# Patient Record
Sex: FEMALE | Race: WHITE | NOT HISPANIC OR LATINO | Employment: UNEMPLOYED | URBAN - METROPOLITAN AREA
[De-identification: names, ages, dates, MRNs, and addresses within clinical notes are randomized per-mention and may not be internally consistent; named-entity substitution may affect disease eponyms.]

---

## 2017-01-05 ENCOUNTER — OFFICE VISIT (OUTPATIENT)
Dept: FAMILY MEDICINE | Facility: CLINIC | Age: 52
End: 2017-01-05
Payer: COMMERCIAL

## 2017-01-05 VITALS
HEART RATE: 88 BPM | DIASTOLIC BLOOD PRESSURE: 72 MMHG | SYSTOLIC BLOOD PRESSURE: 118 MMHG | HEIGHT: 65 IN | BODY MASS INDEX: 36.33 KG/M2 | WEIGHT: 218.06 LBS

## 2017-01-05 DIAGNOSIS — D64.9 ANEMIA, UNSPECIFIED TYPE: ICD-10-CM

## 2017-01-05 DIAGNOSIS — I10 ESSENTIAL HYPERTENSION: ICD-10-CM

## 2017-01-05 DIAGNOSIS — E11.9 TYPE 2 DIABETES MELLITUS WITHOUT COMPLICATION, WITHOUT LONG-TERM CURRENT USE OF INSULIN: ICD-10-CM

## 2017-01-05 DIAGNOSIS — E03.9 HYPOTHYROIDISM, UNSPECIFIED TYPE: ICD-10-CM

## 2017-01-05 DIAGNOSIS — N39.0 URINARY TRACT INFECTION WITHOUT HEMATURIA, SITE UNSPECIFIED: ICD-10-CM

## 2017-01-05 DIAGNOSIS — R30.0 DYSURIA: Primary | ICD-10-CM

## 2017-01-05 PROCEDURE — 4010F ACE/ARB THERAPY RXD/TAKEN: CPT | Mod: S$GLB,,, | Performed by: NURSE PRACTITIONER

## 2017-01-05 PROCEDURE — 2022F DILAT RTA XM EVC RTNOPTHY: CPT | Mod: S$GLB,,, | Performed by: NURSE PRACTITIONER

## 2017-01-05 PROCEDURE — 99999 PR PBB SHADOW E&M-EST. PATIENT-LVL III: CPT | Mod: PBBFAC,,, | Performed by: NURSE PRACTITIONER

## 2017-01-05 PROCEDURE — 1159F MED LIST DOCD IN RCRD: CPT | Mod: S$GLB,,, | Performed by: NURSE PRACTITIONER

## 2017-01-05 PROCEDURE — 87147 CULTURE TYPE IMMUNOLOGIC: CPT

## 2017-01-05 PROCEDURE — 3078F DIAST BP <80 MM HG: CPT | Mod: S$GLB,,, | Performed by: NURSE PRACTITIONER

## 2017-01-05 PROCEDURE — 81001 URINALYSIS AUTO W/SCOPE: CPT | Mod: S$GLB,,, | Performed by: NURSE PRACTITIONER

## 2017-01-05 PROCEDURE — 3046F HEMOGLOBIN A1C LEVEL >9.0%: CPT | Mod: S$GLB,,, | Performed by: NURSE PRACTITIONER

## 2017-01-05 PROCEDURE — 87086 URINE CULTURE/COLONY COUNT: CPT

## 2017-01-05 PROCEDURE — 87088 URINE BACTERIA CULTURE: CPT

## 2017-01-05 PROCEDURE — 3074F SYST BP LT 130 MM HG: CPT | Mod: S$GLB,,, | Performed by: NURSE PRACTITIONER

## 2017-01-05 PROCEDURE — 99214 OFFICE O/P EST MOD 30 MIN: CPT | Mod: 25,S$GLB,, | Performed by: NURSE PRACTITIONER

## 2017-01-05 RX ORDER — PHENAZOPYRIDINE HYDROCHLORIDE 200 MG/1
200 TABLET, FILM COATED ORAL 3 TIMES DAILY PRN
Qty: 10 TABLET | Refills: 0 | Status: SHIPPED | OUTPATIENT
Start: 2017-01-05 | End: 2017-12-27

## 2017-01-05 RX ORDER — LEVOTHYROXINE SODIUM 75 UG/1
75 TABLET ORAL DAILY
Qty: 30 TABLET | Refills: 5 | Status: SHIPPED | OUTPATIENT
Start: 2017-01-05 | End: 2017-12-27 | Stop reason: SDUPTHER

## 2017-01-05 RX ORDER — TELMISARTAN 20 MG/1
20 TABLET ORAL DAILY
Qty: 30 TABLET | Refills: 5 | Status: SHIPPED | OUTPATIENT
Start: 2017-01-05 | End: 2017-12-27 | Stop reason: SDUPTHER

## 2017-01-05 RX ORDER — SULFAMETHOXAZOLE AND TRIMETHOPRIM 800; 160 MG/1; MG/1
1 TABLET ORAL 2 TIMES DAILY
Qty: 20 TABLET | Refills: 0 | Status: SHIPPED | OUTPATIENT
Start: 2017-01-05 | End: 2017-01-15

## 2017-01-05 RX ORDER — GLIMEPIRIDE 4 MG/1
4 TABLET ORAL
Qty: 30 TABLET | Refills: 5 | Status: SHIPPED | OUTPATIENT
Start: 2017-01-05 | End: 2017-12-27 | Stop reason: SDUPTHER

## 2017-01-05 RX ORDER — FERROUS SULFATE 325(65) MG
325 TABLET ORAL 2 TIMES DAILY
Qty: 60 TABLET | Refills: 5 | Status: SHIPPED | OUTPATIENT
Start: 2017-01-05 | End: 2017-12-27 | Stop reason: SDUPTHER

## 2017-01-05 NOTE — PROGRESS NOTES
Subjective:       Patient ID: Ladonna Hunter is a 51 y.o. female.    Chief Complaint: Follow-up    HPI Comments: Here for 6 month follow up. Doing well but burning on urination for 2 days.    Review of Systems   Constitutional: Negative.  Negative for appetite change, fatigue and fever.   HENT: Negative.  Negative for congestion, ear pain and sore throat.    Eyes: Negative.  Negative for visual disturbance.   Respiratory: Negative.  Negative for cough, shortness of breath and wheezing.    Cardiovascular: Negative.    Gastrointestinal: Negative.  Negative for abdominal pain, diarrhea, nausea and vomiting.   Endocrine: Negative.    Genitourinary: Positive for dysuria (for 2 days). Negative for difficulty urinating and urgency.   Musculoskeletal: Negative.  Negative for arthralgias and myalgias.   Skin: Negative.  Negative for color change.   Allergic/Immunologic: Negative.    Neurological: Negative.  Negative for dizziness and headaches.   Hematological: Negative.    Psychiatric/Behavioral: Negative.  Negative for sleep disturbance.   All other systems reviewed and are negative.      Objective:      Physical Exam   Constitutional: She is oriented to person, place, and time. She appears well-developed and well-nourished. No distress.   HENT:   Head: Normocephalic and atraumatic.   Eyes: Pupils are equal, round, and reactive to light.   Neck: Normal range of motion. Neck supple.   Cardiovascular: Normal rate and regular rhythm.    No murmur heard.  Pulmonary/Chest: Effort normal and breath sounds normal. No respiratory distress.   Musculoskeletal: Normal range of motion.   Neurological: She is alert and oriented to person, place, and time.   Skin: Skin is warm and dry.   Psychiatric: She has a normal mood and affect.   Nursing note and vitals reviewed.      Assessment:       1. Dysuria    2. Type 2 diabetes mellitus without complication, without long-term current use of insulin    3. Essential hypertension    4.  Hypothyroidism, unspecified type    5. Anemia, unspecified type    6. Urinary tract infection without hematuria, site unspecified        Plan:   Ladonna was seen today for follow-up.    Diagnoses and all orders for this visit:    Dysuria  -     POCT urinalysis, dipstick or tablet reag  -     POCT URINE SEDIMENT EXAM    Type 2 diabetes mellitus without complication, without long-term current use of insulin  -     telmisartan (MICARDIS) 20 MG Tab; Take 1 tablet (20 mg total) by mouth once daily.  -     glimepiride (AMARYL) 4 MG tablet; Take 1 tablet (4 mg total) by mouth before breakfast.    Essential hypertension  -     telmisartan (MICARDIS) 20 MG Tab; Take 1 tablet (20 mg total) by mouth once daily.    Hypothyroidism, unspecified type  -     levothyroxine (SYNTHROID) 75 MCG tablet; Take 1 tablet (75 mcg total) by mouth once daily.    Anemia, unspecified type  -     ferrous sulfate 325 mg (65 mg iron) Tab tablet; Take 1 tablet (325 mg total) by mouth 2 (two) times daily.    Urinary tract infection without hematuria, site unspecified  -     sulfamethoxazole-trimethoprim 800-160mg (BACTRIM DS) 800-160 mg Tab; Take 1 tablet by mouth 2 (two) times daily.  -     phenazopyridine (PYRIDIUM) 200 MG tablet; Take 1 tablet (200 mg total) by mouth 3 (three) times daily as needed for Pain (dysuria).  -     Urine culture    Blood work fasting Monday    RTC 6 months for recheck

## 2017-01-05 NOTE — MR AVS SNAPSHOT
[place] [address] [address] [facility] [address]  Phone: [phone]  Fax: [phone]                  Ladonna Hunter   2017 4:15 PM   Office Visit    Description:  Female : 1965   Provider:  Mayra Islas NP   Department:  [facility]           Reason for Visit     Follow-up           Diagnoses this Visit        Comments    Dysuria    -  Primary     Type 2 diabetes mellitus without complication, without long-term current use of insulin         Essential hypertension         Hypothyroidism, unspecified type         Anemia, unspecified type         Urinary tract infection without hematuria, site unspecified                To Do List           Future Appointments        Provider Department Dept Phone    2017 10:25 AM LAB, [facility] [phone]    2017 1:15 PM Mayra Islas NP [facility] [phone]      Goals (5 Years of Data)     None      Follow-Up and Disposition     Return in about 6 months (around 2017).       These Medications        Disp Refills Start End    telmisartan (MICARDIS) 20 MG Tab 30 tablet 5 2017    Take 1 tablet (20 mg total) by mouth once daily. - Oral    Pharmacy: [place] [id number] Ph #: [phone]       ferrous sulfate 325 mg (65 mg iron) Tab tablet 60 tablet 5 2017     Take 1 tablet (325 mg total) by mouth 2 (two) times daily. - Oral    Pharmacy: [place] [address] Ph #: [phone]       levothyroxine (SYNTHROID) 75 MCG tablet 30 tablet 5 2017    Take 1 tablet (75 mcg total) by mouth once daily. - Oral    Pharmacy: [place] [address] Ph #: [phone]       glimepiride (AMARYL) 4 MG tablet 30 tablet 5 2017    Take 1 tablet (4 mg total) by mouth before breakfast. - Oral    Pharmacy: [address]  [facility] [place] [address] Ph #: [phone]       sulfamethoxazole-trimethoprim 800-160mg (BACTRIM DS) 800-160 mg Tab 20 tablet 0 1/5/2017 1/15/2017    Take 1 tablet by mouth 2 (two) times daily. - Oral    Pharmacy: [facility] [address] [place] [address] Ph #: [phone]       phenazopyridine (PYRIDIUM) 200 MG tablet 10 tablet 0 1/5/2017     Take 1 tablet (200 mg total) by mouth 3 (three) times daily as needed for Pain (dysuria). - Oral    Pharmacy: [place] [address] Ph #: [phone]         [facility]     [facility] On Call Nurse [facility] Line - 24/7 Assistance  Registered nurses in the [facility] On Call Center provide clinical advisement, health education, appointment booking, and other advisory services.  Call for this free service at [phone].             Medications           Message regarding Medications     Verify the changes and/or additions to your medication regime listed below are the same as discussed with your clinician today.  If any of these changes or additions are incorrect, please notify your healthcare provider.        START taking these NEW medications        Refills    sulfamethoxazole-trimethoprim 800-160mg (BACTRIM DS) 800-160 mg Tab 0    Sig: Take 1 tablet by mouth 2 (two) times daily.    Class: Normal    Route: Oral    phenazopyridine (PYRIDIUM) 200 MG tablet 0    Sig: Take 1 tablet (200 mg total) by mouth 3 (three) times daily as needed for Pain (dysuria).    Class: Normal    Route: Oral      STOP taking these medications     benzphetamine 50 mg Tab Take 1 tablet by mouth 3 (three) times daily.           Verify that the below list of medications is an accurate representation of the medications you are currently taking.  If none reported, the list may be blank. If incorrect, please contact your healthcare provider. Carry this list with you in case of emergency.           Current Medications     blood sugar diagnostic Strp 1 strip by  "Misc.(Non-Drug; Combo Route) route 3 (three) times daily. Use with True Result Glucometer    ferrous sulfate 325 mg (65 mg iron) Tab tablet Take 1 tablet (325 mg total) by mouth 2 (two) times daily.    glimepiride (AMARYL) 4 MG tablet Take 1 tablet (4 mg total) by mouth before breakfast.    lancets Misc 1 Units by Misc.(Non-Drug; Combo Route) route 4 (four) times daily as needed.    levothyroxine (SYNTHROID) 75 MCG tablet Take 1 tablet (75 mcg total) by mouth once daily.    metformin (GLUCOPHAGE) 500 MG tablet Take 2 tablets (1,000 mg total) by mouth 2 (two) times daily with meals.    telmisartan (MICARDIS) 20 MG Tab Take 1 tablet (20 mg total) by mouth once daily.    blood-glucose meter kit Use as instructed    fluconazole (DIFLUCAN) 150 MG Tab 1 today and repeat in 1 week if necessary    phenazopyridine (PYRIDIUM) 200 MG tablet Take 1 tablet (200 mg total) by mouth 3 (three) times daily as needed for Pain (dysuria).    sulfamethoxazole-trimethoprim 800-160mg (BACTRIM DS) 800-160 mg Tab Take 1 tablet by mouth 2 (two) times daily.           Clinical Reference Information           Vital Signs - Last Recorded  Most recent update: 1/5/2017  4:27 PM by Tejas Davila MA    BP Pulse Ht Wt BMI    118/72 (BP Location: Left arm, Patient Position: Sitting, BP Method: Manual) 88 5' 5" (1.651 m) 98.9 kg (218 lb 0.6 oz) 36.28 kg/m2      Blood Pressure          Most Recent Value    BP  118/72      Allergies as of 1/5/2017     No Known Allergies      Immunizations Administered on Date of Encounter - 1/5/2017     None      Orders Placed During Today's Visit      Normal Orders This Visit    POCT urinalysis, dipstick or tablet reag     POCT URINE SEDIMENT EXAM     Urine culture       "

## 2017-01-06 LAB
BACTERIA SPEC CULT: NORMAL
BILIRUB SERPL-MCNC: NORMAL MG/DL
BLOOD URINE, POC: NORMAL
CASTS: NORMAL
COLOR, POC UA: NORMAL
CRYSTALS: NORMAL
GLUCOSE UR QL STRIP: 1000
KETONES UR QL STRIP: NORMAL
LEUKOCYTE ESTERASE URINE, POC: NORMAL
NITRITE, POC UA: NORMAL
PH, POC UA: 5
PROTEIN, POC: 30
RBC CELLS COUNTED: NORMAL
SPECIFIC GRAVITY, POC UA: 1.02
UROBILINOGEN, POC UA: NORMAL
WHITE BLOOD CELLS: NORMAL

## 2017-01-07 LAB — BACTERIA UR CULT: NORMAL

## 2017-01-09 ENCOUNTER — CLINICAL SUPPORT (OUTPATIENT)
Dept: FAMILY MEDICINE | Facility: CLINIC | Age: 52
End: 2017-01-09
Payer: COMMERCIAL

## 2017-01-09 DIAGNOSIS — D64.9 ANEMIA: ICD-10-CM

## 2017-01-09 DIAGNOSIS — E03.9 HYPOTHYROIDISM: ICD-10-CM

## 2017-01-09 LAB
ANISOCYTOSIS BLD QL SMEAR: SLIGHT
BASOPHILS # BLD AUTO: 0.03 K/UL
BASOPHILS NFR BLD: 0.4 %
DIFFERENTIAL METHOD: ABNORMAL
EOSINOPHIL # BLD AUTO: 0.2 K/UL
EOSINOPHIL NFR BLD: 2 %
ERYTHROCYTE [DISTWIDTH] IN BLOOD BY AUTOMATED COUNT: 19 %
GIANT PLATELETS BLD QL SMEAR: PRESENT
HCT VFR BLD AUTO: 34.7 %
HGB BLD-MCNC: 10.2 G/DL
LYMPHOCYTES # BLD AUTO: 2.4 K/UL
LYMPHOCYTES NFR BLD: 32 %
MCH RBC QN AUTO: 17.9 PG
MCHC RBC AUTO-ENTMCNC: 29.4 %
MCV RBC AUTO: 61 FL
MONOCYTES # BLD AUTO: 0.5 K/UL
MONOCYTES NFR BLD: 7.3 %
NEUTROPHILS # BLD AUTO: 4.3 K/UL
NEUTROPHILS NFR BLD: 58.4 %
PLATELET # BLD AUTO: 301 K/UL
PMV BLD AUTO: ABNORMAL FL
POIKILOCYTOSIS BLD QL SMEAR: SLIGHT
POLYCHROMASIA BLD QL SMEAR: ABNORMAL
RBC # BLD AUTO: 5.7 M/UL
SCHISTOCYTES BLD QL SMEAR: PRESENT
T4 FREE SERPL-MCNC: 0.94 NG/DL
TSH SERPL DL<=0.005 MIU/L-ACNC: 4.96 UIU/ML
WBC # BLD AUTO: 7.43 K/UL

## 2017-01-09 PROCEDURE — 36415 COLL VENOUS BLD VENIPUNCTURE: CPT | Mod: S$GLB,,, | Performed by: NURSE PRACTITIONER

## 2017-01-09 PROCEDURE — 84439 ASSAY OF FREE THYROXINE: CPT

## 2017-01-09 PROCEDURE — 84443 ASSAY THYROID STIM HORMONE: CPT

## 2017-01-09 PROCEDURE — 85025 COMPLETE CBC W/AUTO DIFF WBC: CPT

## 2017-01-09 PROCEDURE — 83036 HEMOGLOBIN GLYCOSYLATED A1C: CPT

## 2017-01-11 LAB
ESTIMATED AVG GLUCOSE: 326 MG/DL
HBA1C MFR BLD HPLC: 13 %

## 2017-01-12 ENCOUNTER — TELEPHONE (OUTPATIENT)
Dept: FAMILY MEDICINE | Facility: CLINIC | Age: 52
End: 2017-01-12

## 2017-01-12 NOTE — PROGRESS NOTES
Abnormal lab - A1c still too high. i would like to send her to endocrine. Does she want to see Dr. Lloyd, [facility] or [facility]?

## 2017-01-12 NOTE — TELEPHONE ENCOUNTER
----- Message from Mayra Islas NP sent at 1/12/2017  8:21 AM CST -----  Abnormal lab - A1c still too high. i would like to send her to endocrine. Does she want to see Dr. Lloyd, [facility] or [facility] [place]?

## 2017-01-13 NOTE — TELEPHONE ENCOUNTER
----- Message from Doreen Petersen sent at 2017 10:40 AM CST -----  Contact: SELF  Ladonna Hunter  MRN: [id number]  : 1965  PCP: Mayra Islas  Home Phone      [phone]  Work Phone      [phone]  Mobile          [phone]      MESSAGE:    Returning call to clinic.    Phone:  [phone]

## 2017-05-29 DIAGNOSIS — E11.9 TYPE 2 DIABETES MELLITUS WITHOUT COMPLICATION: ICD-10-CM

## 2017-06-05 LAB — HUMAN PAPILLOMAVIRUS (HPV): NORMAL

## 2017-06-20 ENCOUNTER — PATIENT OUTREACH (OUTPATIENT)
Dept: ADMINISTRATIVE | Facility: HOSPITAL | Age: 52
End: 2017-06-20

## 2017-06-20 NOTE — LETTER
June 30, 2017    Ladonna Hunter  [address] [place] [address]             [facility]  [address]  [place] [address]  Phone: [phone] Dear Mrs. Hunter:    We have tried to reach you by mychart unsuccessfully.    [facility] is committed to your overall health.  To help you get the most out of each of your visits, we will review your information to make sure you are up to date on all of your recommended tests and/or procedures.       Mayra Islas NP has found that you may be due for a tetanus vaccine, a pneumonia vaccine, an annual diabetic foot exan, an annual diabetic eye exam, a Pap smear with HPV Cotest, a mammogram, a colonoscopy, a fasting cholesterol check and a Hemoglobin A1C diabetic blood test.     If you have had any of the above done at another facility, please bring the records or information with you so that your record at [facility] will be complete.  If you would like to schedule any of these, please contact me.     If you are currently taking medication, please bring it with you to your appointment for review.       Sincerely,       Carissa Jeong LPN Clinical Care Coordinator   [facility]   [phone]

## 2017-07-07 DIAGNOSIS — E11.9 TYPE 2 DIABETES MELLITUS WITHOUT COMPLICATION: ICD-10-CM

## 2017-07-28 DIAGNOSIS — Z12.31 OTHER SCREENING MAMMOGRAM: ICD-10-CM

## 2017-09-18 DIAGNOSIS — E11.9 DM (DIABETES MELLITUS): ICD-10-CM

## 2017-09-18 DIAGNOSIS — E11.9 TYPE 2 DIABETES MELLITUS WITHOUT COMPLICATION: ICD-10-CM

## 2017-09-18 RX ORDER — LANCETS
1 EACH MISCELLANEOUS 4 TIMES DAILY PRN
Qty: 1 EACH | Refills: 5 | Status: SHIPPED | OUTPATIENT
Start: 2017-09-18 | End: 2017-09-22 | Stop reason: SDUPTHER

## 2017-09-21 NOTE — TELEPHONE ENCOUNTER
Called pt to let her know that Rx was sent ,However pt states that she needs a new machine she has none now. Please call pt.

## 2017-09-22 RX ORDER — LANCETS
1 EACH MISCELLANEOUS 4 TIMES DAILY PRN
Qty: 1 EACH | Refills: 5 | Status: SHIPPED | OUTPATIENT
Start: 2017-09-22 | End: 2017-09-26 | Stop reason: SDUPTHER

## 2017-09-22 NOTE — TELEPHONE ENCOUNTER
----- Message from Doreen Petersen MA sent at 2017  2:01 PM CDT -----  Contact: guerita Hunter  MRN: [id number]  : 1965  PCP: Mayra Denny Foret  Home Phone      [phone]  Work Phone      [phone]  Mobile          [phone]      MESSAGE:   Needs refill on lancets,test strips and machine.  Stated Mrs Denny was looking into getting her a new machine, due to the one she has the no longer make.  Phone:  [phone]  Pharmacy:  [facility]

## 2017-09-26 ENCOUNTER — TELEPHONE (OUTPATIENT)
Dept: FAMILY MEDICINE | Facility: CLINIC | Age: 52
End: 2017-09-26

## 2017-09-26 NOTE — TELEPHONE ENCOUNTER
Notified by pts insurance that she has to go through their diabetic program and use [facility] in order for them to pay for the diabetic supply

## 2017-09-26 NOTE — TELEPHONE ENCOUNTER
Attempted to initiate PA on pts test strips, and was notified by pts insurance that she has to register for the Diabetic Program offered by her insurance. The number to register is [phone]. Once registered all rxs for diabetic supply must go to [facility] in [facility] ph: [phone]; fax [phone]      She must register for them to pay.      No answer. LM

## 2017-09-27 RX ORDER — LANCETS
1 EACH MISCELLANEOUS 4 TIMES DAILY PRN
Qty: 1 EACH | Refills: 5 | Status: SHIPPED | OUTPATIENT
Start: 2017-09-27 | End: 2019-06-12

## 2017-12-26 ENCOUNTER — TELEPHONE (OUTPATIENT)
Dept: FAMILY MEDICINE | Facility: CLINIC | Age: 52
End: 2017-12-26

## 2017-12-26 NOTE — TELEPHONE ENCOUNTER
----- Message from Mario Valadez sent at 2017  9:11 AM CST -----  Contact: SELF   Ladonna Hunter  MRN: [id number]  : 1965  PCP: Mayra Islas  Home Phone      [phone]  Work Phone      [phone]  Mobile          [phone]      MESSAGE: STATED SHE IS HAVING SYMPTOMS OF BELLS PALSY AND WANTS TO BE SEEN BY ANYONE TODAY. SHE IS SCHEDULED TO SEE MS. BRIAN TOMORROW BUT IS VERY CONCERNED. PLEASE CALL     PHONE: [phone]

## 2017-12-27 ENCOUNTER — OFFICE VISIT (OUTPATIENT)
Dept: FAMILY MEDICINE | Facility: CLINIC | Age: 52
End: 2017-12-27
Payer: COMMERCIAL

## 2017-12-27 VITALS
RESPIRATION RATE: 18 BRPM | WEIGHT: 221.56 LBS | HEIGHT: 65 IN | OXYGEN SATURATION: 96 % | TEMPERATURE: 99 F | DIASTOLIC BLOOD PRESSURE: 72 MMHG | BODY MASS INDEX: 36.91 KG/M2 | SYSTOLIC BLOOD PRESSURE: 142 MMHG | HEART RATE: 71 BPM

## 2017-12-27 DIAGNOSIS — G51.0 BELL'S PALSY: ICD-10-CM

## 2017-12-27 DIAGNOSIS — E11.9 TYPE 2 DIABETES MELLITUS WITHOUT COMPLICATION, WITHOUT LONG-TERM CURRENT USE OF INSULIN: ICD-10-CM

## 2017-12-27 DIAGNOSIS — E03.9 HYPOTHYROIDISM, UNSPECIFIED TYPE: ICD-10-CM

## 2017-12-27 DIAGNOSIS — D64.9 ANEMIA, UNSPECIFIED TYPE: ICD-10-CM

## 2017-12-27 DIAGNOSIS — I10 ESSENTIAL HYPERTENSION: ICD-10-CM

## 2017-12-27 DIAGNOSIS — T25.232A PARTIAL THICKNESS BURN OF TOE OF LEFT FOOT, INITIAL ENCOUNTER: Primary | ICD-10-CM

## 2017-12-27 PROCEDURE — 99214 OFFICE O/P EST MOD 30 MIN: CPT | Mod: 25,S$GLB,, | Performed by: NURSE PRACTITIONER

## 2017-12-27 PROCEDURE — 99999 PR PBB SHADOW E&M-EST. PATIENT-LVL III: CPT | Mod: PBBFAC,,, | Performed by: NURSE PRACTITIONER

## 2017-12-27 RX ORDER — GLIMEPIRIDE 4 MG/1
4 TABLET ORAL
Qty: 30 TABLET | Refills: 5 | Status: SHIPPED | OUTPATIENT
Start: 2017-12-27 | End: 2018-01-03 | Stop reason: SDUPTHER

## 2017-12-27 RX ORDER — VALACYCLOVIR HYDROCHLORIDE 1 G/1
1000 TABLET, FILM COATED ORAL 2 TIMES DAILY
Qty: 14 TABLET | Refills: 0 | Status: SHIPPED | OUTPATIENT
Start: 2017-12-27 | End: 2019-06-12

## 2017-12-27 RX ORDER — METFORMIN HYDROCHLORIDE 500 MG/1
1000 TABLET ORAL 2 TIMES DAILY WITH MEALS
Qty: 120 TABLET | Refills: 5 | Status: SHIPPED | OUTPATIENT
Start: 2017-12-27 | End: 2018-09-05 | Stop reason: SDUPTHER

## 2017-12-27 RX ORDER — SILVER SULFADIAZINE 10 G/1000G
CREAM TOPICAL 2 TIMES DAILY
Qty: 20 G | Refills: 0 | Status: SHIPPED | OUTPATIENT
Start: 2017-12-27 | End: 2018-01-18 | Stop reason: SDUPTHER

## 2017-12-27 RX ORDER — TELMISARTAN 20 MG/1
20 TABLET ORAL DAILY
Qty: 30 TABLET | Refills: 5 | Status: SHIPPED | OUTPATIENT
Start: 2017-12-27 | End: 2018-01-03 | Stop reason: SDUPTHER

## 2017-12-27 RX ORDER — FERROUS SULFATE 325(65) MG
325 TABLET ORAL 2 TIMES DAILY
Qty: 60 TABLET | Refills: 5 | Status: SHIPPED | OUTPATIENT
Start: 2017-12-27 | End: 2019-06-12

## 2017-12-27 RX ORDER — LEVOTHYROXINE SODIUM 75 UG/1
75 TABLET ORAL DAILY
Qty: 30 TABLET | Refills: 5 | Status: SHIPPED | OUTPATIENT
Start: 2017-12-27 | End: 2018-01-03 | Stop reason: SDUPTHER

## 2017-12-27 RX ORDER — GLUCOSAM/CHON-MSM1/C/MANG/BOSW 500-416.6
TABLET ORAL
Refills: 1 | COMMUNITY
Start: 2017-09-28 | End: 2019-06-12

## 2017-12-27 NOTE — PROGRESS NOTES
Subjective:       Patient ID: Ladonna Hunter is a 52 y.o. female.    Chief Complaint: Follow-up (check up) and Toe Injury (blisters on right foot)    Here for check up of DM and HTN. Started with Bell's palsy 3 days ago. Has had it before.      Toe Injury   This is a new problem. Episode onset: 2 weeks ago. Pertinent negatives include no abdominal pain, arthralgias, congestion, coughing, fatigue, fever, headaches, myalgias, nausea, sore throat or vomiting. Associated symptoms comments: Burn to the left 4-5 toes. Exacerbated by: DM.     Review of Systems   Constitutional: Negative.  Negative for appetite change, fatigue and fever.   HENT: Negative.  Negative for congestion, ear pain and sore throat.    Eyes: Negative.  Negative for visual disturbance.   Respiratory: Negative.  Negative for cough, shortness of breath and wheezing.    Cardiovascular: Negative.    Gastrointestinal: Negative.  Negative for abdominal pain, diarrhea, nausea and vomiting.   Endocrine: Negative.    Genitourinary: Negative.  Negative for difficulty urinating and urgency.   Musculoskeletal: Negative.  Negative for arthralgias and myalgias.   Skin: Positive for wound (burn to the 4-5th toes of the left foot - both at the pads). Negative for color change.   Allergic/Immunologic: Negative.    Neurological: Positive for facial asymmetry (paralysis on the right side). Negative for dizziness and headaches.   Hematological: Negative.    Psychiatric/Behavioral: Negative.  Negative for sleep disturbance.   All other systems reviewed and are negative.      Objective:      Physical Exam   Constitutional: She is oriented to person, place, and time. She appears well-developed and well-nourished.   HENT:   Head: Normocephalic and atraumatic.   Eyes: Conjunctivae and EOM are normal. Pupils are equal, round, and reactive to light.   Neck: Normal range of motion. Neck supple. No thyromegaly present.   Cardiovascular: Normal rate, regular rhythm and normal  heart sounds.    No murmur heard.  Pulmonary/Chest: Effort normal and breath sounds normal. No respiratory distress.   Abdominal: Soft.   Musculoskeletal: Normal range of motion.   Lymphadenopathy:     She has no cervical adenopathy.   Neurological: She is alert and oriented to person, place, and time. She has normal reflexes.   Facial droop on the right   Skin: Skin is warm and dry. No rash noted.   Burns at the 4-5th toes at the pad of each. Debridement done. There is some thick skin at the fifth toe.   Psychiatric: She has a normal mood and affect.   Nursing note and vitals reviewed.      Assessment:       1. Partial thickness burn of toe of left foot, initial encounter    2. Bell's palsy    3. Anemia, unspecified type    4. Type 2 diabetes mellitus without complication, without long-term current use of insulin    5. Essential hypertension    6. Hypothyroidism, unspecified type        Plan:   Ladonna was seen today for follow-up and toe injury.    Diagnoses and all orders for this visit:    Partial thickness burn of toe of left foot, initial encounter  -     silver sulfADIAZINE 1% (SILVADENE) 1 % cream; Apply topically 2 (two) times daily.    Bell's palsy  -     valACYclovir (VALTREX) 1000 MG tablet; Take 1 tablet (1,000 mg total) by mouth 2 (two) times daily.    Anemia, unspecified type  -     ferrous sulfate 325 mg (65 mg iron) Tab tablet; Take 1 tablet (325 mg total) by mouth 2 (two) times daily.  -     CBC auto differential; Future    Type 2 diabetes mellitus without complication, without long-term current use of insulin  -     telmisartan (MICARDIS) 20 MG Tab; Take 1 tablet (20 mg total) by mouth once daily.  -     metFORMIN (GLUCOPHAGE) 500 MG tablet; Take 2 tablets (1,000 mg total) by mouth 2 (two) times daily with meals.  -     glimepiride (AMARYL) 4 MG tablet; Take 1 tablet (4 mg total) by mouth before breakfast.  -     CBC auto differential; Future  -     Comprehensive metabolic panel; Future  -      Lipid panel; Future  -     POCT urinalysis, dipstick or tablet reag; Future  -     Hemoglobin A1c; Future    Essential hypertension  -     telmisartan (MICARDIS) 20 MG Tab; Take 1 tablet (20 mg total) by mouth once daily.  -     Comprehensive metabolic panel; Future  -     Lipid panel; Future  -     Microalbumin/creatinine urine ratio; Future  -     POCT urinalysis, dipstick or tablet reag; Future    Hypothyroidism, unspecified type  -     levothyroxine (SYNTHROID) 75 MCG tablet; Take 1 tablet (75 mcg total) by mouth once daily.  -     TSH; Future  -     T4, free; Future  -     T3, free; Future    RTC 1 week for recheck of toe and Bell Palsy

## 2017-12-29 ENCOUNTER — LAB VISIT (OUTPATIENT)
Dept: LAB | Facility: HOSPITAL | Age: 52
End: 2017-12-29
Attending: NURSE PRACTITIONER
Payer: COMMERCIAL

## 2017-12-29 DIAGNOSIS — E03.9 HYPOTHYROIDISM, UNSPECIFIED TYPE: ICD-10-CM

## 2017-12-29 DIAGNOSIS — I10 ESSENTIAL HYPERTENSION: ICD-10-CM

## 2017-12-29 DIAGNOSIS — E11.9 TYPE 2 DIABETES MELLITUS WITHOUT COMPLICATION, WITHOUT LONG-TERM CURRENT USE OF INSULIN: ICD-10-CM

## 2017-12-29 DIAGNOSIS — D64.9 ANEMIA, UNSPECIFIED TYPE: ICD-10-CM

## 2017-12-29 LAB
ALBUMIN SERPL BCP-MCNC: 3 G/DL
ALP SERPL-CCNC: 85 U/L
ALT SERPL W/O P-5'-P-CCNC: 9 U/L
ANION GAP SERPL CALC-SCNC: 9 MMOL/L
ANISOCYTOSIS BLD QL SMEAR: SLIGHT
AST SERPL-CCNC: 10 U/L
BASOPHILS # BLD AUTO: 0.02 K/UL
BASOPHILS NFR BLD: 0.3 %
BILIRUB SERPL-MCNC: 0.3 MG/DL
BUN SERPL-MCNC: 14 MG/DL
CALCIUM SERPL-MCNC: 9.4 MG/DL
CHLORIDE SERPL-SCNC: 103 MMOL/L
CHOLEST SERPL-MCNC: 210 MG/DL
CHOLEST/HDLC SERPL: 4 {RATIO}
CO2 SERPL-SCNC: 25 MMOL/L
CREAT SERPL-MCNC: 0.9 MG/DL
CREAT UR-MCNC: 44 MG/DL
DIFFERENTIAL METHOD: ABNORMAL
EOSINOPHIL # BLD AUTO: 0.1 K/UL
EOSINOPHIL NFR BLD: 2 %
ERYTHROCYTE [DISTWIDTH] IN BLOOD BY AUTOMATED COUNT: 18.7 %
EST. GFR  (AFRICAN AMERICAN): >60 ML/MIN/1.73 M^2
EST. GFR  (NON AFRICAN AMERICAN): >60 ML/MIN/1.73 M^2
ESTIMATED AVG GLUCOSE: 292 MG/DL
GLUCOSE SERPL-MCNC: 424 MG/DL
HBA1C MFR BLD HPLC: 11.8 %
HCT VFR BLD AUTO: 33.8 %
HDLC SERPL-MCNC: 53 MG/DL
HDLC SERPL: 25.2 %
HGB BLD-MCNC: 10.1 G/DL
HYPOCHROMIA BLD QL SMEAR: ABNORMAL
LDLC SERPL CALC-MCNC: 121 MG/DL
LYMPHOCYTES # BLD AUTO: 2.1 K/UL
LYMPHOCYTES NFR BLD: 28.7 %
MCH RBC QN AUTO: 19.5 PG
MCHC RBC AUTO-ENTMCNC: 29.9 G/DL
MCV RBC AUTO: 65 FL
MICROALBUMIN UR DL<=1MG/L-MCNC: 458 UG/ML
MICROALBUMIN/CREATININE RATIO: 1040.9 UG/MG
MONOCYTES # BLD AUTO: 0.5 K/UL
MONOCYTES NFR BLD: 7.3 %
NEUTROPHILS # BLD AUTO: 4.4 K/UL
NEUTROPHILS NFR BLD: 61.8 %
NONHDLC SERPL-MCNC: 157 MG/DL
OVALOCYTES BLD QL SMEAR: ABNORMAL
PLATELET # BLD AUTO: 266 K/UL
PLATELET BLD QL SMEAR: ABNORMAL
PMV BLD AUTO: 9.6 FL
POIKILOCYTOSIS BLD QL SMEAR: SLIGHT
POLYCHROMASIA BLD QL SMEAR: ABNORMAL
POTASSIUM SERPL-SCNC: 4.1 MMOL/L
PROT SERPL-MCNC: 7.7 G/DL
RBC # BLD AUTO: 5.19 M/UL
SODIUM SERPL-SCNC: 137 MMOL/L
T3FREE SERPL-MCNC: 2.4 PG/ML
T4 FREE SERPL-MCNC: 0.98 NG/DL
TRIGL SERPL-MCNC: 180 MG/DL
TSH SERPL DL<=0.005 MIU/L-ACNC: 6.45 UIU/ML
WBC # BLD AUTO: 7.17 K/UL

## 2017-12-29 PROCEDURE — 83036 HEMOGLOBIN GLYCOSYLATED A1C: CPT

## 2017-12-29 PROCEDURE — 80061 LIPID PANEL: CPT

## 2017-12-29 PROCEDURE — 80053 COMPREHEN METABOLIC PANEL: CPT

## 2017-12-29 PROCEDURE — 85025 COMPLETE CBC W/AUTO DIFF WBC: CPT

## 2017-12-29 PROCEDURE — 84481 FREE ASSAY (FT-3): CPT

## 2017-12-29 PROCEDURE — 84439 ASSAY OF FREE THYROXINE: CPT

## 2017-12-29 PROCEDURE — 36415 COLL VENOUS BLD VENIPUNCTURE: CPT

## 2017-12-29 PROCEDURE — 84443 ASSAY THYROID STIM HORMONE: CPT

## 2017-12-29 PROCEDURE — 82043 UR ALBUMIN QUANTITATIVE: CPT

## 2018-01-03 ENCOUNTER — OFFICE VISIT (OUTPATIENT)
Dept: FAMILY MEDICINE | Facility: CLINIC | Age: 53
End: 2018-01-03
Payer: COMMERCIAL

## 2018-01-03 VITALS
DIASTOLIC BLOOD PRESSURE: 82 MMHG | HEART RATE: 84 BPM | SYSTOLIC BLOOD PRESSURE: 142 MMHG | BODY MASS INDEX: 36.73 KG/M2 | WEIGHT: 220.44 LBS | HEIGHT: 65 IN

## 2018-01-03 DIAGNOSIS — I10 ESSENTIAL HYPERTENSION: ICD-10-CM

## 2018-01-03 DIAGNOSIS — G51.0 BELL'S PALSY: Primary | ICD-10-CM

## 2018-01-03 DIAGNOSIS — R80.9 PROTEINURIA, UNSPECIFIED TYPE: ICD-10-CM

## 2018-01-03 DIAGNOSIS — E03.9 HYPOTHYROIDISM, UNSPECIFIED TYPE: ICD-10-CM

## 2018-01-03 DIAGNOSIS — E11.9 TYPE 2 DIABETES MELLITUS WITHOUT COMPLICATION, WITHOUT LONG-TERM CURRENT USE OF INSULIN: ICD-10-CM

## 2018-01-03 DIAGNOSIS — T25.232D PARTIAL THICKNESS BURN OF TOE OF LEFT FOOT, SUBSEQUENT ENCOUNTER: ICD-10-CM

## 2018-01-03 DIAGNOSIS — E78.1 HYPERTRIGLYCERIDEMIA: ICD-10-CM

## 2018-01-03 PROCEDURE — 99999 PR PBB SHADOW E&M-EST. PATIENT-LVL III: CPT | Mod: PBBFAC,,, | Performed by: NURSE PRACTITIONER

## 2018-01-03 PROCEDURE — 99214 OFFICE O/P EST MOD 30 MIN: CPT | Mod: 25,S$GLB,, | Performed by: NURSE PRACTITIONER

## 2018-01-03 RX ORDER — TELMISARTAN 40 MG/1
40 TABLET ORAL DAILY
Qty: 30 TABLET | Refills: 5 | Status: SHIPPED | OUTPATIENT
Start: 2018-01-03 | End: 2018-09-05 | Stop reason: SDUPTHER

## 2018-01-03 RX ORDER — GLIMEPIRIDE 4 MG/1
4 TABLET ORAL 2 TIMES DAILY
Qty: 60 TABLET | Refills: 5 | Status: SHIPPED | OUTPATIENT
Start: 2018-01-03 | End: 2018-09-05 | Stop reason: SDUPTHER

## 2018-01-03 RX ORDER — FENOFIBRATE 134 MG/1
134 CAPSULE ORAL
Qty: 30 CAPSULE | Refills: 5 | Status: SHIPPED | OUTPATIENT
Start: 2018-01-03 | End: 2018-09-05 | Stop reason: SDUPTHER

## 2018-01-03 RX ORDER — LEVOTHYROXINE SODIUM 100 UG/1
100 TABLET ORAL DAILY
Qty: 30 TABLET | Refills: 5 | Status: SHIPPED | OUTPATIENT
Start: 2018-01-03 | End: 2018-09-05 | Stop reason: SDUPTHER

## 2018-01-03 NOTE — PROGRESS NOTES
Subjective:       Patient ID: Ladonna Hunter is a 52 y.o. female.    Chief Complaint: [place] Palsy    Here for recheck of Bell's Palsy and diabetes. Increased protein in the urine and A1C 11.8. Bell's Palsy not much better.      Review of Systems   Constitutional: Negative.  Negative for appetite change, fatigue and fever.   HENT: Negative.  Negative for congestion, ear pain and sore throat.    Eyes: Negative.  Negative for visual disturbance.   Respiratory: Negative.  Negative for cough, shortness of breath and wheezing.    Cardiovascular: Negative.    Gastrointestinal: Negative.  Negative for abdominal pain, diarrhea, nausea and vomiting.   Endocrine: Negative.    Genitourinary: Negative.  Negative for difficulty urinating and urgency.   Musculoskeletal: Negative.  Negative for arthralgias and myalgias.   Skin: Negative.  Negative for color change.   Allergic/Immunologic: Negative.    Neurological: Positive for numbness (at feet and right side of face). Negative for dizziness and headaches.        Still with facial droop on the right   Hematological: Negative.    Psychiatric/Behavioral: Negative.  Negative for sleep disturbance.   All other systems reviewed and are negative.      Objective:      Physical Exam   Constitutional: She is oriented to person, place, and time. She appears well-developed and well-nourished. No distress.   HENT:   Head: Normocephalic and atraumatic.   Eyes: Pupils are equal, round, and reactive to light.   Neck: Normal range of motion. Neck supple.   Cardiovascular: Normal rate and regular rhythm.    No murmur heard.  Pulmonary/Chest: Effort normal and breath sounds normal. No respiratory distress.   Musculoskeletal: Normal range of motion.   Neurological: She is alert and oriented to person, place, and time.   Facial droop on the right. Some better   Skin: Skin is warm and dry.   Left toes with ulcers looking better   Psychiatric: She has a normal mood and affect.   Nursing note and  vitals reviewed.      Assessment:       1. Bell's palsy    2. Partial thickness burn of toe of left foot, subsequent encounter    3. Type 2 diabetes mellitus without complication, without long-term current use of insulin    4. Essential hypertension    5. Hypothyroidism, unspecified type    6. Hypertriglyceridemia    7. Proteinuria, unspecified type        Plan:   Ladonna was seen today for bells palsy.    Diagnoses and all orders for this visit:    Bell's palsy    Partial thickness burn of toe of left foot, subsequent encounter    Type 2 diabetes mellitus without complication, without long-term current use of insulin  -     Increase telmisartan (MICARDIS) 40 MG Tab; Take 1 tablet (40 mg total) by mouth once daily.  -     Increase glimepiride (AMARYL) 4 MG tablet; Take 1 tablet (4 mg total) by mouth 2 (two) times daily.  -     fenofibrate micronized (LOFIBRA) 134 MG Cap; Take 1 capsule (134 mg total) by mouth daily with breakfast.    Essential hypertension  -     Increase telmisartan (MICARDIS) 40 MG Tab; Take 1 tablet (40 mg total) by mouth once daily.    Hypothyroidism, unspecified type  -     Increase levothyroxine (SYNTHROID) 100 MCG tablet; Take 1 tablet (100 mcg total) by mouth once daily.    Hypertriglyceridemia  -     fenofibrate micronized (LOFIBRA) 134 MG Cap; Take 1 capsule (134 mg total) by mouth daily with breakfast.    Proteinuria, unspecified type  -     Increase telmisartan (MICARDIS) 40 MG Tab; Take 1 tablet (40 mg total) by mouth once daily.    RTC 2 weeks for recheck

## 2018-01-18 ENCOUNTER — OFFICE VISIT (OUTPATIENT)
Dept: FAMILY MEDICINE | Facility: CLINIC | Age: 53
End: 2018-01-18
Payer: COMMERCIAL

## 2018-01-18 VITALS
WEIGHT: 222.38 LBS | HEIGHT: 65 IN | DIASTOLIC BLOOD PRESSURE: 62 MMHG | SYSTOLIC BLOOD PRESSURE: 118 MMHG | BODY MASS INDEX: 37.05 KG/M2 | HEART RATE: 84 BPM

## 2018-01-18 DIAGNOSIS — E11.9 TYPE 2 DIABETES MELLITUS WITHOUT COMPLICATION, WITHOUT LONG-TERM CURRENT USE OF INSULIN: ICD-10-CM

## 2018-01-18 DIAGNOSIS — G51.0 BELL'S PALSY: Primary | ICD-10-CM

## 2018-01-18 DIAGNOSIS — T25.232D PARTIAL THICKNESS BURN OF TOE OF LEFT FOOT, SUBSEQUENT ENCOUNTER: ICD-10-CM

## 2018-01-18 PROCEDURE — 99999 PR PBB SHADOW E&M-EST. PATIENT-LVL III: CPT | Mod: PBBFAC,,, | Performed by: NURSE PRACTITIONER

## 2018-01-18 PROCEDURE — 99213 OFFICE O/P EST LOW 20 MIN: CPT | Mod: S$GLB,,, | Performed by: NURSE PRACTITIONER

## 2018-01-18 RX ORDER — SILVER SULFADIAZINE 10 G/1000G
CREAM TOPICAL 2 TIMES DAILY
Qty: 20 G | Refills: 0 | Status: SHIPPED | OUTPATIENT
Start: 2018-01-18 | End: 2019-06-12

## 2018-01-18 NOTE — PROGRESS NOTES
Subjective:       Patient ID: Ladonna Hunter is a 52 y.o. female.    Chief Complaint: No chief complaint on file.    Here for recheck of Bell's Palsy. Notes facial drooping is better. Notes crease now in right face so feels its improving.   REcent ^ A1C>11, not well controlled. Notes BS now  running low 200s- In am it seems to always be higher. After lunch runs 180s. No low blood sugars < 100.       Review of Systems   Constitutional: Negative.  Negative for appetite change, fatigue and fever.   HENT: Negative.  Negative for congestion, ear pain and sore throat.    Eyes: Negative.  Negative for visual disturbance.   Respiratory: Negative.  Negative for cough, shortness of breath and wheezing.    Cardiovascular: Negative.    Gastrointestinal: Negative.  Negative for abdominal pain, diarrhea, nausea and vomiting.   Endocrine: Negative.    Genitourinary: Negative.  Negative for difficulty urinating and urgency.   Musculoskeletal: Negative.  Negative for arthralgias and myalgias.   Skin: Negative.  Negative for color change.   Allergic/Immunologic: Negative.    Neurological: Positive for numbness (numbness in feet better  and right facial numbness also improving ). Negative for dizziness and headaches.        Still with facial droop on the right   Hematological: Negative.    Psychiatric/Behavioral: Negative.  Negative for sleep disturbance.   All other systems reviewed and are negative.      Objective:      Physical Exam   Constitutional: She is oriented to person, place, and time. She appears well-developed and well-nourished. No distress.   HENT:   Head: Normocephalic and atraumatic.   Eyes: Pupils are equal, round, and reactive to light.   Neck: Normal range of motion. Neck supple.   Cardiovascular: Normal rate and regular rhythm.    No murmur heard.  Pulmonary/Chest: Effort normal and breath sounds normal. No respiratory distress.   Musculoskeletal: Normal range of motion.   Neurological: She is alert and oriented  to person, place, and time.   Facial droop on the right; much better. Continues to improve.   Numbness in feet r/t ^ BS also better.    Skin: Skin is warm and dry.        Left toes with ulcers looking better   Psychiatric: She has a normal mood and affect.   Nursing note and vitals reviewed.      Assessment:       1. Bell's palsy    2. Type 2 diabetes mellitus without complication, without long-term current use of insulin    3. Partial thickness burn of toe of left foot, subsequent encounter        Plan:     Diagnoses and all orders for this visit:    Bell's palsy - improvmg    Type 2 diabetes mellitus without complication, without long-term current use of insulin - improving    Partial thickness burn of toe of left foot, subsequent encounter  -     silver sulfADIAZINE 1% (SILVADENE) 1 % cream; Apply topically 2 (two) times daily.    RTC 4 weeks for recheck of DM

## 2018-01-19 DIAGNOSIS — Z12.11 COLON CANCER SCREENING: ICD-10-CM

## 2018-02-09 DIAGNOSIS — Z13.5 DIABETIC RETINOPATHY SCREENING: ICD-10-CM

## 2018-04-20 DIAGNOSIS — E11.9 TYPE 2 DIABETES MELLITUS WITHOUT COMPLICATION: ICD-10-CM

## 2018-08-27 ENCOUNTER — APPOINTMENT (OUTPATIENT)
Dept: RADIOLOGY | Facility: CLINIC | Age: 53
End: 2018-08-27
Attending: NURSE PRACTITIONER
Payer: COMMERCIAL

## 2018-08-27 ENCOUNTER — HOSPITAL ENCOUNTER (EMERGENCY)
Facility: HOSPITAL | Age: 53
Discharge: HOME | End: 2018-08-28
Attending: EMERGENCY MEDICINE
Payer: COMMERCIAL

## 2018-08-27 ENCOUNTER — OFFICE VISIT (OUTPATIENT)
Dept: FAMILY MEDICINE | Facility: CLINIC | Age: 53
End: 2018-08-27
Payer: COMMERCIAL

## 2018-08-27 ENCOUNTER — TELEPHONE (OUTPATIENT)
Dept: HEPATOLOGY | Facility: HOSPITAL | Age: 53
End: 2018-08-27

## 2018-08-27 VITALS
HEIGHT: 65 IN | WEIGHT: 215.81 LBS | BODY MASS INDEX: 35.96 KG/M2 | SYSTOLIC BLOOD PRESSURE: 142 MMHG | DIASTOLIC BLOOD PRESSURE: 82 MMHG | HEART RATE: 68 BPM | OXYGEN SATURATION: 98 %

## 2018-08-27 VITALS
RESPIRATION RATE: 16 BRPM | HEART RATE: 77 BPM | BODY MASS INDEX: 35.78 KG/M2 | TEMPERATURE: 98 F | WEIGHT: 215 LBS | SYSTOLIC BLOOD PRESSURE: 151 MMHG | DIASTOLIC BLOOD PRESSURE: 78 MMHG

## 2018-08-27 DIAGNOSIS — R60.0 PEDAL EDEMA: ICD-10-CM

## 2018-08-27 DIAGNOSIS — R73.9 HYPERGLYCEMIA: ICD-10-CM

## 2018-08-27 DIAGNOSIS — R06.02 SOB (SHORTNESS OF BREATH) ON EXERTION: ICD-10-CM

## 2018-08-27 DIAGNOSIS — R80.9 PROTEINURIA, UNSPECIFIED TYPE: ICD-10-CM

## 2018-08-27 DIAGNOSIS — E11.9 TYPE 2 DIABETES MELLITUS WITHOUT COMPLICATION, WITHOUT LONG-TERM CURRENT USE OF INSULIN: Primary | ICD-10-CM

## 2018-08-27 DIAGNOSIS — E03.9 HYPOTHYROIDISM, UNSPECIFIED TYPE: ICD-10-CM

## 2018-08-27 DIAGNOSIS — I10 ESSENTIAL HYPERTENSION: ICD-10-CM

## 2018-08-27 DIAGNOSIS — E11.9 TYPE 2 DIABETES MELLITUS WITHOUT COMPLICATION: ICD-10-CM

## 2018-08-27 DIAGNOSIS — E11.9 TYPE 2 DIABETES MELLITUS WITHOUT COMPLICATION, WITHOUT LONG-TERM CURRENT USE OF INSULIN: ICD-10-CM

## 2018-08-27 DIAGNOSIS — E78.1 HYPERTRIGLYCERIDEMIA: ICD-10-CM

## 2018-08-27 DIAGNOSIS — R25.2 LEG CRAMPS: ICD-10-CM

## 2018-08-27 DIAGNOSIS — N30.00 ACUTE CYSTITIS WITHOUT HEMATURIA: Primary | ICD-10-CM

## 2018-08-27 LAB
ALBUMIN SERPL BCP-MCNC: 2.7 G/DL
ALBUMIN/CREAT UR: 1044.3 UG/MG
ALP SERPL-CCNC: 86 U/L
ALT SERPL W/O P-5'-P-CCNC: 10 U/L
ANION GAP SERPL CALC-SCNC: 13 MMOL/L
AST SERPL-CCNC: 12 U/L
B-HCG UR QL: NEGATIVE
B-OH-BUTYR BLD STRIP-SCNC: 0.3 MMOL/L
BACTERIA #/AREA URNS HPF: ABNORMAL /HPF
BASOPHILS # BLD AUTO: 0.02 K/UL
BASOPHILS NFR BLD: 0.2 %
BILIRUB SERPL-MCNC: 0.2 MG/DL
BILIRUB UR QL STRIP: NEGATIVE
BNP SERPL-MCNC: 45 PG/ML
BUN SERPL-MCNC: 19 MG/DL
CALCIUM SERPL-MCNC: 9.4 MG/DL
CHLORIDE SERPL-SCNC: 96 MMOL/L
CHOLEST SERPL-MCNC: 263 MG/DL
CHOLEST/HDLC SERPL: 5.6 {RATIO}
CLARITY UR: CLEAR
CO2 SERPL-SCNC: 23 MMOL/L
COLOR UR: YELLOW
CREAT SERPL-MCNC: 1.2 MG/DL
CREAT UR-MCNC: 27.1 MG/DL
DIFFERENTIAL METHOD: ABNORMAL
EOSINOPHIL # BLD AUTO: 0.2 K/UL
EOSINOPHIL NFR BLD: 2 %
ERYTHROCYTE [DISTWIDTH] IN BLOOD BY AUTOMATED COUNT: 17.7 %
EST. GFR  (AFRICAN AMERICAN): >60 ML/MIN/1.73 M^2
EST. GFR  (NON AFRICAN AMERICAN): 52 ML/MIN/1.73 M^2
GLUCOSE SERPL-MCNC: 647 MG/DL
GLUCOSE UR QL STRIP: ABNORMAL
HCT VFR BLD AUTO: 35.1 %
HDLC SERPL-MCNC: 47 MG/DL
HDLC SERPL: 17.9 %
HGB BLD-MCNC: 11.3 G/DL
HGB UR QL STRIP: ABNORMAL
HYALINE CASTS #/AREA URNS LPF: 0 /LPF
KETONES UR QL STRIP: NEGATIVE
LDLC SERPL CALC-MCNC: ABNORMAL MG/DL
LEUKOCYTE ESTERASE UR QL STRIP: ABNORMAL
LYMPHOCYTES # BLD AUTO: 3.1 K/UL
LYMPHOCYTES NFR BLD: 36.1 %
MAGNESIUM SERPL-MCNC: 2.1 MG/DL
MCH RBC QN AUTO: 22.3 PG
MCHC RBC AUTO-ENTMCNC: 32.2 G/DL
MCV RBC AUTO: 69 FL
MICROALBUMIN UR DL<=1MG/L-MCNC: 283 UG/ML
MICROSCOPIC COMMENT: ABNORMAL
MONOCYTES # BLD AUTO: 0.8 K/UL
MONOCYTES NFR BLD: 9.4 %
NEUTROPHILS # BLD AUTO: 4.5 K/UL
NEUTROPHILS NFR BLD: 52.5 %
NITRITE UR QL STRIP: NEGATIVE
NONHDLC SERPL-MCNC: 216 MG/DL
PH UR STRIP: 6 [PH] (ref 5–8)
PLATELET # BLD AUTO: 279 K/UL
PMV BLD AUTO: 9.8 FL
POCT GLUCOSE: 432 MG/DL (ref 70–110)
POCT GLUCOSE: 465 MG/DL (ref 70–110)
POTASSIUM SERPL-SCNC: 4.3 MMOL/L
PROT SERPL-MCNC: 7.6 G/DL
PROT UR QL STRIP: ABNORMAL
RBC # BLD AUTO: 5.06 M/UL
RBC #/AREA URNS HPF: 5 /HPF (ref 0–4)
SODIUM SERPL-SCNC: 132 MMOL/L
SP GR UR STRIP: 1.01 (ref 1–1.03)
T3FREE SERPL-MCNC: 1.4 PG/ML
T4 FREE SERPL-MCNC: 0.91 NG/DL
TRIGL SERPL-MCNC: 548 MG/DL
TSH SERPL DL<=0.005 MIU/L-ACNC: 0.91 UIU/ML
URN SPEC COLLECT METH UR: ABNORMAL
UROBILINOGEN UR STRIP-ACNC: NEGATIVE EU/DL
WBC # BLD AUTO: 8.68 K/UL
WBC #/AREA URNS HPF: >100 /HPF (ref 0–5)
YEAST URNS QL MICRO: ABNORMAL

## 2018-08-27 PROCEDURE — 3079F DIAST BP 80-89 MM HG: CPT | Mod: CPTII,S$GLB,, | Performed by: NURSE PRACTITIONER

## 2018-08-27 PROCEDURE — 3008F BODY MASS INDEX DOCD: CPT | Mod: CPTII,S$GLB,, | Performed by: NURSE PRACTITIONER

## 2018-08-27 PROCEDURE — 82010 KETONE BODYS QUAN: CPT

## 2018-08-27 PROCEDURE — 80053 COMPREHEN METABOLIC PANEL: CPT

## 2018-08-27 PROCEDURE — 71045 X-RAY EXAM CHEST 1 VIEW: CPT | Mod: TC,PO

## 2018-08-27 PROCEDURE — 82043 UR ALBUMIN QUANTITATIVE: CPT

## 2018-08-27 PROCEDURE — 99999 PR PBB SHADOW E&M-EST. PATIENT-LVL V: CPT | Mod: PBBFAC,,, | Performed by: NURSE PRACTITIONER

## 2018-08-27 PROCEDURE — 82962 GLUCOSE BLOOD TEST: CPT

## 2018-08-27 PROCEDURE — 36415 COLL VENOUS BLD VENIPUNCTURE: CPT | Mod: S$GLB,,, | Performed by: FAMILY MEDICINE

## 2018-08-27 PROCEDURE — 99283 EMERGENCY DEPT VISIT LOW MDM: CPT | Mod: 25

## 2018-08-27 PROCEDURE — 36415 COLL VENOUS BLD VENIPUNCTURE: CPT

## 2018-08-27 PROCEDURE — 93005 ELECTROCARDIOGRAM TRACING: CPT | Mod: S$GLB,,, | Performed by: NURSE PRACTITIONER

## 2018-08-27 PROCEDURE — 80053 COMPREHEN METABOLIC PANEL: CPT | Mod: 91

## 2018-08-27 PROCEDURE — 85025 COMPLETE CBC W/AUTO DIFF WBC: CPT

## 2018-08-27 PROCEDURE — 63600175 PHARM REV CODE 636 W HCPCS: Performed by: NURSE PRACTITIONER

## 2018-08-27 PROCEDURE — 83880 ASSAY OF NATRIURETIC PEPTIDE: CPT

## 2018-08-27 PROCEDURE — 96374 THER/PROPH/DIAG INJ IV PUSH: CPT

## 2018-08-27 PROCEDURE — 83036 HEMOGLOBIN GLYCOSYLATED A1C: CPT

## 2018-08-27 PROCEDURE — 25000003 PHARM REV CODE 250: Performed by: NURSE PRACTITIONER

## 2018-08-27 PROCEDURE — 71045 X-RAY EXAM CHEST 1 VIEW: CPT | Mod: 26,,, | Performed by: RADIOLOGY

## 2018-08-27 PROCEDURE — 99214 OFFICE O/P EST MOD 30 MIN: CPT | Mod: 25,S$GLB,, | Performed by: NURSE PRACTITIONER

## 2018-08-27 PROCEDURE — 80061 LIPID PANEL: CPT

## 2018-08-27 PROCEDURE — 87147 CULTURE TYPE IMMUNOLOGIC: CPT

## 2018-08-27 PROCEDURE — 3077F SYST BP >= 140 MM HG: CPT | Mod: CPTII,S$GLB,, | Performed by: NURSE PRACTITIONER

## 2018-08-27 PROCEDURE — 3046F HEMOGLOBIN A1C LEVEL >9.0%: CPT | Mod: CPTII,S$GLB,, | Performed by: NURSE PRACTITIONER

## 2018-08-27 PROCEDURE — 84481 FREE ASSAY (FT-3): CPT

## 2018-08-27 PROCEDURE — 84439 ASSAY OF FREE THYROXINE: CPT

## 2018-08-27 PROCEDURE — 81025 URINE PREGNANCY TEST: CPT

## 2018-08-27 PROCEDURE — 96361 HYDRATE IV INFUSION ADD-ON: CPT

## 2018-08-27 PROCEDURE — 93010 ELECTROCARDIOGRAM REPORT: CPT | Mod: S$GLB,,, | Performed by: INTERNAL MEDICINE

## 2018-08-27 PROCEDURE — 83735 ASSAY OF MAGNESIUM: CPT

## 2018-08-27 PROCEDURE — 87088 URINE BACTERIA CULTURE: CPT

## 2018-08-27 PROCEDURE — 84443 ASSAY THYROID STIM HORMONE: CPT

## 2018-08-27 PROCEDURE — 81000 URINALYSIS NONAUTO W/SCOPE: CPT

## 2018-08-27 PROCEDURE — 87086 URINE CULTURE/COLONY COUNT: CPT

## 2018-08-27 RX ORDER — TORSEMIDE 20 MG/1
20 TABLET ORAL DAILY
Qty: 30 TABLET | Refills: 2 | Status: SHIPPED | OUTPATIENT
Start: 2018-08-27 | End: 2018-09-05 | Stop reason: SDUPTHER

## 2018-08-27 RX ADMIN — SODIUM CHLORIDE 1000 ML: 0.9 INJECTION, SOLUTION INTRAVENOUS at 11:08

## 2018-08-27 RX ADMIN — INSULIN HUMAN 8 UNITS: 100 INJECTION, SOLUTION PARENTERAL at 11:08

## 2018-08-27 NOTE — PROGRESS NOTES
Abnormal lab - spoke to patient as did Dr. Fong - told to go to hospital for management of blood sugar - states understanding

## 2018-08-27 NOTE — PROGRESS NOTES
Subjective:       Patient ID: Ladonna Hunter is a 52 y.o. female.    Chief Complaint: Shortness of Breath and Edema (ankle right)    Shortness of Breath   Episode onset: 4 months ago. The problem has been waxing and waning. Associated symptoms include leg pain (at night - charley horses) and leg swelling (right leg only). Pertinent negatives include no abdominal pain, ear pain, fever, headaches, neck pain, sore throat, syncope, vomiting or wheezing. Associated symptoms comments: Leg pain at night - stopped metformin and pain went away.     Review of Systems   Constitutional: Positive for fatigue. Negative for appetite change and fever.   HENT: Negative.  Negative for congestion, ear pain and sore throat.    Eyes: Negative.  Negative for visual disturbance.   Respiratory: Positive for shortness of breath. Negative for cough and wheezing.    Cardiovascular: Positive for palpitations (occasionally) and leg swelling (right leg only). Negative for syncope.   Gastrointestinal: Negative.  Negative for abdominal pain, diarrhea, nausea and vomiting.        BM's daily   Endocrine: Negative.    Genitourinary: Negative.  Negative for difficulty urinating and urgency.   Musculoskeletal: Negative.  Negative for arthralgias, myalgias and neck pain.   Skin: Negative.  Negative for color change.   Allergic/Immunologic: Negative.    Neurological: Positive for numbness (and tingling in the feet). Negative for dizziness and headaches.   Hematological: Negative.    Psychiatric/Behavioral: Negative.  Negative for sleep disturbance.   All other systems reviewed and are negative.      Objective:      Physical Exam   Constitutional: She is oriented to person, place, and time. She appears well-developed and well-nourished. No distress.   HENT:   Head: Normocephalic and atraumatic.   Eyes: Pupils are equal, round, and reactive to light.   Neck: Normal range of motion. Neck supple.   Cardiovascular: Normal rate, regular rhythm and intact  distal pulses.   Murmur heard.  Pulmonary/Chest: Effort normal and breath sounds normal. No respiratory distress.   Musculoskeletal: Normal range of motion.   Neurological: She is alert and oriented to person, place, and time.   Skin: Skin is warm and dry.   Psychiatric: She has a normal mood and affect.   Nursing note and vitals reviewed.      Assessment:       1. Type 2 diabetes mellitus without complication, without long-term current use of insulin    2. Essential hypertension    3. Hypothyroidism, unspecified type    4. Hypertriglyceridemia    5. Proteinuria, unspecified type    6. Leg cramps    7. SOB (shortness of breath) on exertion    8. Pedal edema        Plan:   Ladonna was seen today for shortness of breath and edema.    Diagnoses and all orders for this visit:    Type 2 diabetes mellitus without complication, without long-term current use of insulin  -     Comprehensive metabolic panel  -     X-Ray Chest 1 View; Future  -     IN OFFICE EKG 12-LEAD (to [place])  -     Ambulatory referral to Cardiology    Essential hypertension  -     Comprehensive metabolic panel  -     Microalbumin/creatinine urine ratio  -     X-Ray Chest 1 View; Future  -     IN OFFICE EKG 12-LEAD (to [place])  -     Ambulatory referral to Cardiology    Hypothyroidism, unspecified type  -     T4, free  -     T3, free  -     TSH    Hypertriglyceridemia  -     Lipid panel    Proteinuria, unspecified type    Leg cramps  -     Cancel: CBC auto differential  -     Comprehensive metabolic panel  -     Magnesium    SOB (shortness of breath) on exertion  -     X-Ray Chest 1 View; Future  -     IN OFFICE EKG 12-LEAD (to [place])  -     Ambulatory referral to Cardiology    Pedal edema  -     torsemide (DEMADEX) 20 MG Tab; Take 1 tablet (20 mg total) by mouth once daily.  -     Ambulatory referral to Cardiology    RTC for rest of blood work ASAP

## 2018-08-27 NOTE — TELEPHONE ENCOUNTER
Called and discussed elevated blood sugar with patient. She has no insulin at home, only glimepiride and metformin.    Advised to go to ER for IVF and insulin tonight. I doubt this is DKA or HHS as anion gap and bicarb are both normal but would add beta hydroxybutyrate to labs. She voiced understanding and will return.     If we can get it down to a reasonable level this evening can be discharged home from ER with close PCP follow (next 24-48 hours). I maddie recommend starting Lantus or levemir 20 units QHS as well.

## 2018-08-28 LAB
ALBUMIN SERPL BCP-MCNC: 2.7 G/DL
ALP SERPL-CCNC: 82 U/L
ALT SERPL W/O P-5'-P-CCNC: 10 U/L
ANION GAP SERPL CALC-SCNC: 12 MMOL/L
AST SERPL-CCNC: 12 U/L
BILIRUB SERPL-MCNC: 0.3 MG/DL
BUN SERPL-MCNC: 18 MG/DL
CALCIUM SERPL-MCNC: 9.5 MG/DL
CHLORIDE SERPL-SCNC: 98 MMOL/L
CO2 SERPL-SCNC: 23 MMOL/L
CREAT SERPL-MCNC: 1 MG/DL
EST. GFR  (AFRICAN AMERICAN): >60 ML/MIN/1.73 M^2
EST. GFR  (NON AFRICAN AMERICAN): >60 ML/MIN/1.73 M^2
ESTIMATED AVG GLUCOSE: ABNORMAL MG/DL
GLUCOSE SERPL-MCNC: 496 MG/DL
HBA1C MFR BLD HPLC: >14 %
POCT GLUCOSE: 309 MG/DL (ref 70–110)
POTASSIUM SERPL-SCNC: 4.2 MMOL/L
PROT SERPL-MCNC: 7.3 G/DL
SODIUM SERPL-SCNC: 133 MMOL/L

## 2018-08-28 PROCEDURE — 96372 THER/PROPH/DIAG INJ SC/IM: CPT | Mod: 59

## 2018-08-28 PROCEDURE — 82962 GLUCOSE BLOOD TEST: CPT

## 2018-08-28 PROCEDURE — 63600175 PHARM REV CODE 636 W HCPCS: Performed by: SURGERY

## 2018-08-28 RX ORDER — NITROFURANTOIN 25; 75 MG/1; MG/1
100 CAPSULE ORAL 2 TIMES DAILY
Qty: 14 CAPSULE | Refills: 0 | Status: SHIPPED | OUTPATIENT
Start: 2018-08-28 | End: 2018-09-04

## 2018-08-28 RX ORDER — CEFTRIAXONE 1 G/1
1 INJECTION, POWDER, FOR SOLUTION INTRAMUSCULAR; INTRAVENOUS
Status: COMPLETED | OUTPATIENT
Start: 2018-08-28 | End: 2018-08-28

## 2018-08-28 RX ADMIN — CEFTRIAXONE SODIUM 1 G: 1 INJECTION, POWDER, FOR SOLUTION INTRAMUSCULAR; INTRAVENOUS at 01:08

## 2018-08-28 NOTE — ED TRIAGE NOTES
52 y.o. female presents to ER   Chief Complaint   Patient presents with    Hyperglycemia   pt states Dr. Fong called her about her lab work & said her blood sugar was high & she needed to go to the ER for medication. No acute distress noted.

## 2018-08-28 NOTE — ED PROVIDER NOTES
Encounter Date: 2018       History     Chief Complaint   Patient presents with    Hyperglycemia     Patient presents with hyperglycemia.  Reports that her primary care provider called to inform her that her blood sugar was 600.  Patient reports that she stopped taking her metformin approximately 3 weeks ago due to bilateral lower extremity cramping.  Patient reports that she continues to take her glimepiride as previously prescribed.  She reports polyuria and polydipsia.  Denies chest pain, shortness of breath, abdominal pain, or dysuria.  Denies nausea, vomiting, or diarrhea.      The history is provided by the patient.     Review of patient's allergies indicates:  No Known Allergies  Past Medical History:   Diagnosis Date    Anemia     Diabetes mellitus, type 2     Hypertension      Past Surgical History:   Procedure Laterality Date     SECTION      CHOLECYSTECTOMY      TONSILLECTOMY       Family History   Problem Relation Age of Onset    Diverticulitis Mother      Social History     Tobacco Use    Smoking status: Never Smoker    Smokeless tobacco: Never Used   Substance Use Topics    Alcohol use: Yes    Drug use: No     Review of Systems   Constitutional: Negative for chills, fatigue and fever.   HENT: Negative for congestion, dental problem, ear pain, rhinorrhea, sore throat and trouble swallowing.    Eyes: Negative for pain, discharge, redness and visual disturbance.   Respiratory: Negative for cough, shortness of breath and wheezing.    Cardiovascular: Negative for chest pain, palpitations and leg swelling.   Gastrointestinal: Negative for abdominal pain, constipation, diarrhea, nausea and vomiting.   Endocrine: Positive for polydipsia and polyuria. Negative for polyphagia.   Genitourinary: Negative for difficulty urinating, dysuria, flank pain, frequency, hematuria and urgency.   Musculoskeletal: Negative for arthralgias, back pain, myalgias and neck pain.   Skin: Negative for pallor,  rash and wound.   Neurological: Negative for seizures, weakness and headaches.   Psychiatric/Behavioral: Negative.        Physical Exam     Initial Vitals [08/27/18 2154]   BP Pulse Resp Temp SpO2   (!) 151/78 77 16 97.9 °F (36.6 °C) --      MAP       --         Physical Exam    Nursing note and vitals reviewed.  Constitutional: No distress.   HENT:   Head: Normocephalic and atraumatic.   Right Ear: External ear normal.   Left Ear: External ear normal.   Nose: Nose normal.   Mouth/Throat: Oropharynx is clear and moist.   Eyes: Conjunctivae, EOM and lids are normal. Pupils are equal, round, and reactive to light.   Neck: Neck supple.   Cardiovascular: Normal rate, regular rhythm, S1 normal, S2 normal, normal heart sounds and intact distal pulses.   Pulmonary/Chest: Effort normal and breath sounds normal. No respiratory distress.   Abdominal: Soft. Bowel sounds are normal. There is no tenderness.   Musculoskeletal: Normal range of motion.   Neurological: She is alert and oriented to person, place, and time. She has normal strength. GCS eye subscore is 4. GCS verbal subscore is 5. GCS motor subscore is 6.   Skin: Skin is warm and dry. Capillary refill takes less than 2 seconds. No rash noted.   Psychiatric: She has a normal mood and affect. Her speech is normal and behavior is normal.         ED Course   Procedures  Labs Reviewed   URINALYSIS, REFLEX TO URINE CULTURE - Abnormal; Notable for the following components:       Result Value    Protein, UA 2+ (*)     Glucose, UA 2+ (*)     Occult Blood UA 2+ (*)     Leukocytes, UA 1+ (*)     All other components within normal limits    Narrative:     Preferred Collection Type->Urine, Clean Catch   CBC W/ AUTO DIFFERENTIAL - Abnormal; Notable for the following components:    Hemoglobin 11.3 (*)     Hematocrit 35.1 (*)     MCV 69 (*)     MCH 22.3 (*)     RDW 17.7 (*)     All other components within normal limits   COMPREHENSIVE METABOLIC PANEL - Abnormal; Notable for the  following components:    Sodium 133 (*)     Glucose 496 (*)     Albumin 2.7 (*)     All other components within normal limits    Narrative:     Glucose critical result(s) called and verbal readback obtained from   Marc Lejeune, RN, 08/28/2018 00:07   URINALYSIS MICROSCOPIC - Abnormal; Notable for the following components:    RBC, UA 5 (*)     WBC, UA >100 (*)     Bacteria, UA Few (*)     Yeast, UA Moderate (*)     All other components within normal limits    Narrative:     Preferred Collection Type->Urine, Clean Catch   POCT GLUCOSE - Abnormal; Notable for the following components:    POCT Glucose 465 (*)     All other components within normal limits   POCT GLUCOSE - Abnormal; Notable for the following components:    POCT Glucose 432 (*)     All other components within normal limits   POCT GLUCOSE - Abnormal; Notable for the following components:    POCT Glucose 309 (*)     All other components within normal limits   CULTURE, URINE   PREGNANCY TEST, URINE RAPID   B-TYPE NATRIURETIC PEPTIDE   BETA - HYDROXYBUTYRATE, SERUM   POCT GLUCOSE MONITORING CONTINUOUS          Imaging Results    None              Medications   cefTRIAXone injection 1 g (not administered)   sodium chloride 0.9% bolus 1,000 mL (0 mLs Intravenous Stopped 8/28/18 0008)   insulin regular injection 8 Units (8 Units Intravenous Given 8/27/18 5803)            Care assumed per collaborating physician at 2315.              Clinical Impression:   The primary encounter diagnosis was Acute cystitis without hematuria. A diagnosis of Hyperglycemia was also pertinent to this visit.            I took over this patient at the midnight shift change  This patient had elevated blood sugar at the office, noncompliant with metformin  Patient had not been taking metformin several weeks due to cramping side effect  Patient was seen in the ER, she is not DKA, treated with insulin this evening  Blood sugar coming down, patient also has urinary tract infection  PCP will  prescribe the patient a new oral diabetic medicine this week  I prescribe the patient antibiotics for her urinary tract infection                 Claus Ernandez MD  08/28/18 0059

## 2018-08-29 LAB — BACTERIA UR CULT: NORMAL

## 2018-09-05 ENCOUNTER — OFFICE VISIT (OUTPATIENT)
Dept: FAMILY MEDICINE | Facility: CLINIC | Age: 53
End: 2018-09-05
Payer: COMMERCIAL

## 2018-09-05 VITALS
HEIGHT: 65 IN | HEART RATE: 80 BPM | SYSTOLIC BLOOD PRESSURE: 128 MMHG | DIASTOLIC BLOOD PRESSURE: 88 MMHG | BODY MASS INDEX: 35.76 KG/M2 | WEIGHT: 214.63 LBS

## 2018-09-05 DIAGNOSIS — E11.9 TYPE 2 DIABETES MELLITUS WITHOUT COMPLICATION, WITHOUT LONG-TERM CURRENT USE OF INSULIN: Primary | ICD-10-CM

## 2018-09-05 DIAGNOSIS — E03.9 HYPOTHYROIDISM, UNSPECIFIED TYPE: ICD-10-CM

## 2018-09-05 DIAGNOSIS — E78.1 HYPERTRIGLYCERIDEMIA: ICD-10-CM

## 2018-09-05 DIAGNOSIS — I10 ESSENTIAL HYPERTENSION: ICD-10-CM

## 2018-09-05 DIAGNOSIS — R60.0 PEDAL EDEMA: ICD-10-CM

## 2018-09-05 DIAGNOSIS — R80.9 PROTEINURIA, UNSPECIFIED TYPE: ICD-10-CM

## 2018-09-05 DIAGNOSIS — I73.9 CLAUDICATION OF BOTH LOWER EXTREMITIES: ICD-10-CM

## 2018-09-05 PROCEDURE — 99214 OFFICE O/P EST MOD 30 MIN: CPT | Mod: S$GLB,,, | Performed by: NURSE PRACTITIONER

## 2018-09-05 PROCEDURE — 3046F HEMOGLOBIN A1C LEVEL >9.0%: CPT | Mod: CPTII,S$GLB,, | Performed by: NURSE PRACTITIONER

## 2018-09-05 PROCEDURE — 99999 PR PBB SHADOW E&M-EST. PATIENT-LVL III: CPT | Mod: PBBFAC,,, | Performed by: NURSE PRACTITIONER

## 2018-09-05 PROCEDURE — 3079F DIAST BP 80-89 MM HG: CPT | Mod: CPTII,S$GLB,, | Performed by: NURSE PRACTITIONER

## 2018-09-05 PROCEDURE — 3008F BODY MASS INDEX DOCD: CPT | Mod: CPTII,S$GLB,, | Performed by: NURSE PRACTITIONER

## 2018-09-05 PROCEDURE — 3074F SYST BP LT 130 MM HG: CPT | Mod: CPTII,S$GLB,, | Performed by: NURSE PRACTITIONER

## 2018-09-05 RX ORDER — METFORMIN HYDROCHLORIDE 500 MG/1
1000 TABLET ORAL 2 TIMES DAILY WITH MEALS
Qty: 120 TABLET | Refills: 5 | Status: SHIPPED | OUTPATIENT
Start: 2018-09-05 | End: 2019-06-12 | Stop reason: SDUPTHER

## 2018-09-05 RX ORDER — TELMISARTAN 40 MG/1
40 TABLET ORAL DAILY
Qty: 30 TABLET | Refills: 5 | Status: SHIPPED | OUTPATIENT
Start: 2018-09-05 | End: 2019-01-31

## 2018-09-05 RX ORDER — GLIMEPIRIDE 4 MG/1
4 TABLET ORAL 2 TIMES DAILY
Qty: 60 TABLET | Refills: 5 | Status: SHIPPED | OUTPATIENT
Start: 2018-09-05 | End: 2019-01-31

## 2018-09-05 RX ORDER — FENOFIBRATE 134 MG/1
134 CAPSULE ORAL
Qty: 30 CAPSULE | Refills: 5 | Status: SHIPPED | OUTPATIENT
Start: 2018-09-05 | End: 2019-06-12

## 2018-09-05 RX ORDER — LEVOTHYROXINE SODIUM 100 UG/1
100 TABLET ORAL DAILY
Qty: 30 TABLET | Refills: 5 | Status: SHIPPED | OUTPATIENT
Start: 2018-09-05 | End: 2019-01-31

## 2018-09-05 RX ORDER — TORSEMIDE 20 MG/1
20 TABLET ORAL DAILY
Qty: 30 TABLET | Refills: 2 | Status: SHIPPED | OUTPATIENT
Start: 2018-09-05 | End: 2019-01-31 | Stop reason: SDUPTHER

## 2018-09-05 NOTE — PROGRESS NOTES
Subjective:       Patient ID: Ladonna Hunter is a 52 y.o. female.    Chief Complaint: No chief complaint on file.     Patient presents for a follow-up visit.  She was seen last week and had a blood sugar over 400.  She went to the emergency room and they were able to get it down and discharge her home. No c/o today. Will need refill on medications.  Reports increased energy as blood sugar is improving. States no longer having SOB. Stopped drinking regular sodas and following diet. BG higher in the  mg/dl and decreasing to 200 during the day.  Taking Amaryl both in the AM. Will change to one tablet in the am and one in the pm.        Review of Systems   Constitutional: Negative.  Negative for appetite change, fatigue and fever.   HENT: Negative.  Negative for congestion, ear pain and sore throat.    Eyes: Negative.  Negative for visual disturbance.   Respiratory: Negative.  Negative for cough, shortness of breath and wheezing.    Cardiovascular: Negative.    Gastrointestinal: Negative.  Negative for abdominal pain, diarrhea, nausea and vomiting.   Endocrine: Negative.    Genitourinary: Negative.  Negative for difficulty urinating and urgency.   Musculoskeletal: Positive for arthralgias. Negative for myalgias.        Bilateral leg pain with walking.  Relived with rest. She is concerned about peripheral artery disease.   Skin: Negative.  Negative for color change.   Allergic/Immunologic: Negative.    Neurological: Negative.  Negative for dizziness and headaches.   Hematological: Negative.    Psychiatric/Behavioral: Negative.  Negative for sleep disturbance.   All other systems reviewed and are negative.      Objective:      Physical Exam   Constitutional: She is oriented to person, place, and time. She appears well-developed and well-nourished. No distress.   HENT:   Head: Normocephalic and atraumatic.   Eyes: EOM are normal. Pupils are equal, round, and reactive to light.   Neck: Normal range of motion. Neck  supple.   Cardiovascular: Normal rate, regular rhythm, normal heart sounds and intact distal pulses.   No murmur heard.  Pulmonary/Chest: Effort normal and breath sounds normal. No respiratory distress.   Abdominal: Soft.   Musculoskeletal: Normal range of motion. She exhibits edema (  She has edema of the right leg.  It is very mild.).   Edema to right ankle since May.  No pain or decreased sensation.     Neurological: She is alert and oriented to person, place, and time.   Protective Sensation (w/ 10 gram monofilament):  Right: Intact  Left: Intact    Visual Inspection:  Normal -  Bilateral    Pedal Pulses:   Right: Present  Left: Present    Posterior tibialis:   Right:Present  Left: Present   Skin: Skin is warm and dry.   Psychiatric: She has a normal mood and affect.   Nursing note and vitals reviewed.      Assessment:       1. Type 2 diabetes mellitus without complication, without long-term current use of insulin    2. Essential hypertension    3. Hypothyroidism, unspecified type    4. Pedal edema    5. Claudication of both lower extremities    6. Proteinuria, unspecified type    7. Hypertriglyceridemia        Plan:   Diagnoses and all orders for this visit:    Type 2 diabetes mellitus without complication, without long-term current use of insulin  -     telmisartan (MICARDIS) 40 MG Tab; Take 1 tablet (40 mg total) by mouth once daily.  -     fenofibrate micronized (LOFIBRA) 134 MG Cap; Take 1 capsule (134 mg total) by mouth daily with breakfast.  -     metFORMIN (GLUCOPHAGE) 500 MG tablet; Take 2 tablets (1,000 mg total) by mouth 2 (two) times daily with meals.  -     glimepiride (AMARYL) 4 MG tablet; Take 1 tablet (4 mg total) by mouth 2 (two) times daily.    Essential hypertension  -     telmisartan (MICARDIS) 40 MG Tab; Take 1 tablet (40 mg total) by mouth once daily.    Hypothyroidism, unspecified type  -     levothyroxine (SYNTHROID) 100 MCG tablet; Take 1 tablet (100 mcg total) by mouth once  daily.    Pedal edema  -     torsemide (DEMADEX) 20 MG Tab; Take 1 tablet (20 mg total) by mouth once daily.    Claudication of both lower extremities  -     US Lower Extremity Arteries Bilateral; Future    Proteinuria, unspecified type  -     telmisartan (MICARDIS) 40 MG Tab; Take 1 tablet (40 mg total) by mouth once daily.    Hypertriglyceridemia  -     fenofibrate micronized (LOFIBRA) 134 MG Cap; Take 1 capsule (134 mg total) by mouth daily with breakfast.      Return to clinic in 3 months for recheck of A1c.  I did discuss with her the importance of getting her A1c down and blood sugar control.  We talked about the effect that may have on her circulation.

## 2018-09-11 ENCOUNTER — HOSPITAL ENCOUNTER (OUTPATIENT)
Dept: RADIOLOGY | Facility: HOSPITAL | Age: 53
Discharge: HOME | End: 2018-09-11
Attending: NURSE PRACTITIONER
Payer: COMMERCIAL

## 2018-09-11 DIAGNOSIS — I73.9 CLAUDICATION OF BOTH LOWER EXTREMITIES: ICD-10-CM

## 2018-09-11 PROCEDURE — 93925 LOWER EXTREMITY STUDY: CPT | Mod: TC

## 2018-12-07 DIAGNOSIS — E11.9 TYPE 2 DIABETES MELLITUS WITHOUT COMPLICATION: ICD-10-CM

## 2019-01-25 DIAGNOSIS — Z12.11 COLON CANCER SCREENING: ICD-10-CM

## 2019-01-31 ENCOUNTER — HOSPITAL ENCOUNTER (OUTPATIENT)
Dept: RADIOLOGY | Facility: HOSPITAL | Age: 54
Discharge: HOME | End: 2019-01-31
Attending: FAMILY MEDICINE
Payer: COMMERCIAL

## 2019-01-31 ENCOUNTER — OFFICE VISIT (OUTPATIENT)
Dept: FAMILY MEDICINE | Facility: CLINIC | Age: 54
End: 2019-01-31
Payer: COMMERCIAL

## 2019-01-31 VITALS
RESPIRATION RATE: 18 BRPM | HEIGHT: 65 IN | SYSTOLIC BLOOD PRESSURE: 164 MMHG | DIASTOLIC BLOOD PRESSURE: 92 MMHG | WEIGHT: 232.81 LBS | BODY MASS INDEX: 38.79 KG/M2 | HEART RATE: 74 BPM

## 2019-01-31 DIAGNOSIS — E11.65 UNCONTROLLED TYPE 2 DIABETES MELLITUS WITH HYPERGLYCEMIA: ICD-10-CM

## 2019-01-31 DIAGNOSIS — R60.0 PEDAL EDEMA: ICD-10-CM

## 2019-01-31 DIAGNOSIS — R60.0 BILATERAL LEG EDEMA: ICD-10-CM

## 2019-01-31 DIAGNOSIS — E03.9 HYPOTHYROIDISM, UNSPECIFIED TYPE: ICD-10-CM

## 2019-01-31 DIAGNOSIS — R79.89 ELEVATED BRAIN NATRIURETIC PEPTIDE (BNP) LEVEL: ICD-10-CM

## 2019-01-31 DIAGNOSIS — R60.0 BILATERAL LEG EDEMA: Primary | ICD-10-CM

## 2019-01-31 DIAGNOSIS — I10 HYPERTENSION, UNSPECIFIED TYPE: ICD-10-CM

## 2019-01-31 PROCEDURE — 99999 PR PBB SHADOW E&M-EST. PATIENT-LVL III: CPT | Mod: PBBFAC,,, | Performed by: FAMILY MEDICINE

## 2019-01-31 PROCEDURE — 99214 OFFICE O/P EST MOD 30 MIN: CPT | Mod: S$GLB,,, | Performed by: FAMILY MEDICINE

## 2019-01-31 PROCEDURE — 93970 EXTREMITY STUDY: CPT | Mod: 26,,, | Performed by: RADIOLOGY

## 2019-01-31 PROCEDURE — 3077F SYST BP >= 140 MM HG: CPT | Mod: CPTII,S$GLB,, | Performed by: FAMILY MEDICINE

## 2019-01-31 PROCEDURE — 3008F PR BODY MASS INDEX (BMI) DOCUMENTED: ICD-10-PCS | Mod: CPTII,S$GLB,, | Performed by: FAMILY MEDICINE

## 2019-01-31 PROCEDURE — 93970 US LOWER EXTREMITY VEINS BILATERAL: ICD-10-PCS | Mod: 26,,, | Performed by: RADIOLOGY

## 2019-01-31 PROCEDURE — 93970 EXTREMITY STUDY: CPT | Mod: TC

## 2019-01-31 PROCEDURE — 3080F PR MOST RECENT DIASTOLIC BLOOD PRESSURE >= 90 MM HG: ICD-10-PCS | Mod: CPTII,S$GLB,, | Performed by: FAMILY MEDICINE

## 2019-01-31 PROCEDURE — 3080F DIAST BP >= 90 MM HG: CPT | Mod: CPTII,S$GLB,, | Performed by: FAMILY MEDICINE

## 2019-01-31 PROCEDURE — 99214 PR OFFICE/OUTPT VISIT, EST, LEVL IV, 30-39 MIN: ICD-10-PCS | Mod: S$GLB,,, | Performed by: FAMILY MEDICINE

## 2019-01-31 PROCEDURE — 99999 PR PBB SHADOW E&M-EST. PATIENT-LVL III: ICD-10-PCS | Mod: PBBFAC,,, | Performed by: FAMILY MEDICINE

## 2019-01-31 PROCEDURE — 3008F BODY MASS INDEX DOCD: CPT | Mod: CPTII,S$GLB,, | Performed by: FAMILY MEDICINE

## 2019-01-31 PROCEDURE — 3077F PR MOST RECENT SYSTOLIC BLOOD PRESSURE >= 140 MM HG: ICD-10-PCS | Mod: CPTII,S$GLB,, | Performed by: FAMILY MEDICINE

## 2019-01-31 RX ORDER — LEVOTHYROXINE SODIUM 112 UG/1
112 TABLET ORAL
Qty: 30 TABLET | Refills: 2 | Status: SHIPPED | OUTPATIENT
Start: 2019-01-31 | End: 2019-06-12 | Stop reason: SDUPTHER

## 2019-01-31 RX ORDER — VALSARTAN AND HYDROCHLOROTHIAZIDE 80; 12.5 MG/1; MG/1
1 TABLET, FILM COATED ORAL DAILY
Qty: 30 TABLET | Refills: 0 | Status: SHIPPED | OUTPATIENT
Start: 2019-01-31 | End: 2019-02-03

## 2019-01-31 RX ORDER — INSULIN GLARGINE 100 [IU]/ML
20 INJECTION, SOLUTION SUBCUTANEOUS NIGHTLY
Qty: 10 SYRINGE | Refills: 11 | Status: SHIPPED | OUTPATIENT
Start: 2019-01-31 | End: 2019-06-12

## 2019-01-31 RX ORDER — TORSEMIDE 20 MG/1
20 TABLET ORAL DAILY
Qty: 30 TABLET | Refills: 0 | Status: SHIPPED | OUTPATIENT
Start: 2019-01-31 | End: 2019-06-12

## 2019-01-31 NOTE — PROGRESS NOTES
Dictation #1  MRN:[id number]  [facility]:[id number]  Subjective:       Patient ID: Ladonna Hunter is a 53 y.o. female.    Chief Complaint: Edema (Marked swelling in both feet and legs.)    Pt is a 53 y.o. female who presents for evaluation and management of   Encounter Diagnoses   Name Primary?    Bilateral leg edema Yes    Uncontrolled type 2 diabetes mellitus with hyperglycemia     Elevated brain natriuretic peptide (BNP) level     Pedal edema     Hypertension, unspecified type      2 weeks   No recent travel. Sits at a desk up to 15 hours a day     Doing well on current meds. Denies any side effects. Prevention is up to date.    Review of Systems   Constitutional: Negative for fever.   Respiratory: Negative for shortness of breath.    Cardiovascular: Positive for leg swelling. Negative for chest pain.        Leg swelling for couple of months but acutely more swelling and pain noted last 2 weeks   No CHF sx's          Objective:      Physical Exam   Constitutional: She is oriented to person, place, and time. She appears well-developed and well-nourished.   HENT:   Head: Normocephalic and atraumatic.   Right Ear: External ear normal.   Left Ear: External ear normal.   Nose: Nose normal.   Mouth/Throat: Oropharynx is clear and moist.   Eyes: EOM are normal. Pupils are equal, round, and reactive to light.   Neck: Normal range of motion. Neck supple. No JVD present. No tracheal deviation present. No thyromegaly present.   Cardiovascular: Normal rate, normal heart sounds and intact distal pulses.   No murmur heard.  Pulmonary/Chest: Effort normal and breath sounds normal. No respiratory distress. She has no wheezes. She has no rales. She exhibits no tenderness.   Abdominal: Soft. Bowel sounds are normal. She exhibits no distension and no mass. There is no tenderness. There is no rebound and no guarding.   Musculoskeletal: Normal range of motion. She exhibits edema. She exhibits no tenderness.   Right greater than left   She  does have some calf pain and popliteal pain    Lymphadenopathy:     She has no cervical adenopathy.   Neurological: She is alert and oriented to person, place, and time. She has normal reflexes. She displays normal reflexes. No cranial nerve deficit. She exhibits normal muscle tone. Coordination normal.   Skin: Skin is warm and dry. No rash noted. No erythema. No pallor.   Psychiatric: She has a normal mood and affect. Her behavior is normal. Judgment and thought content normal.       Assessment:       1. Bilateral leg edema    2. Uncontrolled type 2 diabetes mellitus with hyperglycemia    3. Elevated brain natriuretic peptide (BNP) level    4. Pedal edema    5. Hypertension, unspecified type        Plan:   Ladonna was seen today for edema.    Diagnoses and all orders for this visit:    Bilateral leg edema  -     Cancel: Ultrasound doppler venous legs bilat ([facility] Only); Future  -     Cancel: Comprehensive metabolic panel; Future  -     Cancel: Brain natriuretic peptide; Future  -     Cancel: Ultrasound doppler venous arms bilateral ([facility] Only); Future  -     TSH; Future  -     Cancel: Ultrasound doppler venous arms bilateral ([facility] Only); Future  -     Cancel: Ultrasound doppler venous legs bilat ([facility] Only); Future  -     Comprehensive metabolic panel; Future  -     Brain natriuretic peptide; Future  -     US Lower Extremity Veins Bilateral; Future    Uncontrolled type 2 diabetes mellitus with hyperglycemia  -     insulin (LANTUS SOLOSTAR U-100 INSULIN) glargine 100 units/mL (3mL) SubQ pen; Inject 20 Units into the skin every evening. Increase lantus insulin by 1-2 units daily until fasting blood sugar is < 120 then stay at that dose. May titrate up to 100 units if needed.    Elevated brain natriuretic peptide (BNP) level  -     Transthoracic echo (TTE) complete ([facility] Only); Future  -     torsemide (DEMADEX) 20 MG Tab; Take 1 tablet (20 mg total) by mouth once daily.  -     valsartan-hydrochlorothiazide  (DIOVAN-HCT) 80-12.5 mg per tablet; Take 1 tablet by mouth once daily.    Pedal edema  -     torsemide (DEMADEX) 20 MG Tab; Take 1 tablet (20 mg total) by mouth once daily.    Hypertension, unspecified type  -     valsartan-hydrochlorothiazide (DIOVAN-HCT) 80-12.5 mg per tablet; Take 1 tablet by mouth once daily.      Problem List Items Addressed This Visit     HTN (hypertension)    Relevant Medications    valsartan-hydrochlorothiazide (DIOVAN-HCT) 80-12.5 mg per tablet      Other Visit Diagnoses     Bilateral leg edema    -  Primary    Relevant Orders    TSH (Completed)    Comprehensive metabolic panel (Completed)    Brain natriuretic peptide (Completed)    US Lower Extremity Veins Bilateral (Completed)    Uncontrolled type 2 diabetes mellitus with hyperglycemia        Relevant Medications    insulin (LANTUS SOLOSTAR U-100 INSULIN) glargine 100 units/mL (3mL) SubQ pen    Elevated brain natriuretic peptide (BNP) level        Relevant Medications    torsemide (DEMADEX) 20 MG Tab    valsartan-hydrochlorothiazide (DIOVAN-HCT) 80-12.5 mg per tablet    Other Relevant Orders    Transthoracic echo (TTE) complete ([facility] Only)    Pedal edema        Relevant Medications    torsemide (DEMADEX) 20 MG Tab      starting insulin   Stop Amaryl   Getting ECHO  Adding ARB, HCT and demedex  RTC 2 weeks     No Follow-up on file.

## 2019-02-01 ENCOUNTER — HOSPITAL ENCOUNTER (OUTPATIENT)
Dept: PULMONOLOGY | Facility: HOSPITAL | Age: 54
Discharge: HOME | End: 2019-02-01
Attending: FAMILY MEDICINE
Payer: COMMERCIAL

## 2019-02-01 ENCOUNTER — TELEPHONE (OUTPATIENT)
Dept: FAMILY MEDICINE | Facility: CLINIC | Age: 54
End: 2019-02-01

## 2019-02-01 VITALS
HEIGHT: 65 IN | SYSTOLIC BLOOD PRESSURE: 164 MMHG | WEIGHT: 215 LBS | DIASTOLIC BLOOD PRESSURE: 92 MMHG | BODY MASS INDEX: 35.82 KG/M2

## 2019-02-01 DIAGNOSIS — R79.89 ELEVATED BRAIN NATRIURETIC PEPTIDE (BNP) LEVEL: ICD-10-CM

## 2019-02-01 DIAGNOSIS — I10 HYPERTENSION, UNSPECIFIED TYPE: ICD-10-CM

## 2019-02-01 LAB
AORTIC ROOT ANNULUS: 2.62 CM
AV INDEX (PROSTH): 0.73
AV MEAN GRADIENT: 5.18 MMHG
AV PEAK GRADIENT: 7.18 MMHG
AV VALVE AREA: 2.48 CM2
AV VELOCITY RATIO: 0.78
BSA FOR ECHO PROCEDURE: 2.11 M2
CV ECHO LV RWT: 0.47 CM
DOP CALC AO PEAK VEL: 1.34 M/S
DOP CALC AO VTI: 34.99 CM
DOP CALC LVOT AREA: 3.4 CM2
DOP CALC LVOT DIAMETER: 2.08 CM
DOP CALC LVOT PEAK VEL: 1.04 M/S
DOP CALC LVOT STROKE VOLUME: 86.71 CM3
DOP CALCLVOT PEAK VEL VTI: 25.53 CM
E WAVE DECELERATION TIME: 143.24 MSEC
E/A RATIO: 0.69
ECHO LV POSTERIOR WALL: 1.12 CM (ref 0.6–1.1)
FRACTIONAL SHORTENING: 29 % (ref 28–44)
INTERVENTRICULAR SEPTUM: 1.07 CM (ref 0.6–1.1)
LA MAJOR: 4.89 CM
LA MINOR: 4.9 CM
LA WIDTH: 4.18 CM
LEFT ATRIUM SIZE: 4.54 CM
LEFT ATRIUM VOLUME INDEX: 38.7 ML/M2
LEFT ATRIUM VOLUME: 78.96 CM3
LEFT INTERNAL DIMENSION IN SYSTOLE: 3.41 CM (ref 2.1–4)
LEFT VENTRICLE DIASTOLIC VOLUME INDEX: 52.82 ML/M2
LEFT VENTRICLE DIASTOLIC VOLUME: 107.74 ML
LEFT VENTRICLE MASS INDEX: 94.5 G/M2
LEFT VENTRICLE SYSTOLIC VOLUME INDEX: 23.4 ML/M2
LEFT VENTRICLE SYSTOLIC VOLUME: 47.71 ML
LEFT VENTRICULAR INTERNAL DIMENSION IN DIASTOLE: 4.8 CM (ref 3.5–6)
LEFT VENTRICULAR MASS: 192.74 G
MV PEAK A VEL: 0.89 M/S
MV PEAK E VEL: 0.61 M/S
PULM VEIN S/D RATIO: 1.24
PV PEAK D VEL: 0.51 M/S
PV PEAK S VEL: 0.63 M/S
PV PEAK VELOCITY: 1.01 CM/S
RA MAJOR: 4.55 CM
RA PRESSURE: 3 MMHG
RA WIDTH: 3 CM
RIGHT VENTRICULAR END-DIASTOLIC DIMENSION: 1.75 CM
TRICUSPID ANNULAR PLANE SYSTOLIC EXCURSION: 2.6 CM

## 2019-02-01 PROCEDURE — 93306 TTE W/DOPPLER COMPLETE: CPT | Mod: 26,,, | Performed by: INTERNAL MEDICINE

## 2019-02-01 PROCEDURE — 93306 TRANSTHORACIC ECHO (TTE) COMPLETE (CUPID ONLY): ICD-10-PCS | Mod: 26,,, | Performed by: INTERNAL MEDICINE

## 2019-02-01 PROCEDURE — 93306 TTE W/DOPPLER COMPLETE: CPT

## 2019-02-01 RX ORDER — VALSARTAN AND HYDROCHLOROTHIAZIDE 80; 12.5 MG/1; MG/1
1 TABLET, FILM COATED ORAL DAILY
Qty: 30 TABLET | Refills: 0 | Status: CANCELLED | OUTPATIENT
Start: 2019-02-01 | End: 2020-02-01

## 2019-02-01 NOTE — TELEPHONE ENCOUNTER
----- Message from Hansel Daugherty MD sent at 1/31/2019  5:34 PM CST -----  TSH abnormal, adjusting thyroid medication, increasing it to 112mcg daily. repeat labs in 6 weeks

## 2019-02-01 NOTE — TELEPHONE ENCOUNTER
Received need for clarification for Valsartan /HCTZ 80-12.5 mg medication on backorder please review thank you.

## 2019-02-03 RX ORDER — IRBESARTAN AND HYDROCHLOROTHIAZIDE 150; 12.5 MG/1; MG/1
1 TABLET, FILM COATED ORAL DAILY
Qty: 30 TABLET | Refills: 3 | Status: SHIPPED | OUTPATIENT
Start: 2019-02-03 | End: 2019-06-12

## 2019-03-25 DIAGNOSIS — B37.9 CANDIDA INFECTION: ICD-10-CM

## 2019-03-25 RX ORDER — FLUCONAZOLE 150 MG/1
TABLET ORAL
Qty: 2 TABLET | Refills: 0 | Status: SHIPPED | OUTPATIENT
Start: 2019-03-25 | End: 2019-05-24 | Stop reason: SDUPTHER

## 2019-05-24 DIAGNOSIS — B37.9 CANDIDA INFECTION: ICD-10-CM

## 2019-05-24 RX ORDER — FLUCONAZOLE 150 MG/1
TABLET ORAL
Qty: 2 TABLET | Refills: 0 | Status: SHIPPED | OUTPATIENT
Start: 2019-05-24 | End: 2019-09-10 | Stop reason: ALTCHOICE

## 2019-05-24 NOTE — TELEPHONE ENCOUNTER
----- Message from Monika Elliott sent at 2019  1:17 PM CDT -----  Contact: self  Ladonna Hunter  MRN: [id number]  : 1965  PCP: Mayra Islas  Home Phone      [phone]  Work Phone      Not on file.  Mobile          [phone]      MESSAGE:   Pt requesting refill or new Rx.   Is this a refill or new RX:  fluconazole (DIFLUCAN) 150 MG Tab  RX name and strength: fluconazole (DIFLUCAN) 150 MG Tab  Last office visit:   Is this a 30-day or 90-day RX:  30  Pharmacy name and location:  [facility]  Comments:  Patient would like a refill before her appointment on .     Phone:  [phone]  [place]isette

## 2019-05-30 DIAGNOSIS — E11.9 TYPE 2 DIABETES MELLITUS WITHOUT COMPLICATION, UNSPECIFIED WHETHER LONG TERM INSULIN USE: ICD-10-CM

## 2019-06-12 ENCOUNTER — OFFICE VISIT (OUTPATIENT)
Dept: FAMILY MEDICINE | Facility: CLINIC | Age: 54
End: 2019-06-12
Payer: COMMERCIAL

## 2019-06-12 ENCOUNTER — TELEPHONE (OUTPATIENT)
Dept: FAMILY MEDICINE | Facility: CLINIC | Age: 54
End: 2019-06-12

## 2019-06-12 VITALS
RESPIRATION RATE: 18 BRPM | HEART RATE: 76 BPM | HEIGHT: 65 IN | WEIGHT: 196.38 LBS | BODY MASS INDEX: 32.72 KG/M2 | DIASTOLIC BLOOD PRESSURE: 80 MMHG | SYSTOLIC BLOOD PRESSURE: 156 MMHG

## 2019-06-12 DIAGNOSIS — R79.89 ELEVATED BRAIN NATRIURETIC PEPTIDE (BNP) LEVEL: ICD-10-CM

## 2019-06-12 DIAGNOSIS — E11.65 UNCONTROLLED TYPE 2 DIABETES MELLITUS WITH HYPERGLYCEMIA: ICD-10-CM

## 2019-06-12 DIAGNOSIS — R82.90 ABNORMAL URINALYSIS: ICD-10-CM

## 2019-06-12 DIAGNOSIS — R53.83 FATIGUE, UNSPECIFIED TYPE: ICD-10-CM

## 2019-06-12 DIAGNOSIS — N32.81 OAB (OVERACTIVE BLADDER): ICD-10-CM

## 2019-06-12 DIAGNOSIS — E78.1 HYPERTRIGLYCERIDEMIA: ICD-10-CM

## 2019-06-12 DIAGNOSIS — R60.0 PEDAL EDEMA: ICD-10-CM

## 2019-06-12 DIAGNOSIS — N30.00 ACUTE CYSTITIS WITHOUT HEMATURIA: ICD-10-CM

## 2019-06-12 DIAGNOSIS — R60.0 BILATERAL LEG EDEMA: ICD-10-CM

## 2019-06-12 DIAGNOSIS — E11.9 TYPE 2 DIABETES MELLITUS WITHOUT COMPLICATION, WITHOUT LONG-TERM CURRENT USE OF INSULIN: ICD-10-CM

## 2019-06-12 DIAGNOSIS — E03.9 HYPOTHYROIDISM, UNSPECIFIED TYPE: ICD-10-CM

## 2019-06-12 DIAGNOSIS — I10 HYPERTENSION, UNSPECIFIED TYPE: Primary | ICD-10-CM

## 2019-06-12 DIAGNOSIS — I73.9 CLAUDICATION OF BOTH LOWER EXTREMITIES: ICD-10-CM

## 2019-06-12 LAB
25(OH)D3+25(OH)D2 SERPL-MCNC: 12 NG/ML (ref 30–96)
ALBUMIN SERPL BCP-MCNC: 2.5 G/DL (ref 3.5–5.2)
ALBUMIN/CREAT UR: 6431.8 UG/MG (ref 0–30)
ALP SERPL-CCNC: 94 U/L (ref 55–135)
ALT SERPL W/O P-5'-P-CCNC: 8 U/L (ref 10–44)
ANION GAP SERPL CALC-SCNC: 12 MMOL/L (ref 8–16)
AST SERPL-CCNC: 9 U/L (ref 10–40)
BACTERIA SPEC CULT: NORMAL
BASOPHILS # BLD AUTO: 0.02 K/UL (ref 0–0.2)
BASOPHILS NFR BLD: 0.2 % (ref 0–1.9)
BILIRUB SERPL-MCNC: 0.3 MG/DL (ref 0.1–1)
BILIRUB SERPL-MCNC: NORMAL MG/DL
BLOOD URINE, POC: NORMAL
BUN SERPL-MCNC: 20 MG/DL (ref 6–20)
CALCIUM SERPL-MCNC: 9.5 MG/DL (ref 8.7–10.5)
CASTS: NORMAL
CHLORIDE SERPL-SCNC: 94 MMOL/L (ref 95–110)
CO2 SERPL-SCNC: 25 MMOL/L (ref 23–29)
COLOR, POC UA: NORMAL
CREAT SERPL-MCNC: 1.4 MG/DL (ref 0.5–1.4)
CREAT UR-MCNC: 17.6 MG/DL (ref 15–325)
CRYSTALS: NORMAL
DIFFERENTIAL METHOD: ABNORMAL
EOSINOPHIL # BLD AUTO: 0.1 K/UL (ref 0–0.5)
EOSINOPHIL NFR BLD: 1.6 % (ref 0–8)
ERYTHROCYTE [DISTWIDTH] IN BLOOD BY AUTOMATED COUNT: 16.3 % (ref 11.5–14.5)
EST. GFR  (AFRICAN AMERICAN): 50 ML/MIN/1.73 M^2
EST. GFR  (NON AFRICAN AMERICAN): 43 ML/MIN/1.73 M^2
GLUCOSE SERPL-MCNC: 688 MG/DL (ref 70–110)
GLUCOSE SERPL-MCNC: >500 MG/DL (ref 70–110)
GLUCOSE UR QL STRIP: 1000
HCT VFR BLD AUTO: 34.2 % (ref 37–48.5)
HGB BLD-MCNC: 10.8 G/DL (ref 12–16)
KETONES UR QL STRIP: NORMAL
LEUKOCYTE ESTERASE URINE, POC: NORMAL
LYMPHOCYTES # BLD AUTO: 2.3 K/UL (ref 1–4.8)
LYMPHOCYTES NFR BLD: 26.5 % (ref 18–48)
MAGNESIUM SERPL-MCNC: 1.8 MG/DL (ref 1.6–2.6)
MCH RBC QN AUTO: 24.5 PG (ref 27–31)
MCHC RBC AUTO-ENTMCNC: 31.6 G/DL (ref 32–36)
MCV RBC AUTO: 78 FL (ref 82–98)
MICROALBUMIN UR DL<=1MG/L-MCNC: 1132 UG/ML
MONOCYTES # BLD AUTO: 0.5 K/UL (ref 0.3–1)
MONOCYTES NFR BLD: 5.7 % (ref 4–15)
NEUTROPHILS # BLD AUTO: 5.7 K/UL (ref 1.8–7.7)
NEUTROPHILS NFR BLD: 66 % (ref 38–73)
NITRITE, POC UA: NORMAL
PH, POC UA: 5
PLATELET # BLD AUTO: 253 K/UL (ref 150–350)
PMV BLD AUTO: 9.7 FL (ref 9.2–12.9)
POTASSIUM SERPL-SCNC: 4.3 MMOL/L (ref 3.5–5.1)
PROT SERPL-MCNC: 7.3 G/DL (ref 6–8.4)
PROTEIN, POC: NORMAL
RBC # BLD AUTO: 4.41 M/UL (ref 4–5.4)
RBC CELLS COUNTED: NORMAL
SODIUM SERPL-SCNC: 131 MMOL/L (ref 136–145)
SPECIFIC GRAVITY, POC UA: 1.01
T4 FREE SERPL-MCNC: 1.08 NG/DL (ref 0.71–1.51)
TSH SERPL DL<=0.005 MIU/L-ACNC: 4.09 UIU/ML (ref 0.4–4)
UROBILINOGEN, POC UA: NORMAL
WBC # BLD AUTO: 8.58 K/UL (ref 3.9–12.7)
WHITE BLOOD CELLS: NORMAL

## 2019-06-12 PROCEDURE — 83735 ASSAY OF MAGNESIUM: CPT

## 2019-06-12 PROCEDURE — 3079F DIAST BP 80-89 MM HG: CPT | Mod: CPTII,S$GLB,, | Performed by: NURSE PRACTITIONER

## 2019-06-12 PROCEDURE — 87186 SC STD MICRODIL/AGAR DIL: CPT

## 2019-06-12 PROCEDURE — 81000 POCT URINE SEDIMENT EXAM: ICD-10-PCS | Mod: S$GLB,,, | Performed by: NURSE PRACTITIONER

## 2019-06-12 PROCEDURE — 82043 UR ALBUMIN QUANTITATIVE: CPT

## 2019-06-12 PROCEDURE — 80053 COMPREHEN METABOLIC PANEL: CPT

## 2019-06-12 PROCEDURE — 99999 PR PBB SHADOW E&M-EST. PATIENT-LVL III: CPT | Mod: PBBFAC,,, | Performed by: NURSE PRACTITIONER

## 2019-06-12 PROCEDURE — 99999 PR PBB SHADOW E&M-EST. PATIENT-LVL III: ICD-10-PCS | Mod: PBBFAC,,, | Performed by: NURSE PRACTITIONER

## 2019-06-12 PROCEDURE — 85025 COMPLETE CBC W/AUTO DIFF WBC: CPT

## 2019-06-12 PROCEDURE — 82306 VITAMIN D 25 HYDROXY: CPT

## 2019-06-12 PROCEDURE — 3008F BODY MASS INDEX DOCD: CPT | Mod: CPTII,S$GLB,, | Performed by: NURSE PRACTITIONER

## 2019-06-12 PROCEDURE — 82607 VITAMIN B-12: CPT

## 2019-06-12 PROCEDURE — 3008F PR BODY MASS INDEX (BMI) DOCUMENTED: ICD-10-PCS | Mod: CPTII,S$GLB,, | Performed by: NURSE PRACTITIONER

## 2019-06-12 PROCEDURE — 84439 ASSAY OF FREE THYROXINE: CPT

## 2019-06-12 PROCEDURE — 87086 URINE CULTURE/COLONY COUNT: CPT

## 2019-06-12 PROCEDURE — 99215 PR OFFICE/OUTPT VISIT, EST, LEVL V, 40-54 MIN: ICD-10-PCS | Mod: 25,S$GLB,, | Performed by: NURSE PRACTITIONER

## 2019-06-12 PROCEDURE — 84207 ASSAY OF VITAMIN B-6: CPT

## 2019-06-12 PROCEDURE — 81000 URINALYSIS NONAUTO W/SCOPE: CPT | Mod: S$GLB,,, | Performed by: NURSE PRACTITIONER

## 2019-06-12 PROCEDURE — 82962 GLUCOSE BLOOD TEST: CPT | Mod: S$GLB,,, | Performed by: NURSE PRACTITIONER

## 2019-06-12 PROCEDURE — 3077F SYST BP >= 140 MM HG: CPT | Mod: CPTII,S$GLB,, | Performed by: NURSE PRACTITIONER

## 2019-06-12 PROCEDURE — 87088 URINE BACTERIA CULTURE: CPT

## 2019-06-12 PROCEDURE — 84481 FREE ASSAY (FT-3): CPT

## 2019-06-12 PROCEDURE — 3077F PR MOST RECENT SYSTOLIC BLOOD PRESSURE >= 140 MM HG: ICD-10-PCS | Mod: CPTII,S$GLB,, | Performed by: NURSE PRACTITIONER

## 2019-06-12 PROCEDURE — 87077 CULTURE AEROBIC IDENTIFY: CPT

## 2019-06-12 PROCEDURE — 99215 OFFICE O/P EST HI 40 MIN: CPT | Mod: 25,S$GLB,, | Performed by: NURSE PRACTITIONER

## 2019-06-12 PROCEDURE — 82962 POCT GLUCOSE, HAND-HELD DEVICE: ICD-10-PCS | Mod: S$GLB,,, | Performed by: NURSE PRACTITIONER

## 2019-06-12 PROCEDURE — 83036 HEMOGLOBIN GLYCOSYLATED A1C: CPT

## 2019-06-12 PROCEDURE — 3079F PR MOST RECENT DIASTOLIC BLOOD PRESSURE 80-89 MM HG: ICD-10-PCS | Mod: CPTII,S$GLB,, | Performed by: NURSE PRACTITIONER

## 2019-06-12 PROCEDURE — 84443 ASSAY THYROID STIM HORMONE: CPT

## 2019-06-12 RX ORDER — METFORMIN HYDROCHLORIDE 500 MG/1
1000 TABLET ORAL 2 TIMES DAILY WITH MEALS
Qty: 120 TABLET | Refills: 5 | Status: SHIPPED | OUTPATIENT
Start: 2019-06-12 | End: 2019-10-14

## 2019-06-12 RX ORDER — OXYBUTYNIN CHLORIDE 10 MG/1
10 TABLET, EXTENDED RELEASE ORAL DAILY
Qty: 30 TABLET | Refills: 11 | Status: SHIPPED | OUTPATIENT
Start: 2019-06-12 | End: 2020-08-31

## 2019-06-12 RX ORDER — TORSEMIDE 20 MG/1
20 TABLET ORAL DAILY
Qty: 30 TABLET | Refills: 5 | Status: ON HOLD | OUTPATIENT
Start: 2019-06-12 | End: 2020-09-24

## 2019-06-12 RX ORDER — FENOFIBRATE 134 MG/1
134 CAPSULE ORAL
Qty: 30 CAPSULE | Refills: 5 | Status: SHIPPED | OUTPATIENT
Start: 2019-06-12 | End: 2019-10-14 | Stop reason: SDUPTHER

## 2019-06-12 RX ORDER — LEVOTHYROXINE SODIUM 112 UG/1
112 TABLET ORAL
Qty: 30 TABLET | Refills: 5 | Status: SHIPPED | OUTPATIENT
Start: 2019-06-12 | End: 2019-10-18 | Stop reason: SDUPTHER

## 2019-06-12 RX ORDER — GLIMEPIRIDE 4 MG/1
4 TABLET ORAL
Qty: 30 TABLET | Refills: 5 | Status: SHIPPED | OUTPATIENT
Start: 2019-06-12 | End: 2019-12-20 | Stop reason: DRUGHIGH

## 2019-06-12 RX ORDER — CEPHALEXIN 500 MG/1
500 CAPSULE ORAL EVERY 12 HOURS
Qty: 20 CAPSULE | Refills: 0 | Status: SHIPPED | OUTPATIENT
Start: 2019-06-12 | End: 2019-06-22

## 2019-06-12 RX ORDER — IRBESARTAN AND HYDROCHLOROTHIAZIDE 150; 12.5 MG/1; MG/1
1 TABLET, FILM COATED ORAL DAILY
Qty: 30 TABLET | Refills: 5 | Status: SHIPPED | OUTPATIENT
Start: 2019-06-12 | End: 2019-09-16 | Stop reason: RX

## 2019-06-12 NOTE — TELEPHONE ENCOUNTER
Called for pt LM to return call. If she is not feeling well needs to get someone to take her to the ER.

## 2019-06-12 NOTE — TELEPHONE ENCOUNTER
[phone]  Notified Dr. Padilla.  Instructed to have patient drink plenty of fluids and take her meds, if she starts vomiting, go to ER.  Attempted to call patientANTOINE.

## 2019-06-12 NOTE — PROGRESS NOTES
Subjective:       Patient ID: Ladonna Hunter is a 53 y.o. female.    Chief Complaint: decreased energy and Foot Swelling    Here for check up and feels tired, low energy, foot swelling.    Fatigue   This is a new problem. The current episode started more than 1 month ago. The problem has been gradually worsening. Associated symptoms include fatigue, myalgias (legs hurt when walking especially going up stairs) and numbness (feet). Pertinent negatives include no abdominal pain, arthralgias, congestion, coughing, fever, headaches, nausea, sore throat or vomiting.     Review of Systems   Constitutional: Positive for fatigue. Negative for appetite change and fever.   HENT: Negative.  Negative for congestion, ear pain and sore throat.    Eyes: Negative.  Negative for visual disturbance.   Respiratory: Negative.  Negative for cough, shortness of breath and wheezing.    Cardiovascular: Negative.    Gastrointestinal: Negative.  Negative for abdominal pain, diarrhea, nausea and vomiting.   Endocrine: Negative.    Genitourinary: Negative.  Negative for difficulty urinating and urgency.   Musculoskeletal: Positive for myalgias (legs hurt when walking especially going up stairs). Negative for arthralgias.   Skin: Negative.  Negative for color change.   Allergic/Immunologic: Negative.    Neurological: Positive for numbness (feet). Negative for dizziness and headaches.   Hematological: Negative.    Psychiatric/Behavioral: Negative.  Negative for sleep disturbance.   All other systems reviewed and are negative.      Objective:      Physical Exam   Constitutional: She is oriented to person, place, and time. She appears well-developed and well-nourished. No distress.   HENT:   Head: Normocephalic and atraumatic.   Eyes: Pupils are equal, round, and reactive to light.   Neck: Normal range of motion. Neck supple.   Cardiovascular: Normal rate, regular rhythm and normal heart sounds.   No murmur heard.  Pulmonary/Chest: Effort normal  and breath sounds normal. No respiratory distress.   Musculoskeletal: Normal range of motion.   Neurological: She is alert and oriented to person, place, and time.   Skin: Skin is warm and dry.   Psychiatric: She has a normal mood and affect.   Nursing note and vitals reviewed.      Assessment:       1. Hypertension, unspecified type    2. Hypothyroidism, unspecified type    3. Bilateral leg edema    4. Uncontrolled type 2 diabetes mellitus with hyperglycemia    5. Hypertriglyceridemia    6. Claudication of both lower extremities    7. Elevated brain natriuretic peptide (BNP) level    8. Pedal edema    9. Type 2 diabetes mellitus without complication, without long-term current use of insulin    10. OAB (overactive bladder)    11. Fatigue, unspecified type    12. Abnormal urinalysis    13. Acute cystitis without hematuria        Plan:   Ladonna was seen today for decreased energy and foot swelling.    Diagnoses and all orders for this visit:    Hypertension, unspecified type  -     irbesartan-hydrochlorothiazide (AVALIDE) 150-12.5 mg per tablet; Take 1 tablet by mouth once daily.  -     Comprehensive metabolic panel  -     Microalbumin/creatinine urine ratio    Hypothyroidism, unspecified type  -     levothyroxine (SYNTHROID) 112 MCG tablet; Take 1 tablet (112 mcg total) by mouth before breakfast.  -     TSH  -     T4, free  -     T3, free    Bilateral leg edema    Uncontrolled type 2 diabetes mellitus with hyperglycemia  -     glimepiride (AMARYL) 4 MG tablet; Take 1 tablet (4 mg total) by mouth daily with breakfast.  -     POCT Glucose, Hand-Held Device  -     Hemoglobin A1c  -     CBC auto differential  -     Microalbumin/creatinine urine ratio  -     POCT urinalysis, dipstick or tablet reag    Hypertriglyceridemia  -     fenofibrate micronized (LOFIBRA) 134 MG Cap; Take 1 capsule (134 mg total) by mouth daily with breakfast.    Claudication of both lower extremities  -     Magnesium    Elevated brain natriuretic  peptide (BNP) level  -     irbesartan-hydrochlorothiazide (AVALIDE) 150-12.5 mg per tablet; Take 1 tablet by mouth once daily.  -     torsemide (DEMADEX) 20 MG Tab; Take 1 tablet (20 mg total) by mouth once daily.    Pedal edema  -     torsemide (DEMADEX) 20 MG Tab; Take 1 tablet (20 mg total) by mouth once daily.    Type 2 diabetes mellitus without complication, without long-term current use of insulin  -     fenofibrate micronized (LOFIBRA) 134 MG Cap; Take 1 capsule (134 mg total) by mouth daily with breakfast.  -     metFORMIN (GLUCOPHAGE) 500 MG tablet; Take 2 tablets (1,000 mg total) by mouth 2 (two) times daily with meals.    OAB (overactive bladder)  -     oxybutynin (DITROPAN-XL) 10 MG 24 hr tablet; Take 1 tablet (10 mg total) by mouth once daily.    Fatigue, unspecified type  -     TSH  -     T4, free  -     T3, free  -     Vitamin D  -     Vitamin B12  -     Vitamin B6    Abnormal urinalysis  -     POCT URINE SEDIMENT EXAM  -     Urine culture    Acute cystitis without hematuria  -     cephALEXin (KEFLEX) 500 MG capsule; Take 1 capsule (500 mg total) by mouth every 12 (twelve) hours. for 10 days    RTC 2 weeks for recheck

## 2019-06-13 LAB
ESTIMATED AVG GLUCOSE: ABNORMAL MG/DL (ref 68–131)
HBA1C MFR BLD HPLC: >14 % (ref 4–5.6)
T3FREE SERPL-MCNC: 1.7 PG/ML (ref 2.3–4.2)
VIT B12 SERPL-MCNC: 707 PG/ML (ref 210–950)

## 2019-06-13 NOTE — TELEPHONE ENCOUNTER
Spoke with pt--discussed elevated b/s and recommendations per Dr Padilla, voiced understanding. Rest of lab results pending review by Denisha, please review and advise, thank you.

## 2019-06-14 ENCOUNTER — TELEPHONE (OUTPATIENT)
Dept: FAMILY MEDICINE | Facility: CLINIC | Age: 54
End: 2019-06-14

## 2019-06-14 DIAGNOSIS — I10 HYPERTENSION, UNSPECIFIED TYPE: Primary | ICD-10-CM

## 2019-06-14 DIAGNOSIS — Z12.39 BREAST CANCER SCREENING: ICD-10-CM

## 2019-06-14 RX ORDER — LOSARTAN POTASSIUM AND HYDROCHLOROTHIAZIDE 12.5; 1 MG/1; MG/1
1 TABLET ORAL DAILY
Qty: 30 TABLET | Refills: 5 | Status: SHIPPED | OUTPATIENT
Start: 2019-06-14 | End: 2020-03-18 | Stop reason: RX

## 2019-06-14 NOTE — TELEPHONE ENCOUNTER
Received fax from [facility] that the Irbesar/-12.5mg is currently on back order, can you please change to something else?

## 2019-06-14 NOTE — TELEPHONE ENCOUNTER
----- Message from Beth Perez sent at 2019  1:17 PM CDT -----  Contact: self  Ladonna Hunter  MRN: [id number]  : 1965  PCP: Mayra Islas  Home Phone      [phone]  Work Phone      Not on file.  Mobile          [phone]      MESSAGE:   Patient is returning a phone call.  Who left a message for the patient:    Does patient know what this is regarding:  No  Comments:      Phone: Work [phone]

## 2019-06-15 LAB — BACTERIA UR CULT: NORMAL

## 2019-06-17 ENCOUNTER — TELEPHONE (OUTPATIENT)
Dept: FAMILY MEDICINE | Facility: CLINIC | Age: 54
End: 2019-06-17

## 2019-06-17 LAB — PYRIDOXAL SERPL-MCNC: 2 UG/L (ref 5–50)

## 2019-06-17 NOTE — TELEPHONE ENCOUNTER
----- Message from Mayra Islas NP sent at 6/17/2019  8:22 AM CDT -----  Abnormal lab - + UTI - keflex should cover - she really needs to come in for further diabetes treatment

## 2019-06-17 NOTE — PROGRESS NOTES
Abnormal lab - + UTI - keflex should cover - she really needs to come in for further diabetes treatment

## 2019-06-19 NOTE — TELEPHONE ENCOUNTER
Pt notified of results and verbalized understanding. appt scheduled     Pt stated she would be going out of town tomorrow and returning Monday

## 2019-06-20 NOTE — PROGRESS NOTES
Abnormal lab. B6 is low. A1c is way high. I've tried to call her several times to come in for medication changes. Please try again

## 2019-06-20 NOTE — TELEPHONE ENCOUNTER
I have tried to call her several times and left voice mail for her. She needs to come in for medication adjustment. She has not returned my calls

## 2019-06-27 ENCOUNTER — OFFICE VISIT (OUTPATIENT)
Dept: FAMILY MEDICINE | Facility: CLINIC | Age: 54
End: 2019-06-27
Payer: COMMERCIAL

## 2019-06-27 ENCOUNTER — TELEPHONE (OUTPATIENT)
Dept: FAMILY MEDICINE | Facility: CLINIC | Age: 54
End: 2019-06-27

## 2019-06-27 VITALS
WEIGHT: 193.19 LBS | HEART RATE: 76 BPM | BODY MASS INDEX: 32.19 KG/M2 | SYSTOLIC BLOOD PRESSURE: 166 MMHG | DIASTOLIC BLOOD PRESSURE: 80 MMHG | HEIGHT: 65 IN | RESPIRATION RATE: 18 BRPM

## 2019-06-27 DIAGNOSIS — E11.65 UNCONTROLLED TYPE 2 DIABETES MELLITUS WITH HYPERGLYCEMIA: Primary | ICD-10-CM

## 2019-06-27 DIAGNOSIS — R79.89 LOW VITAMIN D LEVEL: ICD-10-CM

## 2019-06-27 LAB
ANION GAP SERPL CALC-SCNC: 8 MMOL/L (ref 8–16)
BUN SERPL-MCNC: 24 MG/DL (ref 6–20)
CALCIUM SERPL-MCNC: 8.7 MG/DL (ref 8.7–10.5)
CHLORIDE SERPL-SCNC: 102 MMOL/L (ref 95–110)
CO2 SERPL-SCNC: 25 MMOL/L (ref 23–29)
CREAT SERPL-MCNC: 1.4 MG/DL (ref 0.5–1.4)
EST. GFR  (AFRICAN AMERICAN): 50 ML/MIN/1.73 M^2
EST. GFR  (NON AFRICAN AMERICAN): 43 ML/MIN/1.73 M^2
GLUCOSE SERPL-MCNC: 544 MG/DL (ref 70–110)
GLUCOSE SERPL-MCNC: >500 MG/DL (ref 70–110)
POTASSIUM SERPL-SCNC: 4.7 MMOL/L (ref 3.5–5.1)
SODIUM SERPL-SCNC: 135 MMOL/L (ref 136–145)

## 2019-06-27 PROCEDURE — 3077F SYST BP >= 140 MM HG: CPT | Mod: CPTII,S$GLB,, | Performed by: NURSE PRACTITIONER

## 2019-06-27 PROCEDURE — 99214 OFFICE O/P EST MOD 30 MIN: CPT | Mod: S$GLB,,, | Performed by: NURSE PRACTITIONER

## 2019-06-27 PROCEDURE — 99999 PR PBB SHADOW E&M-EST. PATIENT-LVL IV: CPT | Mod: PBBFAC,,, | Performed by: NURSE PRACTITIONER

## 2019-06-27 PROCEDURE — 99999 PR PBB SHADOW E&M-EST. PATIENT-LVL IV: ICD-10-PCS | Mod: PBBFAC,,, | Performed by: NURSE PRACTITIONER

## 2019-06-27 PROCEDURE — 3079F DIAST BP 80-89 MM HG: CPT | Mod: CPTII,S$GLB,, | Performed by: NURSE PRACTITIONER

## 2019-06-27 PROCEDURE — 3077F PR MOST RECENT SYSTOLIC BLOOD PRESSURE >= 140 MM HG: ICD-10-PCS | Mod: CPTII,S$GLB,, | Performed by: NURSE PRACTITIONER

## 2019-06-27 PROCEDURE — 36415 PR COLLECTION VENOUS BLOOD,VENIPUNCTURE: ICD-10-PCS | Mod: S$GLB,,, | Performed by: FAMILY MEDICINE

## 2019-06-27 PROCEDURE — 82962 GLUCOSE BLOOD TEST: CPT | Mod: S$GLB,,, | Performed by: NURSE PRACTITIONER

## 2019-06-27 PROCEDURE — 80048 BASIC METABOLIC PNL TOTAL CA: CPT

## 2019-06-27 PROCEDURE — 3079F PR MOST RECENT DIASTOLIC BLOOD PRESSURE 80-89 MM HG: ICD-10-PCS | Mod: CPTII,S$GLB,, | Performed by: NURSE PRACTITIONER

## 2019-06-27 PROCEDURE — 3008F BODY MASS INDEX DOCD: CPT | Mod: CPTII,S$GLB,, | Performed by: NURSE PRACTITIONER

## 2019-06-27 PROCEDURE — 3008F PR BODY MASS INDEX (BMI) DOCUMENTED: ICD-10-PCS | Mod: CPTII,S$GLB,, | Performed by: NURSE PRACTITIONER

## 2019-06-27 PROCEDURE — 36415 COLL VENOUS BLD VENIPUNCTURE: CPT | Mod: S$GLB,,, | Performed by: FAMILY MEDICINE

## 2019-06-27 PROCEDURE — 82962 POCT GLUCOSE, HAND-HELD DEVICE: ICD-10-PCS | Mod: S$GLB,,, | Performed by: NURSE PRACTITIONER

## 2019-06-27 PROCEDURE — 99214 PR OFFICE/OUTPT VISIT, EST, LEVL IV, 30-39 MIN: ICD-10-PCS | Mod: S$GLB,,, | Performed by: NURSE PRACTITIONER

## 2019-06-27 RX ORDER — INSULIN ASPART 100 [IU]/ML
10 INJECTION, SOLUTION INTRAVENOUS; SUBCUTANEOUS
Qty: 15 ML | Refills: 1 | Status: SHIPPED | OUTPATIENT
Start: 2019-06-27 | End: 2020-04-29

## 2019-06-27 RX ORDER — ERGOCALCIFEROL 1.25 MG/1
50000 CAPSULE ORAL
Qty: 12 CAPSULE | Refills: 3 | Status: SHIPPED | OUTPATIENT
Start: 2019-06-27 | End: 2020-05-11 | Stop reason: SDUPTHER

## 2019-06-27 RX ORDER — BLOOD SUGAR DIAGNOSTIC
STRIP MISCELLANEOUS
Qty: 100 EACH | Refills: 1 | Status: SHIPPED | OUTPATIENT
Start: 2019-06-27

## 2019-06-27 RX ORDER — INSULIN GLARGINE 100 [IU]/ML
20 INJECTION, SOLUTION SUBCUTANEOUS NIGHTLY
Qty: 15 ML | Refills: 1 | Status: SHIPPED | OUTPATIENT
Start: 2019-06-27 | End: 2019-10-18 | Stop reason: SDUPTHER

## 2019-06-27 NOTE — PROGRESS NOTES
"Subjective:       Patient ID: Ladonna Hunter is a 53 y.o. female.    Chief Complaint: Follow-up and Hypertension (didnt take meds today,"i forgot")    Here for follow up for diabetes with elevated blood sugar and elevated A1c.    Review of Systems   Constitutional: Negative.  Negative for appetite change, fatigue and fever.   HENT: Negative.  Negative for congestion, ear pain and sore throat.    Eyes: Negative.  Negative for visual disturbance.   Respiratory: Negative.  Negative for cough, shortness of breath and wheezing.    Cardiovascular: Negative.    Gastrointestinal: Negative.  Negative for abdominal pain, diarrhea, nausea and vomiting.   Endocrine: Negative.    Genitourinary: Negative.  Negative for difficulty urinating and urgency.   Musculoskeletal: Negative.  Negative for arthralgias and myalgias.   Skin: Negative.  Negative for color change.   Allergic/Immunologic: Negative.    Neurological: Negative.  Negative for dizziness and headaches.   Hematological: Negative.    Psychiatric/Behavioral: Negative.  Negative for sleep disturbance.   All other systems reviewed and are negative.      Objective:      Physical Exam   Constitutional: She is oriented to person, place, and time. She appears well-developed and well-nourished. No distress.   HENT:   Head: Normocephalic and atraumatic.   Eyes: Pupils are equal, round, and reactive to light.   Neck: Normal range of motion. Neck supple.   Cardiovascular: Normal rate, regular rhythm and normal heart sounds.   No murmur heard.  Pulmonary/Chest: Effort normal and breath sounds normal. No respiratory distress.   Musculoskeletal: Normal range of motion.   Neurological: She is alert and oriented to person, place, and time.   Skin: Skin is warm and dry.   Psychiatric: She has a normal mood and affect.   Nursing note and vitals reviewed.      Assessment:       1. Uncontrolled type 2 diabetes mellitus with hyperglycemia    2. Low vitamin D level        Plan:   Ladonna was " "seen today for follow-up and hypertension.    Diagnoses and all orders for this visit:    Uncontrolled type 2 diabetes mellitus with hyperglycemia  -     flash glucose sensor (FREESTYLE DARIUS 14 DAY SENSOR) Kit; Blood sugars AC and HS to slide  -     flash glucose scanning reader (FREESTYLE DARIUS 14 DAY READER) Misc; Blood sugar readings AC and HS  -     insulin (LANTUS SOLOSTAR U-100 INSULIN) glargine 100 units/mL (3mL) SubQ pen; Inject 20 Units into the skin every evening.  -     insulin aspart U-100 (NOVOLOG FLEXPEN U-100 INSULIN) 100 unit/mL (3 mL) InPn pen; Inject 10 Units into the skin 3 (three) times daily with meals.  -     pen needle, diabetic 32 gauge x 1/4" Ndle; Use to inject with insulin four times a day  -     POCT Glucose - > 500  -     Basic metabolic panel    Low vitamin D level  -     ergocalciferol (ERGOCALCIFEROL) 50,000 unit Cap; Take 1 capsule (50,000 Units total) by mouth every 7 days.    RTC 2 months for recheck  "

## 2019-06-27 NOTE — TELEPHONE ENCOUNTER
Critical called.  Blood sugar >500.  Patient didn't answer phone.  Left message. She has no gap. Rest of bmp is essentially same as previous.

## 2019-07-01 ENCOUNTER — TELEPHONE (OUTPATIENT)
Dept: FAMILY MEDICINE | Facility: CLINIC | Age: 54
End: 2019-07-01

## 2019-07-01 ENCOUNTER — PATIENT MESSAGE (OUTPATIENT)
Dept: FAMILY MEDICINE | Facility: CLINIC | Age: 54
End: 2019-07-01

## 2019-07-01 NOTE — PROGRESS NOTES
Abnormal lab - she does not answer her calls. She was started on insulin, basalar and pre-meal at this visit. Will see her back on the 29 of August - sooner if necessary

## 2019-07-01 NOTE — TELEPHONE ENCOUNTER
----- Message from Mayra Islas NP sent at 7/1/2019 10:06 AM CDT -----  Abnormal lab - she does not answer her calls. She was started on insulin, basalar and pre-meal at this visit. Will see her back on the 29 of August - sooner if necessary

## 2019-07-01 NOTE — TELEPHONE ENCOUNTER
Attempted to contact pt--left  msg to call back. Per Denisha Islas CNP, several attempts made to contact pt about elevated b/s. Pt has been put on insulin and also [facility] is trying to get a specific glucometer covered so pt doesn't have to do accu ck so many times a day. Unable to contact pt to get an update on how she is feeling.

## 2019-08-07 ENCOUNTER — PATIENT MESSAGE (OUTPATIENT)
Dept: INTERNAL MEDICINE | Facility: CLINIC | Age: 54
End: 2019-08-07

## 2019-09-05 DIAGNOSIS — E11.9 TYPE 2 DIABETES MELLITUS WITHOUT COMPLICATION: ICD-10-CM

## 2019-09-10 ENCOUNTER — LAB VISIT (OUTPATIENT)
Dept: LAB | Facility: HOSPITAL | Age: 54
End: 2019-09-10
Attending: INTERNAL MEDICINE
Payer: COMMERCIAL

## 2019-09-10 ENCOUNTER — OFFICE VISIT (OUTPATIENT)
Dept: INTERNAL MEDICINE | Facility: CLINIC | Age: 54
End: 2019-09-10
Payer: COMMERCIAL

## 2019-09-10 VITALS
HEART RATE: 76 BPM | OXYGEN SATURATION: 97 % | HEIGHT: 65 IN | SYSTOLIC BLOOD PRESSURE: 152 MMHG | BODY MASS INDEX: 35.26 KG/M2 | DIASTOLIC BLOOD PRESSURE: 80 MMHG | WEIGHT: 211.63 LBS

## 2019-09-10 DIAGNOSIS — R60.0 BILATERAL LEG EDEMA: Primary | ICD-10-CM

## 2019-09-10 DIAGNOSIS — Z12.11 COLON CANCER SCREENING: ICD-10-CM

## 2019-09-10 DIAGNOSIS — R15.9 INCONTINENCE OF FECES, UNSPECIFIED FECAL INCONTINENCE TYPE: ICD-10-CM

## 2019-09-10 DIAGNOSIS — R60.0 BILATERAL LEG EDEMA: ICD-10-CM

## 2019-09-10 LAB
ANION GAP SERPL CALC-SCNC: 8 MMOL/L (ref 8–16)
BUN SERPL-MCNC: 20 MG/DL (ref 6–20)
CALCIUM SERPL-MCNC: 8.8 MG/DL (ref 8.7–10.5)
CHLORIDE SERPL-SCNC: 106 MMOL/L (ref 95–110)
CO2 SERPL-SCNC: 24 MMOL/L (ref 23–29)
CREAT SERPL-MCNC: 1.2 MG/DL (ref 0.5–1.4)
EST. GFR  (AFRICAN AMERICAN): >60 ML/MIN/1.73 M^2
EST. GFR  (NON AFRICAN AMERICAN): 52 ML/MIN/1.73 M^2
GLUCOSE SERPL-MCNC: 260 MG/DL (ref 70–110)
POTASSIUM SERPL-SCNC: 4.3 MMOL/L (ref 3.5–5.1)
SODIUM SERPL-SCNC: 138 MMOL/L (ref 136–145)

## 2019-09-10 PROCEDURE — 99999 PR PBB SHADOW E&M-EST. PATIENT-LVL III: ICD-10-PCS | Mod: PBBFAC,,, | Performed by: INTERNAL MEDICINE

## 2019-09-10 PROCEDURE — 99214 PR OFFICE/OUTPT VISIT, EST, LEVL IV, 30-39 MIN: ICD-10-PCS | Mod: S$GLB,,, | Performed by: INTERNAL MEDICINE

## 2019-09-10 PROCEDURE — 3077F PR MOST RECENT SYSTOLIC BLOOD PRESSURE >= 140 MM HG: ICD-10-PCS | Mod: CPTII,S$GLB,, | Performed by: INTERNAL MEDICINE

## 2019-09-10 PROCEDURE — 99999 PR PBB SHADOW E&M-EST. PATIENT-LVL III: CPT | Mod: PBBFAC,,, | Performed by: INTERNAL MEDICINE

## 2019-09-10 PROCEDURE — 80048 BASIC METABOLIC PNL TOTAL CA: CPT

## 2019-09-10 PROCEDURE — 3079F PR MOST RECENT DIASTOLIC BLOOD PRESSURE 80-89 MM HG: ICD-10-PCS | Mod: CPTII,S$GLB,, | Performed by: INTERNAL MEDICINE

## 2019-09-10 PROCEDURE — 3079F DIAST BP 80-89 MM HG: CPT | Mod: CPTII,S$GLB,, | Performed by: INTERNAL MEDICINE

## 2019-09-10 PROCEDURE — 3008F BODY MASS INDEX DOCD: CPT | Mod: CPTII,S$GLB,, | Performed by: INTERNAL MEDICINE

## 2019-09-10 PROCEDURE — 99214 OFFICE O/P EST MOD 30 MIN: CPT | Mod: S$GLB,,, | Performed by: INTERNAL MEDICINE

## 2019-09-10 PROCEDURE — 3077F SYST BP >= 140 MM HG: CPT | Mod: CPTII,S$GLB,, | Performed by: INTERNAL MEDICINE

## 2019-09-10 PROCEDURE — 36415 COLL VENOUS BLD VENIPUNCTURE: CPT

## 2019-09-10 PROCEDURE — 3008F PR BODY MASS INDEX (BMI) DOCUMENTED: ICD-10-PCS | Mod: CPTII,S$GLB,, | Performed by: INTERNAL MEDICINE

## 2019-09-10 NOTE — PROGRESS NOTES
Subjective:       Patient ID: Ladonna Hunter is a 53 y.o. female.    Chief Complaint: Leg Swelling (both)      HPI:  Patient is new to me but known to clinic and presents with leg swelling. She had a work up around 2/2019 with negative LE US for DVT. TTE was normal. No significant SOB, WEEKS or PND/orthopnea. She is prescribed torsemide but looks like last filled June 2019 for #30 pills and she still has pills--when questioned states he wasn't taking this medication but just restarted. Her BP is elevated today but she is not sure what she is taking--mentions telmisartan but not on her current list; current list has losartan and irbesartan listed. Unsure if taking any HCTZ.     She also reports bowel incontinence. Feels like she can't hold it in and has accidents. No diarrhea. No abd pain, cramping. No n/v. Unsure if any particular foods make her sx worse. No BRBPR. Never had screening C-scope. No significant back pain, numbness tingling or saddle anesthesia.   Past Medical History:   Diagnosis Date    Anemia     Diabetes mellitus, type 2     Hypertension        Family History   Problem Relation Age of Onset    Diverticulitis Mother        Social History     Socioeconomic History    Marital status: Single     Spouse name: Not on file    Number of children: Not on file    Years of education: Not on file    Highest education level: Not on file   Occupational History    Not on file   Social Needs    Financial resource strain: Not on file    Food insecurity:     Worry: Not on file     Inability: Not on file    Transportation needs:     Medical: Not on file     Non-medical: Not on file   Tobacco Use    Smoking status: Never Smoker    Smokeless tobacco: Never Used   Substance and Sexual Activity    Alcohol use: Yes    Drug use: No    Sexual activity: Never   Lifestyle    Physical activity:     Days per week: Not on file     Minutes per session: Not on file    Stress: Not on file   Relationships    Social  connections:     Talks on phone: Not on file     Gets together: Not on file     Attends Gnosticism service: Not on file     Active member of club or organization: Not on file     Attends meetings of clubs or organizations: Not on file     Relationship status: Not on file   Other Topics Concern    Not on file   Social History Narrative    Not on file       Review of Systems   Constitutional: Negative for activity change, fatigue, fever and unexpected weight change.   HENT: Negative for congestion, ear pain, hearing loss, rhinorrhea and sore throat.    Eyes: Negative for pain, redness and visual disturbance.   Respiratory: Negative for cough, shortness of breath and wheezing.    Cardiovascular: Positive for leg swelling. Negative for chest pain and palpitations.   Gastrointestinal: Negative for abdominal pain, constipation, diarrhea, nausea and vomiting.        Bowel incontinence   Genitourinary: Negative for dysuria, frequency, pelvic pain and urgency.   Musculoskeletal: Negative for back pain, joint swelling and neck pain.   Skin: Negative for color change, rash and wound.   Neurological: Negative for dizziness, tremors, weakness, light-headedness and headaches.         Objective:      Physical Exam   Constitutional: She is oriented to person, place, and time. She appears well-developed and well-nourished. No distress.   HENT:   Head: Normocephalic and atraumatic.   Right Ear: External ear normal.   Left Ear: External ear normal.   Eyes: Pupils are equal, round, and reactive to light. Conjunctivae and EOM are normal. Right eye exhibits no discharge. Left eye exhibits no discharge.   Neck: Neck supple. No tracheal deviation present.   Cardiovascular: Normal rate and regular rhythm.   No murmur heard.  Pulmonary/Chest: Effort normal and breath sounds normal. No respiratory distress. She has no wheezes. She has no rales.   Abdominal: Soft. Bowel sounds are normal. She exhibits no distension. There is no tenderness.    Neurological: She is alert and oriented to person, place, and time. No cranial nerve deficit.   Skin: Skin is warm and dry.   Psychiatric: She has a normal mood and affect. Her behavior is normal.   Nursing note and vitals reviewed.      Assessment:       1. Bilateral leg edema    2. Colon cancer screening    3. Incontinence of feces, unspecified fecal incontinence type        Plan:       Ladonna was seen today for leg swelling.    Diagnoses and all orders for this visit:    Bilateral leg edema  -     Basic metabolic panel; Future  Sounds like medication compliance is a big issue here  Resume diuretic  Check labs to ensure normal renal fxn/electrolytes; will go today  Low Na diet  Compression stockings  Leg elevation  TTE normal with EF 65% and normal diastolic function and LE US negative for DVT    Colon cancer screening  -     Case request GI: COLONOSCOPY    Incontinence of feces, unspecified fecal incontinence type  -     Case request GI: COLONOSCOPY  New problem  Keep food diary  No signs of cauda equina on h/o or exam  Due for routine CRC regardless but will start with C-scope. If not diagnostic can consider GI referral and manometry    Follow up PCP as scheduled

## 2019-09-11 ENCOUNTER — TELEPHONE (OUTPATIENT)
Dept: INTERNAL MEDICINE | Facility: CLINIC | Age: 54
End: 2019-09-11

## 2019-09-11 NOTE — TELEPHONE ENCOUNTER
----- Message from Vero Geiger MA sent at 9/11/2019  1:11 PM CDT -----  Contact: Patient  Patient is waiting on an appointment for a colonoscopy.     Please Advise:  [phone]

## 2019-09-13 ENCOUNTER — ANESTHESIA EVENT (OUTPATIENT)
Dept: ENDOSCOPY | Facility: HOSPITAL | Age: 54
End: 2019-09-13
Payer: COMMERCIAL

## 2019-09-13 RX ORDER — SODIUM, POTASSIUM,MAG SULFATES 17.5-3.13G
354 SOLUTION, RECONSTITUTED, ORAL ORAL ONCE
Qty: 354 ML | Refills: 0 | Status: ON HOLD | OUTPATIENT
Start: 2019-09-18 | End: 2019-09-19 | Stop reason: HOSPADM

## 2019-09-13 RX ORDER — BISACODYL 5 MG
20 TABLET, DELAYED RELEASE (ENTERIC COATED) ORAL ONCE
Qty: 4 TABLET | Refills: 0 | Status: ON HOLD | OUTPATIENT
Start: 2019-09-18 | End: 2019-09-19 | Stop reason: HOSPADM

## 2019-09-16 ENCOUNTER — HOSPITAL ENCOUNTER (OUTPATIENT)
Dept: PREADMISSION TESTING | Facility: HOSPITAL | Age: 54
Discharge: HOME | End: 2019-09-16
Attending: COLON & RECTAL SURGERY
Payer: COMMERCIAL

## 2019-09-16 NOTE — DISCHARGE INSTRUCTIONS
Colonoscopy Outpatient procedure instructions    Prep Review  Follow instructions as written on Dr. Mendez's Colonoscopy Prep Sheet  Dr. Mendez patients have nothing to eat or drink after morning prep is complete      Take medications as instructed by your doctor.    Wear something comfortable that is easy for you to take off and put on.   Do not wear any makeup, jewelry, or body piercings. Leave valuables at home or let your family member keep them for you. Do not bring them to the Surgery area.     Date/Day of Procedure: Thursday 9/19/19    Arrival time: Someone will call you the workday day before the procedure  to give you an arrival time   If asked to report to the hospital before 7:00 am report to the Emergency Room.  If asked to report to the hospital at 7:00 a.m. or later report to Patient Registration  It is not necessary to report earlier than the time you are told.   Ignore any automated/computer generated calls telling to what time to report to the hospital.   Plan to be at the hospital for about 4 hours, however, it could be longer.       Diabetics  If you are diabetic do not take your diabetes medication the morning of the procedure unless otherwise instructed by your doctor.  It is important to monitor your blood sugar levels the day you are doing your prep to make sure your blood sugar levels are maintained.

## 2019-09-16 NOTE — ANESTHESIA PREPROCEDURE EVALUATION
09/16/2019  Ladonna Hunter is a 53 y.o., female.    Anesthesia Evaluation    I have reviewed the Patient Summary Reports.    I have reviewed the Nursing Notes.   I have reviewed the Medications.     Review of Systems  Anesthesia Hx:  No problems with previous Anesthesia  History of prior surgery of interest to airway management or planning:  Denies Personal Hx of Anesthesia complications.   Social:  Non-Smoker, No Alcohol Use    Hematology/Oncology:  Hematology Normal   Oncology Normal     EENT/Dental:EENT/Dental Normal   Cardiovascular:   Exercise tolerance: good Hypertension, well controlled ECG has been reviewed. · Normal left ventricular systolic function. The estimated ejection fraction is 65%  · Concentric left ventricular remodeling.  · No wall motion abnormalities.  · Normal LV diastolic function.  · Mild left atrial enlargement.  · Normal right ventricular systolic function.  · Normal central venous pressure (3 mm Hg).    ECG NSR   Pulmonary:  Pulmonary Normal    Renal/:   Chronic Renal Disease, CRI    Hepatic/GI:  Hepatic/GI Normal    Musculoskeletal:  Musculoskeletal Normal    Neurological:  Neurology Normal    Endocrine:   Diabetes, poorly controlled, type 2, using insulin Hypothyroidism Patient A1C >14 with many very high sugars.   Dermatological:  Skin Normal    Psych:  Psychiatric Normal           Physical Exam  General:  Obesity    Airway/Jaw/Neck:  Airway Findings: Mouth Opening: Normal Tongue: Normal  General Airway Assessment: Adult  Mallampati: II  TM Distance: Normal, at least 6 cm  Jaw/Neck Findings:  Neck ROM: Normal ROM      Dental:  Dental Findings: In tact        Mental Status:  Mental Status Findings:  Alert and Oriented, Cooperative         Anesthesia Plan  Type of Anesthesia, risks & benefits discussed:  Anesthesia Type:  general  Patient's Preference:   Intra-op Monitoring  Plan: standard ASA monitors  Intra-op Monitoring Plan Comments:   Post Op Pain Control Plan:   Post Op Pain Control Plan Comments:   Induction:   IV  Beta Blocker:  Patient is not currently on a Beta-Blocker (No further documentation required).       Informed Consent: Patient understands risks and agrees with Anesthesia plan.  Questions answered. Anesthesia consent signed with patient.  ASA Score: 3     Day of Surgery Review of History & Physical: I have interviewed and examined the patient. I have reviewed the patient's H&P dated: 9/19/19. There are no significant changes.  H&P update referred to the surgeon.         Ready For Surgery From Anesthesia Perspective.

## 2019-09-19 ENCOUNTER — ANESTHESIA (OUTPATIENT)
Dept: ENDOSCOPY | Facility: HOSPITAL | Age: 54
End: 2019-09-19
Payer: COMMERCIAL

## 2019-09-19 ENCOUNTER — HOSPITAL ENCOUNTER (OUTPATIENT)
Facility: HOSPITAL | Age: 54
Discharge: HOME | End: 2019-09-19
Attending: COLON & RECTAL SURGERY | Admitting: COLON & RECTAL SURGERY
Payer: COMMERCIAL

## 2019-09-19 VITALS
OXYGEN SATURATION: 100 % | TEMPERATURE: 97 F | SYSTOLIC BLOOD PRESSURE: 159 MMHG | DIASTOLIC BLOOD PRESSURE: 68 MMHG | RESPIRATION RATE: 18 BRPM | HEART RATE: 80 BPM

## 2019-09-19 DIAGNOSIS — R15.9 INCONTINENCE OF FECES, UNSPECIFIED FECAL INCONTINENCE TYPE: Primary | ICD-10-CM

## 2019-09-19 DIAGNOSIS — Z12.11 COLON CANCER SCREENING: ICD-10-CM

## 2019-09-19 PROCEDURE — 37000009 HC ANESTHESIA EA ADD 15 MINS: Performed by: COLON & RECTAL SURGERY

## 2019-09-19 PROCEDURE — 63600175 PHARM REV CODE 636 W HCPCS: Performed by: COLON & RECTAL SURGERY

## 2019-09-19 PROCEDURE — 45380 COLONOSCOPY AND BIOPSY: CPT | Mod: 33,,, | Performed by: COLON & RECTAL SURGERY

## 2019-09-19 PROCEDURE — 88305 TISSUE EXAM BY PATHOLOGIST: CPT | Performed by: PATHOLOGY

## 2019-09-19 PROCEDURE — 37000008 HC ANESTHESIA 1ST 15 MINUTES: Performed by: COLON & RECTAL SURGERY

## 2019-09-19 PROCEDURE — 63600175 PHARM REV CODE 636 W HCPCS: Performed by: NURSE ANESTHETIST, CERTIFIED REGISTERED

## 2019-09-19 PROCEDURE — 88305 TISSUE SPECIMEN TO PATHOLOGY - SURGERY: ICD-10-PCS | Mod: 26,,, | Performed by: PATHOLOGY

## 2019-09-19 PROCEDURE — 45380 PR COLONOSCOPY,BIOPSY: ICD-10-PCS | Mod: 33,,, | Performed by: COLON & RECTAL SURGERY

## 2019-09-19 PROCEDURE — 27201012 HC FORCEPS, HOT/COLD, DISP: Performed by: COLON & RECTAL SURGERY

## 2019-09-19 PROCEDURE — 00811 ANES LWR INTST NDSC NOS: CPT | Mod: QZ,33 | Performed by: NURSE ANESTHETIST, CERTIFIED REGISTERED

## 2019-09-19 PROCEDURE — 88305 TISSUE EXAM BY PATHOLOGIST: CPT | Mod: 26,,, | Performed by: PATHOLOGY

## 2019-09-19 PROCEDURE — 45380 COLONOSCOPY AND BIOPSY: CPT | Performed by: COLON & RECTAL SURGERY

## 2019-09-19 PROCEDURE — 25000003 PHARM REV CODE 250: Performed by: NURSE ANESTHETIST, CERTIFIED REGISTERED

## 2019-09-19 RX ORDER — PROPOFOL 10 MG/ML
INJECTION, EMULSION INTRAVENOUS
Status: DISCONTINUED | OUTPATIENT
Start: 2019-09-19 | End: 2019-09-19

## 2019-09-19 RX ORDER — LIDOCAINE HCL/PF 100 MG/5ML
SYRINGE (ML) INTRAVENOUS
Status: DISCONTINUED | OUTPATIENT
Start: 2019-09-19 | End: 2019-09-19

## 2019-09-19 RX ORDER — LABETALOL HYDROCHLORIDE 5 MG/ML
INJECTION, SOLUTION INTRAVENOUS
Status: DISCONTINUED | OUTPATIENT
Start: 2019-09-19 | End: 2019-09-19

## 2019-09-19 RX ORDER — SODIUM CHLORIDE, SODIUM LACTATE, POTASSIUM CHLORIDE, CALCIUM CHLORIDE 600; 310; 30; 20 MG/100ML; MG/100ML; MG/100ML; MG/100ML
INJECTION, SOLUTION INTRAVENOUS CONTINUOUS
Status: DISCONTINUED | OUTPATIENT
Start: 2019-09-19 | End: 2019-09-19 | Stop reason: HOSPADM

## 2019-09-19 RX ORDER — HYDRALAZINE HYDROCHLORIDE 20 MG/ML
INJECTION INTRAMUSCULAR; INTRAVENOUS
Status: DISCONTINUED | OUTPATIENT
Start: 2019-09-19 | End: 2019-09-19

## 2019-09-19 RX ORDER — PROPOFOL 10 MG/ML
INJECTION, EMULSION INTRAVENOUS CONTINUOUS PRN
Status: DISCONTINUED | OUTPATIENT
Start: 2019-09-19 | End: 2019-09-19

## 2019-09-19 RX ADMIN — SODIUM CHLORIDE, SODIUM LACTATE, POTASSIUM CHLORIDE, AND CALCIUM CHLORIDE: .6; .31; .03; .02 INJECTION, SOLUTION INTRAVENOUS at 09:09

## 2019-09-19 RX ADMIN — PROPOFOL 150 MCG/KG/MIN: 10 INJECTION, EMULSION INTRAVENOUS at 09:09

## 2019-09-19 RX ADMIN — HYDRALAZINE HYDROCHLORIDE 5 MG: 20 INJECTION INTRAMUSCULAR; INTRAVENOUS at 08:09

## 2019-09-19 RX ADMIN — LABETALOL HYDROCHLORIDE 20 MG: 5 INJECTION, SOLUTION INTRAVENOUS at 08:09

## 2019-09-19 RX ADMIN — LIDOCAINE HYDROCHLORIDE 50 MG: 20 INJECTION, SOLUTION INTRAVENOUS at 09:09

## 2019-09-19 RX ADMIN — PROPOFOL 80 MG: 10 INJECTION, EMULSION INTRAVENOUS at 09:09

## 2019-09-19 RX ADMIN — SODIUM CHLORIDE, SODIUM LACTATE, POTASSIUM CHLORIDE, AND CALCIUM CHLORIDE: .6; .31; .03; .02 INJECTION, SOLUTION INTRAVENOUS at 07:09

## 2019-09-19 NOTE — ANESTHESIA POSTPROCEDURE EVALUATION
Anesthesia Post Evaluation    Patient: Ladonna Hunter    Procedure(s) Performed: Procedure(s) (LRB):  COLONOSCOPY (N/A)    Final Anesthesia Type: general  Patient location during evaluation: PACU  Patient participation: Yes- Able to Participate  Level of consciousness: awake and alert and oriented  Post-procedure vital signs: reviewed and stable  Pain management: adequate  Airway patency: patent  PONV status at discharge: No PONV  Anesthetic complications: no      Cardiovascular status: blood pressure returned to baseline and hemodynamically stable  Respiratory status: unassisted, spontaneous ventilation and room air  Hydration status: euvolemic  Follow-up not needed.          Vitals Value Taken Time   /68 9/19/2019  9:50 AM   Temp 36.1 °C (97 °F) 9/19/2019  9:28 AM   Pulse 80 9/19/2019  9:50 AM   Resp 18 9/19/2019  9:50 AM   SpO2 100 % 9/19/2019  9:50 AM         Event Time     Out of Recovery 09:58:52          Pain/Mariya Score: Mariya Score: 10 (9/19/2019  9:50 AM)

## 2019-09-19 NOTE — H&P
Procedure : Colonoscopy    Indication(s):  asymptomatic screening exam    Review of patient's allergies indicates:  No Known Allergies    Past Medical History:   Diagnosis Date    Anemia     Diabetes mellitus, type 2     Hypertension     Thyroid disease        Prior to Admission medications    Medication Sig Start Date End Date Taking? Authorizing Provider   bisacodyl (DULCOLAX) 5 mg EC tablet Take 4 tablets (20 mg total) by mouth once. Per Dr. Mendez instruction sheet for 1 dose 9/18/19 9/18/19  Yoseph Mendez MD   ergocalciferol (ERGOCALCIFEROL) 50,000 unit Cap Take 1 capsule (50,000 Units total) by mouth every 7 days. 6/27/19   Mayra Islas NP   fenofibrate micronized (LOFIBRA) 134 MG Cap Take 1 capsule (134 mg total) by mouth daily with breakfast. 6/12/19 6/11/20  Mayra Islas NP   flash glucose scanning reader (FREESTYLE DARIUS 14 DAY READER) Misc Blood sugar readings AC and HS 6/27/19   Mayra Islas NP   flash glucose sensor (FREESTYLE DARIUS 14 DAY SENSOR) Kit Blood sugars AC and HS to slide 6/27/19   Mayra Islas NP   glimepiride (AMARYL) 4 MG tablet Take 1 tablet (4 mg total) by mouth daily with breakfast. 6/12/19 6/11/20  Mayra Islas NP   insulin (LANTUS SOLOSTAR U-100 INSULIN) glargine 100 units/mL (3mL) SubQ pen Inject 20 Units into the skin every evening. 6/27/19 6/26/20  Mayra Islas NP   insulin aspart U-100 (NOVOLOG FLEXPEN U-100 INSULIN) 100 unit/mL (3 mL) InPn pen Inject 10 Units into the skin 3 (three) times daily with meals. 6/27/19 6/26/20  Mayra Islas NP   levothyroxine (SYNTHROID) 112 MCG tablet Take 1 tablet (112 mcg total) by mouth before breakfast. 6/12/19 6/11/20  Mayra Islas NP   losartan-hydrochlorothiazide 100-12.5 mg (HYZAAR) 100-12.5 mg Tab Take 1 tablet by mouth once daily. 6/14/19   Mayra Islas NP   metFORMIN (GLUCOPHAGE) 500 MG tablet Take 2 tablets (1,000 mg total) by mouth 2 (two) times daily with meals. 6/12/19  "6/11/20  Mayra Islas NP   oxybutynin (DITROPAN-XL) 10 MG 24 hr tablet Take 1 tablet (10 mg total) by mouth once daily. 6/12/19 6/11/20  Mayra Islas NP   pen needle, diabetic 32 gauge x 1/4" Ndle Use to inject with insulin four times a day 6/27/19   Mayra Islas NP   sodium,potassium,mag sulfates (SUPREP BOWEL PREP KIT) 17.5-3.13-1.6 gram SolR Take 354 mLs by mouth once. Per Dr. Mendez instruction sheet for 1 dose 9/18/19 9/18/19  Yoseph Mendez MD   torsemide (DEMADEX) 20 MG Tab Take 1 tablet (20 mg total) by mouth once daily. 6/12/19 6/11/20  Mayra Islas NP       Sedation Problems: NO    Family History   Problem Relation Age of Onset    Diverticulitis Mother     Diabetes Mother     Heart disease Father        Fam Hx of Sedation Problems: NO    Social History     Socioeconomic History    Marital status: Single     Spouse name: Not on file    Number of children: Not on file    Years of education: Not on file    Highest education level: Not on file   Occupational History    Not on file   Social Needs    Financial resource strain: Not on file    Food insecurity:     Worry: Not on file     Inability: Not on file    Transportation needs:     Medical: Not on file     Non-medical: Not on file   Tobacco Use    Smoking status: Never Smoker    Smokeless tobacco: Never Used   Substance and Sexual Activity    Alcohol use: Yes    Drug use: No    Sexual activity: Never   Lifestyle    Physical activity:     Days per week: Not on file     Minutes per session: Not on file    Stress: Not on file   Relationships    Social connections:     Talks on phone: Not on file     Gets together: Not on file     Attends Congregation service: Not on file     Active member of club or organization: Not on file     Attends meetings of clubs or organizations: Not on file     Relationship status: Not on file   Other Topics Concern    Not on file   Social History Narrative    Not on file       Review of " Systems -     Respiratory ROS: no cough, shortness of breath, or wheezing  Cardiovascular ROS: no chest pain or dyspnea on exertion  Gastrointestinal ROS: no abdominal pain, change in bowel habits, or black or bloody stools  Musculoskeletal ROS: negative  Neurological ROS: no TIA or stroke symptoms        Physical Exam:  General: no distress  Head: normocephalic  Airway:  normal oropharynx, airway normal  Neck: supple, symmetrical, trachea midline  Lungs:  normal respiratory effort  Heart: regular rate and rhythm  Abdomen: soft, non-tender non-distented; bowel sounds normal; no masses,  no organomegaly  Extremities: no cyanosis or edema, or clubbing       Deep Sedation: Mallampati Score per anesthesia     SedationPlan :Moderate     ASA : II    Patient is medically cleared for anesthesia.    Anesthesia/Surgery risks, benefits and alternative options discussed and understood by patient/family.

## 2019-09-19 NOTE — TRANSFER OF CARE
Anesthesia Transfer of Care Note    Patient: Ladonna Hunter    Procedure(s) Performed: Procedure(s) (LRB):  COLONOSCOPY (N/A)    Patient location: PACU    Anesthesia Type: general    Transport from OR: Transported from OR on 6-10 L/min O2 by face mask with adequate spontaneous ventilation    Post pain: adequate analgesia    Post assessment: no apparent anesthetic complications and tolerated procedure well    Post vital signs: stable    Level of consciousness: sedated    Nausea/Vomiting: no nausea/vomiting    Complications: none    Transfer of care protocol was followed      Last vitals:   Visit Vitals  BP (!) 233/95   LMP 04/12/2016   Breastfeeding? No

## 2019-09-19 NOTE — OP NOTE
Patient Name: Ladonna Hunter   Procedure Date: 2019  MRN: [id number]  : 1965  Age: 53 y.o.  Gender: female   Referring Physician :  Mayra Islas NP  Plan for Procedure: Monitored anaesthesia care  Indication: asymptomatic screening exam  Procedure:   Colonoscopy biopsy cold forceps    Surgeon(s) and Role:     * Yoseph Mendez MD - Primary    Complications: None     Medicines: monitored anesthesia care    Procedure:  Prior to the procedure, a History and Physical was performed, and patient medications, allergies and sensitivities were reviewed.  The patient's tolerance of previous anesthesia was reviewed. The risks and benefits of the procedure and the sedation options and risks were discussed with the patient.  All questions were answered and informed consent was obtained.    After I obtained informed consent, the scope was passed under direct vision.  Throughout the procedure, the patient's blood pressure, pulse, and oxygen saturations were monitored continuously.  The Olympus scope CF - [id number] was introduced through the anus and advanced to the cecum, identified by appendiceal orifice and ileocecal valve.  The colonoscopy was performed without difficulty.  The patient tolerated the procedure well.  The quality of the bowel preparation was good     Findings:  polyp(s) #1, 30 mm in size, flat/semi-sessile located in the transverse colon, biopsied  #2, 8 mm in size, located in the transverse colon immediately adjacent to the larger polyp, left undisturbed    EBL: none    Impression:  Large, flat transverse colon polyp, biopsied but not removed.  Adjacent smaller polyp not removed.    Recommendation:  Follow-up pathology, if adenoma but no invasive cancer, she will need a a repeat colonoscopy at [facility] [place] for advanced endoscopic polypectomy.

## 2019-09-19 NOTE — DISCHARGE SUMMARY
Discharge Note  Short Stay      SUMMARY     Admit Date: 9/19/2019    Attending Physician: Yoseph Mendez MD     Discharge Physician: Yoseph Mendez MD    Discharge Date: 9/19/2019 9:40 AM    Admission Diagnosis: asymptomatic screening exam    Final Diagnosis:  Colon polyps    Procedures Performed:    Colonoscopy biopsy cold forceps    Disposition: Home or Self Care    Condition at Discharge:  Stable    Patient Instructions:   Current Discharge Medication List      CONTINUE these medications which have NOT CHANGED    Details   ergocalciferol (ERGOCALCIFEROL) 50,000 unit Cap Take 1 capsule (50,000 Units total) by mouth every 7 days.  Qty: 12 capsule, Refills: 3    Associated Diagnoses: Low vitamin D level      fenofibrate micronized (LOFIBRA) 134 MG Cap Take 1 capsule (134 mg total) by mouth daily with breakfast.  Qty: 30 capsule, Refills: 5    Associated Diagnoses: Type 2 diabetes mellitus without complication, without long-term current use of insulin; Hypertriglyceridemia      flash glucose scanning reader (FREESTYLE DARIUS 14 DAY READER) Misc Blood sugar readings AC and HS  Qty: 1 each, Refills: 6    Associated Diagnoses: Uncontrolled type 2 diabetes mellitus with hyperglycemia      flash glucose sensor (FREESTYLE DARIUS 14 DAY SENSOR) Kit Blood sugars AC and HS to slide  Qty: 1 kit, Refills: 6    Associated Diagnoses: Uncontrolled type 2 diabetes mellitus with hyperglycemia      glimepiride (AMARYL) 4 MG tablet Take 1 tablet (4 mg total) by mouth daily with breakfast.  Qty: 30 tablet, Refills: 5    Associated Diagnoses: Uncontrolled type 2 diabetes mellitus with hyperglycemia      insulin (LANTUS SOLOSTAR U-100 INSULIN) glargine 100 units/mL (3mL) SubQ pen Inject 20 Units into the skin every evening.  Qty: 15 mL, Refills: 1    Comments: okay to switch per JJ  Associated Diagnoses: Uncontrolled type 2 diabetes mellitus with hyperglycemia      insulin aspart U-100 (NOVOLOG FLEXPEN U-100 INSULIN) 100 unit/mL (3 mL)  "InPn pen Inject 10 Units into the skin 3 (three) times daily with meals.  Qty: 15 mL, Refills: 1    Associated Diagnoses: Uncontrolled type 2 diabetes mellitus with hyperglycemia      levothyroxine (SYNTHROID) 112 MCG tablet Take 1 tablet (112 mcg total) by mouth before breakfast.  Qty: 30 tablet, Refills: 5    Associated Diagnoses: Hypothyroidism, unspecified type      losartan-hydrochlorothiazide 100-12.5 mg (HYZAAR) 100-12.5 mg Tab Take 1 tablet by mouth once daily.  Qty: 30 tablet, Refills: 5    Associated Diagnoses: Hypertension, unspecified type      metFORMIN (GLUCOPHAGE) 500 MG tablet Take 2 tablets (1,000 mg total) by mouth 2 (two) times daily with meals.  Qty: 120 tablet, Refills: 5    Associated Diagnoses: Type 2 diabetes mellitus without complication, without long-term current use of insulin      oxybutynin (DITROPAN-XL) 10 MG 24 hr tablet Take 1 tablet (10 mg total) by mouth once daily.  Qty: 30 tablet, Refills: 11    Associated Diagnoses: OAB (overactive bladder)      pen needle, diabetic 32 gauge x 1/4" Ndle Use to inject with insulin four times a day  Qty: 100 each, Refills: 1    Associated Diagnoses: Uncontrolled type 2 diabetes mellitus with hyperglycemia      torsemide (DEMADEX) 20 MG Tab Take 1 tablet (20 mg total) by mouth once daily.  Qty: 30 tablet, Refills: 5    Associated Diagnoses: Pedal edema; Elevated brain natriuretic peptide (BNP) level         STOP taking these medications       bisacodyl (DULCOLAX) 5 mg EC tablet Comments:   Reason for Stopping:         sodium,potassium,mag sulfates (SUPREP BOWEL PREP KIT) 17.5-3.13-1.6 gram SolR Comments:   Reason for Stopping:               Discharge Procedure Orders (must include Diet, Follow-up, Activity)   Discharge Procedure Orders (must include Diet, Follow-up, Activity)   Activity as tolerated        Follow-up pathology, if adenoma but no invasive cancer, she will need a a repeat colonoscopy at [facility] [place] for advanced endoscopic polypectomy.    "

## 2019-09-26 ENCOUNTER — TELEPHONE (OUTPATIENT)
Dept: INTERNAL MEDICINE | Facility: CLINIC | Age: 54
End: 2019-09-26

## 2019-09-26 ENCOUNTER — TELEPHONE (OUTPATIENT)
Dept: SURGERY | Facility: CLINIC | Age: 54
End: 2019-09-26

## 2019-09-26 DIAGNOSIS — D12.3 ADENOMATOUS POLYP OF TRANSVERSE COLON: Primary | ICD-10-CM

## 2019-09-26 NOTE — TELEPHONE ENCOUNTER
Unable to reach patient or leave message. Dr. Mendez's office will notify patient of results once reviewed.

## 2019-09-26 NOTE — TELEPHONE ENCOUNTER
Left message for patient to call Endoscopy to have a repeat colonoscopy due to incomplete removal of polyp.

## 2019-09-26 NOTE — TELEPHONE ENCOUNTER
----- Message from Viviana Christy sent at 9/26/2019  4:20 PM CDT -----  Contact:  self [phone]  Pt is returning Carolina Denny's call    Contact: self [phone]

## 2019-09-26 NOTE — TELEPHONE ENCOUNTER
Spoke with patient.  Informed her that Endoscopy will call to schedule a repeat colonoscopy with Dr. Mendez.

## 2019-09-26 NOTE — TELEPHONE ENCOUNTER
----- Message from Margi Bettencourt sent at 2019 12:04 PM CDT -----  Contact: Self  Ladonna Hunter  MRN: [id number]  : 1965  PCP: Mayra Islas  Home Phone      [phone]  Work Phone      Not on file.  Mobile          [phone]      MESSAGE:   Patient states she had a colonoscopy done and had a biopsy done on a pileup, she would like to get results of the biopsy. Please advise    Phone: [phone]

## 2019-09-26 NOTE — TELEPHONE ENCOUNTER
----- Message from Clarisa Wang sent at 9/26/2019 12:40 PM CDT -----  Contact: pt  Type:  Test Results    Who Called: pt    Name of Test (Lab/Mammo/Etc):colonoscopy  Date of Test: 9/19/19    Ordering Provider: Andrea    Where the test was performed: [facility]    Would the patient rather a call back or a response via [facility]? Call back    Best Call Back Number: [phone]    Additional Information:Pt phone is acting up so please leave a voicemail if she does not answer.

## 2019-09-27 ENCOUNTER — TELEPHONE (OUTPATIENT)
Dept: SURGERY | Facility: CLINIC | Age: 54
End: 2019-09-27

## 2019-09-27 ENCOUNTER — TELEPHONE (OUTPATIENT)
Dept: ENDOSCOPY | Facility: HOSPITAL | Age: 54
End: 2019-09-27

## 2019-09-27 DIAGNOSIS — Z12.11 SPECIAL SCREENING FOR MALIGNANT NEOPLASMS, COLON: Primary | ICD-10-CM

## 2019-09-27 RX ORDER — SODIUM, POTASSIUM,MAG SULFATES 17.5-3.13G
SOLUTION, RECONSTITUTED, ORAL ORAL
Qty: 354 ML | Refills: 0 | Status: ON HOLD | OUTPATIENT
Start: 2019-09-27 | End: 2019-10-29 | Stop reason: HOSPADM

## 2019-09-27 NOTE — TELEPHONE ENCOUNTER
Spoke with patient.  She would like to know if results were back from her biopsy.  Informed her that it showed high and low grade dysplagia.  The repeat colonoscopy is being done to completely remove the polyp in question.

## 2019-09-27 NOTE — TELEPHONE ENCOUNTER
----- Message from Fallon Montenegro sent at 9/27/2019  1:43 PM CDT -----  Contact: pt   Type:  Test Results    Who Called:  Pt   Name of Test (Lab/Mammo/Etc):  colonoscopy biopsy results   Date of Test:  09/12/2019  Ordering Provider:  Dr Mendez   Where the test was performed:  [facility]   Best Call Back Number:   Can you please call pt at [phone]  Additional Information:  None    DIANE

## 2019-10-14 ENCOUNTER — OFFICE VISIT (OUTPATIENT)
Dept: FAMILY MEDICINE | Facility: CLINIC | Age: 54
End: 2019-10-14
Payer: COMMERCIAL

## 2019-10-14 VITALS
HEIGHT: 65 IN | BODY MASS INDEX: 34.12 KG/M2 | SYSTOLIC BLOOD PRESSURE: 190 MMHG | HEART RATE: 58 BPM | DIASTOLIC BLOOD PRESSURE: 84 MMHG | WEIGHT: 204.81 LBS | RESPIRATION RATE: 16 BRPM

## 2019-10-14 DIAGNOSIS — B37.31 CANDIDA VAGINITIS: ICD-10-CM

## 2019-10-14 DIAGNOSIS — D64.9 ANEMIA, UNSPECIFIED TYPE: ICD-10-CM

## 2019-10-14 DIAGNOSIS — E78.1 HYPERTRIGLYCERIDEMIA: ICD-10-CM

## 2019-10-14 DIAGNOSIS — R60.0 PEDAL EDEMA: ICD-10-CM

## 2019-10-14 DIAGNOSIS — E03.9 HYPOTHYROIDISM, UNSPECIFIED TYPE: ICD-10-CM

## 2019-10-14 DIAGNOSIS — I10 HYPERTENSION, UNSPECIFIED TYPE: ICD-10-CM

## 2019-10-14 DIAGNOSIS — E11.9 TYPE 2 DIABETES MELLITUS WITHOUT COMPLICATION, WITHOUT LONG-TERM CURRENT USE OF INSULIN: ICD-10-CM

## 2019-10-14 DIAGNOSIS — R19.7 DIARRHEA, UNSPECIFIED TYPE: Primary | ICD-10-CM

## 2019-10-14 DIAGNOSIS — F32.A DEPRESSION, UNSPECIFIED DEPRESSION TYPE: ICD-10-CM

## 2019-10-14 LAB
ALBUMIN SERPL BCP-MCNC: 2 G/DL (ref 3.5–5.2)
ALP SERPL-CCNC: 81 U/L (ref 55–135)
ALT SERPL W/O P-5'-P-CCNC: 7 U/L (ref 10–44)
ANION GAP SERPL CALC-SCNC: 10 MMOL/L (ref 8–16)
AST SERPL-CCNC: 10 U/L (ref 10–40)
BASOPHILS # BLD AUTO: 0.04 K/UL (ref 0–0.2)
BASOPHILS NFR BLD: 0.5 % (ref 0–1.9)
BILIRUB SERPL-MCNC: 0.3 MG/DL (ref 0.1–1)
BUN SERPL-MCNC: 15 MG/DL (ref 6–20)
CALCIUM SERPL-MCNC: 8.7 MG/DL (ref 8.7–10.5)
CHLORIDE SERPL-SCNC: 103 MMOL/L (ref 95–110)
CHOLEST SERPL-MCNC: 215 MG/DL (ref 120–199)
CHOLEST/HDLC SERPL: 4.7 {RATIO} (ref 2–5)
CO2 SERPL-SCNC: 25 MMOL/L (ref 23–29)
CREAT SERPL-MCNC: 1.3 MG/DL (ref 0.5–1.4)
DIFFERENTIAL METHOD: ABNORMAL
EOSINOPHIL # BLD AUTO: 0.2 K/UL (ref 0–0.5)
EOSINOPHIL NFR BLD: 2 % (ref 0–8)
ERYTHROCYTE [DISTWIDTH] IN BLOOD BY AUTOMATED COUNT: 15.7 % (ref 11.5–14.5)
EST. GFR  (AFRICAN AMERICAN): 54 ML/MIN/1.73 M^2
EST. GFR  (NON AFRICAN AMERICAN): 47 ML/MIN/1.73 M^2
GLUCOSE SERPL-MCNC: 387 MG/DL (ref 70–110)
HCT VFR BLD AUTO: 37 % (ref 37–48.5)
HDLC SERPL-MCNC: 46 MG/DL (ref 40–75)
HDLC SERPL: 21.4 % (ref 20–50)
HGB BLD-MCNC: 11 G/DL (ref 12–16)
IMM GRANULOCYTES # BLD AUTO: 0.03 K/UL (ref 0–0.04)
IMM GRANULOCYTES NFR BLD AUTO: 0.4 % (ref 0–0.5)
LDLC SERPL CALC-MCNC: 131 MG/DL (ref 63–159)
LYMPHOCYTES # BLD AUTO: 2.3 K/UL (ref 1–4.8)
LYMPHOCYTES NFR BLD: 29.2 % (ref 18–48)
MCH RBC QN AUTO: 23.4 PG (ref 27–31)
MCHC RBC AUTO-ENTMCNC: 29.7 G/DL (ref 32–36)
MCV RBC AUTO: 79 FL (ref 82–98)
MONOCYTES # BLD AUTO: 0.6 K/UL (ref 0.3–1)
MONOCYTES NFR BLD: 7.2 % (ref 4–15)
NEUTROPHILS # BLD AUTO: 4.7 K/UL (ref 1.8–7.7)
NEUTROPHILS NFR BLD: 60.7 % (ref 38–73)
NONHDLC SERPL-MCNC: 169 MG/DL
NRBC BLD-RTO: 0 /100 WBC
PLATELET # BLD AUTO: 258 K/UL (ref 150–350)
PMV BLD AUTO: 10.5 FL (ref 9.2–12.9)
POTASSIUM SERPL-SCNC: 3.6 MMOL/L (ref 3.5–5.1)
PROT SERPL-MCNC: 6.4 G/DL (ref 6–8.4)
RBC # BLD AUTO: 4.71 M/UL (ref 4–5.4)
SODIUM SERPL-SCNC: 138 MMOL/L (ref 136–145)
T4 FREE SERPL-MCNC: 0.92 NG/DL (ref 0.71–1.51)
TRIGL SERPL-MCNC: 190 MG/DL (ref 30–150)
TSH SERPL DL<=0.005 MIU/L-ACNC: 5.9 UIU/ML (ref 0.4–4)
WBC # BLD AUTO: 7.81 K/UL (ref 3.9–12.7)

## 2019-10-14 PROCEDURE — 99999 PR PBB SHADOW E&M-EST. PATIENT-LVL IV: CPT | Mod: PBBFAC,,, | Performed by: NURSE PRACTITIONER

## 2019-10-14 PROCEDURE — 3079F PR MOST RECENT DIASTOLIC BLOOD PRESSURE 80-89 MM HG: ICD-10-PCS | Mod: CPTII,S$GLB,, | Performed by: NURSE PRACTITIONER

## 2019-10-14 PROCEDURE — 3008F PR BODY MASS INDEX (BMI) DOCUMENTED: ICD-10-PCS | Mod: CPTII,S$GLB,, | Performed by: NURSE PRACTITIONER

## 2019-10-14 PROCEDURE — 84443 ASSAY THYROID STIM HORMONE: CPT

## 2019-10-14 PROCEDURE — 84481 FREE ASSAY (FT-3): CPT

## 2019-10-14 PROCEDURE — 85025 COMPLETE CBC W/AUTO DIFF WBC: CPT

## 2019-10-14 PROCEDURE — 3079F DIAST BP 80-89 MM HG: CPT | Mod: CPTII,S$GLB,, | Performed by: NURSE PRACTITIONER

## 2019-10-14 PROCEDURE — 3077F PR MOST RECENT SYSTOLIC BLOOD PRESSURE >= 140 MM HG: ICD-10-PCS | Mod: CPTII,S$GLB,, | Performed by: NURSE PRACTITIONER

## 2019-10-14 PROCEDURE — 80061 LIPID PANEL: CPT

## 2019-10-14 PROCEDURE — 99214 PR OFFICE/OUTPT VISIT, EST, LEVL IV, 30-39 MIN: ICD-10-PCS | Mod: S$GLB,,, | Performed by: NURSE PRACTITIONER

## 2019-10-14 PROCEDURE — 3077F SYST BP >= 140 MM HG: CPT | Mod: CPTII,S$GLB,, | Performed by: NURSE PRACTITIONER

## 2019-10-14 PROCEDURE — 36415 COLL VENOUS BLD VENIPUNCTURE: CPT | Mod: S$GLB,,, | Performed by: FAMILY MEDICINE

## 2019-10-14 PROCEDURE — 3008F BODY MASS INDEX DOCD: CPT | Mod: CPTII,S$GLB,, | Performed by: NURSE PRACTITIONER

## 2019-10-14 PROCEDURE — 80053 COMPREHEN METABOLIC PANEL: CPT

## 2019-10-14 PROCEDURE — 84439 ASSAY OF FREE THYROXINE: CPT

## 2019-10-14 PROCEDURE — 99214 OFFICE O/P EST MOD 30 MIN: CPT | Mod: S$GLB,,, | Performed by: NURSE PRACTITIONER

## 2019-10-14 PROCEDURE — 36415 PR COLLECTION VENOUS BLOOD,VENIPUNCTURE: ICD-10-PCS | Mod: S$GLB,,, | Performed by: FAMILY MEDICINE

## 2019-10-14 PROCEDURE — 83036 HEMOGLOBIN GLYCOSYLATED A1C: CPT

## 2019-10-14 PROCEDURE — 99999 PR PBB SHADOW E&M-EST. PATIENT-LVL IV: ICD-10-PCS | Mod: PBBFAC,,, | Performed by: NURSE PRACTITIONER

## 2019-10-14 RX ORDER — ESCITALOPRAM OXALATE 10 MG/1
10 TABLET ORAL DAILY
Qty: 30 TABLET | Refills: 2 | Status: SHIPPED | OUTPATIENT
Start: 2019-10-14 | End: 2021-01-11 | Stop reason: SDUPTHER

## 2019-10-14 RX ORDER — FLUCONAZOLE 150 MG/1
TABLET ORAL
Qty: 2 TABLET | Refills: 3 | Status: SHIPPED | OUTPATIENT
Start: 2019-10-14 | End: 2019-11-04

## 2019-10-14 RX ORDER — FENOFIBRATE 134 MG/1
134 CAPSULE ORAL
Qty: 30 CAPSULE | Refills: 5 | Status: SHIPPED | OUTPATIENT
Start: 2019-10-14 | End: 2021-01-11

## 2019-10-14 RX ORDER — MONTELUKAST SODIUM 4 MG/1
1-4 TABLET, CHEWABLE ORAL DAILY PRN
Qty: 240 TABLET | Refills: 3 | Status: SHIPPED | OUTPATIENT
Start: 2019-10-14 | End: 2021-01-11

## 2019-10-14 RX ORDER — NYSTATIN 100000 U/G
CREAM TOPICAL 3 TIMES DAILY
Qty: 30 G | Refills: 2 | Status: SHIPPED | OUTPATIENT
Start: 2019-10-14 | End: 2019-11-04

## 2019-10-14 NOTE — PROGRESS NOTES
Subjective:       Patient ID: Ladonna Hunter is a 54 y.o. female.    Chief Complaint: Follow-up (BP follow up)    Here with chronic diarrhea. Has had some bowel movements on herself. Has had colonoscopy last week. Symptoms have gotten worse over time. No energy and depression. Off blood pressure meds for a couple of weeks.    Review of Systems   Constitutional: Positive for appetite change and fatigue. Negative for fever.   HENT: Negative.  Negative for congestion, ear pain and sore throat.    Eyes: Negative.  Negative for visual disturbance.   Respiratory: Negative.  Negative for cough, shortness of breath and wheezing.    Cardiovascular: Negative.    Gastrointestinal: Positive for diarrhea. Negative for abdominal pain, nausea and vomiting. Constipation: very occasionally.        Urgency of the bowels with some incontinence daily - leaking from the rectum   Endocrine: Negative.         Blood sugars running in the low 200's   Genitourinary: Negative.  Negative for difficulty urinating and urgency.   Musculoskeletal: Negative.  Negative for arthralgias and myalgias.   Skin: Negative.  Negative for color change.   Allergic/Immunologic: Negative.    Neurological: Negative.  Negative for dizziness and headaches.   Hematological: Negative.    Psychiatric/Behavioral: Positive for dysphoric mood. Negative for sleep disturbance. The patient is nervous/anxious.    All other systems reviewed and are negative.      Objective:      Physical Exam   Constitutional: She is oriented to person, place, and time. She appears well-developed and well-nourished. No distress.   HENT:   Head: Normocephalic and atraumatic.   Eyes: Pupils are equal, round, and reactive to light.   Conjunctiva pale   Neck: Normal range of motion. Neck supple.   Cardiovascular: Normal rate, regular rhythm and normal heart sounds.   No murmur heard.  Pulmonary/Chest: Effort normal and breath sounds normal. No respiratory distress.   Musculoskeletal: Normal  range of motion. She exhibits edema (2 + pitting edema at the feet).   Neurological: She is alert and oriented to person, place, and time.   Skin: Skin is warm and dry. There is pallor.   Psychiatric: She has a normal mood and affect.   Nursing note and vitals reviewed.      Assessment:       1. Diarrhea, unspecified type    2. Type 2 diabetes mellitus without complication, without long-term current use of insulin    3. Hypertriglyceridemia    4. Hypothyroidism, unspecified type    5. Hypertension, unspecified type    6. Pedal edema    7. Anemia, unspecified type    8. Candida vaginitis    9. Depression, unspecified depression type        Plan:   Ladonna was seen today for follow-up.    Diagnoses and all orders for this visit:    Diarrhea, unspecified type  -     colestipol (COLESTID) 1 gram Tab; Take 1-4 tablets (1-4 g total) by mouth daily as needed (diarrhea).    Type 2 diabetes mellitus without complication, without long-term current use of insulin  -     fenofibrate micronized (LOFIBRA) 134 MG Cap; Take 1 capsule (134 mg total) by mouth daily with breakfast.  -     Comprehensive metabolic panel  -     Hemoglobin A1c    Hypertriglyceridemia  -     fenofibrate micronized (LOFIBRA) 134 MG Cap; Take 1 capsule (134 mg total) by mouth daily with breakfast.  -     Lipid panel    Hypothyroidism, unspecified type  -     TSH  -     T4, free  -     T3, free    Hypertension, unspecified type  -     Comprehensive metabolic panel    Pedal edema  -     Comprehensive metabolic panel    Anemia, unspecified type  -     CBC auto differential    Candida vaginitis  -     fluconazole (DIFLUCAN) 150 MG Tab; 1 today and repeat in 1 week if necessary  -     nystatin (MYCOSTATIN) cream; Apply topically 3 (three) times daily. To vaginal area    Depression, unspecified depression type  -     escitalopram oxalate (LEXAPRO) 10 MG tablet; Take 1 tablet (10 mg total) by mouth once daily.    RTC Friday for recheck

## 2019-10-15 LAB
ESTIMATED AVG GLUCOSE: 329 MG/DL (ref 68–131)
HBA1C MFR BLD HPLC: 13.1 % (ref 4–5.6)
T3FREE SERPL-MCNC: 1.6 PG/ML (ref 2.3–4.2)

## 2019-10-18 ENCOUNTER — OFFICE VISIT (OUTPATIENT)
Dept: FAMILY MEDICINE | Facility: CLINIC | Age: 54
End: 2019-10-18
Payer: COMMERCIAL

## 2019-10-18 VITALS
DIASTOLIC BLOOD PRESSURE: 82 MMHG | BODY MASS INDEX: 34.99 KG/M2 | WEIGHT: 210 LBS | SYSTOLIC BLOOD PRESSURE: 164 MMHG | HEIGHT: 65 IN | HEART RATE: 60 BPM | RESPIRATION RATE: 18 BRPM

## 2019-10-18 DIAGNOSIS — E03.9 HYPOTHYROIDISM, UNSPECIFIED TYPE: ICD-10-CM

## 2019-10-18 DIAGNOSIS — E11.65 UNCONTROLLED TYPE 2 DIABETES MELLITUS WITH HYPERGLYCEMIA: ICD-10-CM

## 2019-10-18 DIAGNOSIS — R29.898 WEAKNESS OF BOTH LOWER EXTREMITIES: Primary | ICD-10-CM

## 2019-10-18 PROCEDURE — 3079F DIAST BP 80-89 MM HG: CPT | Mod: CPTII,S$GLB,, | Performed by: NURSE PRACTITIONER

## 2019-10-18 PROCEDURE — 99213 PR OFFICE/OUTPT VISIT, EST, LEVL III, 20-29 MIN: ICD-10-PCS | Mod: S$GLB,,, | Performed by: NURSE PRACTITIONER

## 2019-10-18 PROCEDURE — 99999 PR PBB SHADOW E&M-EST. PATIENT-LVL V: CPT | Mod: PBBFAC,,, | Performed by: NURSE PRACTITIONER

## 2019-10-18 PROCEDURE — 3046F PR MOST RECENT HEMOGLOBIN A1C LEVEL > 9.0%: ICD-10-PCS | Mod: CPTII,S$GLB,, | Performed by: NURSE PRACTITIONER

## 2019-10-18 PROCEDURE — 99999 PR PBB SHADOW E&M-EST. PATIENT-LVL V: ICD-10-PCS | Mod: PBBFAC,,, | Performed by: NURSE PRACTITIONER

## 2019-10-18 PROCEDURE — 99213 OFFICE O/P EST LOW 20 MIN: CPT | Mod: S$GLB,,, | Performed by: NURSE PRACTITIONER

## 2019-10-18 PROCEDURE — 3046F HEMOGLOBIN A1C LEVEL >9.0%: CPT | Mod: CPTII,S$GLB,, | Performed by: NURSE PRACTITIONER

## 2019-10-18 PROCEDURE — 3008F BODY MASS INDEX DOCD: CPT | Mod: CPTII,S$GLB,, | Performed by: NURSE PRACTITIONER

## 2019-10-18 PROCEDURE — 3077F SYST BP >= 140 MM HG: CPT | Mod: CPTII,S$GLB,, | Performed by: NURSE PRACTITIONER

## 2019-10-18 PROCEDURE — 3079F PR MOST RECENT DIASTOLIC BLOOD PRESSURE 80-89 MM HG: ICD-10-PCS | Mod: CPTII,S$GLB,, | Performed by: NURSE PRACTITIONER

## 2019-10-18 PROCEDURE — 3077F PR MOST RECENT SYSTOLIC BLOOD PRESSURE >= 140 MM HG: ICD-10-PCS | Mod: CPTII,S$GLB,, | Performed by: NURSE PRACTITIONER

## 2019-10-18 PROCEDURE — 3008F PR BODY MASS INDEX (BMI) DOCUMENTED: ICD-10-PCS | Mod: CPTII,S$GLB,, | Performed by: NURSE PRACTITIONER

## 2019-10-18 RX ORDER — LEVOTHYROXINE SODIUM 125 UG/1
125 TABLET ORAL
Qty: 30 TABLET | Refills: 5 | Status: SHIPPED | OUTPATIENT
Start: 2019-10-18 | End: 2020-03-05 | Stop reason: SDUPTHER

## 2019-10-18 RX ORDER — INSULIN GLARGINE 100 [IU]/ML
30 INJECTION, SOLUTION SUBCUTANEOUS NIGHTLY
Qty: 15 ML | Refills: 1 | Status: SHIPPED | OUTPATIENT
Start: 2019-10-18 | End: 2020-04-29

## 2019-10-18 NOTE — PROGRESS NOTES
Subjective:       Patient ID: Ladonna Hunter is a 54 y.o. female.    Chief Complaint: Follow-up (4 day follow up)    Here for recheck from Monday visit    Follow-up   Associated symptoms include fatigue. Pertinent negatives include no abdominal pain, arthralgias, congestion, coughing, fever, headaches, myalgias, nausea, sore throat or vomiting.     Review of Systems   Constitutional: Positive for activity change (not doing much), appetite change and fatigue. Negative for fever.   HENT: Negative.  Negative for congestion, ear pain and sore throat.    Eyes: Negative.  Negative for visual disturbance.   Respiratory: Negative.  Negative for cough, shortness of breath and wheezing.    Cardiovascular: Negative.    Gastrointestinal: Positive for diarrhea. Negative for abdominal pain, nausea and vomiting.        Urgency of the bowels with some incontinence daily - leaking from the rectum - some better with the Colestid   Endocrine: Negative.         Blood sugars running in the low 200's   Genitourinary: Negative.  Negative for difficulty urinating and urgency.   Musculoskeletal: Negative.  Negative for arthralgias and myalgias.   Skin: Negative.  Negative for color change.   Allergic/Immunologic: Negative.    Neurological: Negative.  Negative for dizziness and headaches.   Hematological: Negative.    Psychiatric/Behavioral: Positive for dysphoric mood (not really better). Negative for sleep disturbance. The patient is nervous/anxious.    All other systems reviewed and are negative.      Objective:      Physical Exam   Constitutional: She is oriented to person, place, and time. She appears well-developed and well-nourished. No distress.   HENT:   Head: Normocephalic and atraumatic.   Eyes: Pupils are equal, round, and reactive to light.   Conjunctiva pale   Neck: Normal range of motion. Neck supple.   Cardiovascular: Normal rate, regular rhythm and normal heart sounds.   No murmur heard.  Pulmonary/Chest: Effort normal  and breath sounds normal. No respiratory distress.   Musculoskeletal: Normal range of motion. She exhibits edema (2 + pitting edema at the feet).   Neurological: She is alert and oriented to person, place, and time.   Skin: Skin is warm and dry. There is pallor.   Psychiatric: She has a normal mood and affect.   Nursing note and vitals reviewed.      Assessment:       1. Weakness of both lower extremities    2. Hypothyroidism, unspecified type    3. Uncontrolled type 2 diabetes mellitus with hyperglycemia        Plan:     Ladonna was seen today for follow-up.    Diagnoses and all orders for this visit:    Weakness of both lower extremities  -     Ambulatory referral to Neurology    Hypothyroidism, unspecified type  -     levothyroxine (SYNTHROID) 125 MCG tablet; Take 1 tablet (125 mcg total) by mouth before breakfast.  -     Ambulatory referral/consult to Endocrinology; Future    Uncontrolled type 2 diabetes mellitus with hyperglycemia  -     insulin (LANTUS SOLOSTAR U-100 INSULIN) glargine 100 units/mL (3mL) SubQ pen; Inject 30 Units into the skin every evening.  -     Ambulatory referral/consult to Endocrinology; Future    Diarrhea some better. Doesn't generally feel better. Discussed the importance of taking meds as prescribed    RTC 3 weeks for recheck

## 2019-10-25 ENCOUNTER — TELEPHONE (OUTPATIENT)
Dept: SURGERY | Facility: CLINIC | Age: 54
End: 2019-10-25

## 2019-10-25 NOTE — TELEPHONE ENCOUNTER
----- Message from Mustapha Benton sent at 10/25/2019  1:32 PM CDT -----  Contact: self  Pt ask for a call have questions concerning her procedures      Contact info   [phone]

## 2019-10-25 NOTE — TELEPHONE ENCOUNTER
Left message for patient to return call regarding her procedure(colonoscopy).  Patient given endoscopy number.

## 2019-10-29 ENCOUNTER — ANESTHESIA (OUTPATIENT)
Dept: ENDOSCOPY | Facility: HOSPITAL | Age: 54
End: 2019-10-29
Payer: COMMERCIAL

## 2019-10-29 ENCOUNTER — ANESTHESIA EVENT (OUTPATIENT)
Dept: ENDOSCOPY | Facility: HOSPITAL | Age: 54
End: 2019-10-29
Payer: COMMERCIAL

## 2019-10-29 ENCOUNTER — HOSPITAL ENCOUNTER (OUTPATIENT)
Facility: HOSPITAL | Age: 54
Discharge: HOME | End: 2019-10-29
Attending: COLON & RECTAL SURGERY | Admitting: COLON & RECTAL SURGERY
Payer: COMMERCIAL

## 2019-10-29 VITALS
BODY MASS INDEX: 34.24 KG/M2 | RESPIRATION RATE: 18 BRPM | HEART RATE: 80 BPM | OXYGEN SATURATION: 98 % | SYSTOLIC BLOOD PRESSURE: 192 MMHG | TEMPERATURE: 98 F | WEIGHT: 205.5 LBS | HEIGHT: 65 IN | DIASTOLIC BLOOD PRESSURE: 88 MMHG

## 2019-10-29 DIAGNOSIS — D12.6 ADENOMATOUS COLON POLYP: ICD-10-CM

## 2019-10-29 DIAGNOSIS — D12.3 ADENOMATOUS POLYP OF TRANSVERSE COLON: Primary | ICD-10-CM

## 2019-10-29 LAB
POCT GLUCOSE: 201 MG/DL (ref 70–110)
POCT GLUCOSE: 241 MG/DL (ref 70–110)

## 2019-10-29 PROCEDURE — 27202363 HC INJECTION AGENT, SUBMUCOSAL, ANY: Performed by: COLON & RECTAL SURGERY

## 2019-10-29 PROCEDURE — 45385 PR COLONOSCOPY,REMV LESN,SNARE: ICD-10-PCS | Mod: ,,, | Performed by: COLON & RECTAL SURGERY

## 2019-10-29 PROCEDURE — 45381 COLONOSCOPY SUBMUCOUS NJX: CPT | Performed by: COLON & RECTAL SURGERY

## 2019-10-29 PROCEDURE — 27201089 HC SNARE, DISP (ANY): Performed by: COLON & RECTAL SURGERY

## 2019-10-29 PROCEDURE — 82962 GLUCOSE BLOOD TEST: CPT | Performed by: COLON & RECTAL SURGERY

## 2019-10-29 PROCEDURE — 45381 COLONOSCOPY SUBMUCOUS NJX: CPT | Mod: 51,,, | Performed by: COLON & RECTAL SURGERY

## 2019-10-29 PROCEDURE — 63600175 PHARM REV CODE 636 W HCPCS: Performed by: NURSE ANESTHETIST, CERTIFIED REGISTERED

## 2019-10-29 PROCEDURE — 88305 TISSUE EXAM BY PATHOLOGIST: CPT | Performed by: PATHOLOGY

## 2019-10-29 PROCEDURE — 45385 COLONOSCOPY W/LESION REMOVAL: CPT | Performed by: COLON & RECTAL SURGERY

## 2019-10-29 PROCEDURE — E9220 PRA ENDO ANESTHESIA: HCPCS | Mod: ,,, | Performed by: NURSE ANESTHETIST, CERTIFIED REGISTERED

## 2019-10-29 PROCEDURE — 37000008 HC ANESTHESIA 1ST 15 MINUTES: Performed by: COLON & RECTAL SURGERY

## 2019-10-29 PROCEDURE — 63600175 PHARM REV CODE 636 W HCPCS: Performed by: COLON & RECTAL SURGERY

## 2019-10-29 PROCEDURE — 88305 TISSUE SPECIMEN TO PATHOLOGY - SURGERY: ICD-10-PCS | Mod: 26,,, | Performed by: PATHOLOGY

## 2019-10-29 PROCEDURE — 37000009 HC ANESTHESIA EA ADD 15 MINS: Performed by: COLON & RECTAL SURGERY

## 2019-10-29 PROCEDURE — 45385 COLONOSCOPY W/LESION REMOVAL: CPT | Mod: ,,, | Performed by: COLON & RECTAL SURGERY

## 2019-10-29 PROCEDURE — E9220 PRA ENDO ANESTHESIA: ICD-10-PCS | Mod: ,,, | Performed by: NURSE ANESTHETIST, CERTIFIED REGISTERED

## 2019-10-29 PROCEDURE — 45381 PR COLONOSCPY,FLEX,W/DIR SUBMUC INJECT: ICD-10-PCS | Mod: 51,,, | Performed by: COLON & RECTAL SURGERY

## 2019-10-29 PROCEDURE — 27201028 HC NEEDLE, SCLERO: Performed by: COLON & RECTAL SURGERY

## 2019-10-29 RX ORDER — PROPOFOL 10 MG/ML
VIAL (ML) INTRAVENOUS
Status: DISCONTINUED | OUTPATIENT
Start: 2019-10-29 | End: 2019-10-29

## 2019-10-29 RX ORDER — HYDRALAZINE HYDROCHLORIDE 20 MG/ML
INJECTION INTRAMUSCULAR; INTRAVENOUS
Status: DISCONTINUED | OUTPATIENT
Start: 2019-10-29 | End: 2019-10-29

## 2019-10-29 RX ORDER — SODIUM CHLORIDE 9 MG/ML
INJECTION, SOLUTION INTRAVENOUS CONTINUOUS
Status: DISCONTINUED | OUTPATIENT
Start: 2019-10-29 | End: 2019-10-29 | Stop reason: HOSPADM

## 2019-10-29 RX ORDER — PROPOFOL 10 MG/ML
VIAL (ML) INTRAVENOUS CONTINUOUS PRN
Status: DISCONTINUED | OUTPATIENT
Start: 2019-10-29 | End: 2019-10-29

## 2019-10-29 RX ORDER — LIDOCAINE HCL/PF 100 MG/5ML
SYRINGE (ML) INTRAVENOUS
Status: DISCONTINUED | OUTPATIENT
Start: 2019-10-29 | End: 2019-10-29

## 2019-10-29 RX ADMIN — HYDRALAZINE HYDROCHLORIDE 10 MG: 20 INJECTION INTRAMUSCULAR; INTRAVENOUS at 09:10

## 2019-10-29 RX ADMIN — SODIUM CHLORIDE: 0.9 INJECTION, SOLUTION INTRAVENOUS at 08:10

## 2019-10-29 RX ADMIN — PROPOFOL 150 MCG/KG/MIN: 10 INJECTION, EMULSION INTRAVENOUS at 09:10

## 2019-10-29 RX ADMIN — PROPOFOL 70 MG: 10 INJECTION, EMULSION INTRAVENOUS at 09:10

## 2019-10-29 RX ADMIN — LIDOCAINE HYDROCHLORIDE 80 MG: 20 INJECTION, SOLUTION INTRAVENOUS at 09:10

## 2019-10-29 NOTE — PLAN OF CARE
Anesthesia made aware of pt BP post procedure. No new orders at this time. Pt instructed to take BP medications when she gets home. Pt verbalized understanding, will continue to monitor.

## 2019-10-29 NOTE — ANESTHESIA PREPROCEDURE EVALUATION
10/29/2019  Ladonna Hunter is a 54 y.o., female with a history of poorly controlled diabetes with HgbA1C 13.1, HTN, and chronic renal insufficiency. She is presenting for a colonoscopy today.    Anesthesia Evaluation    I have reviewed the Patient Summary Reports.     I have reviewed the Medications.     Review of Systems  Anesthesia Hx:  No problems with previous Anesthesia Denies Hx of Anesthetic complications  Denies Family Hx of Anesthesia complications.   Denies Personal Hx of Anesthesia complications.   Hematology/Oncology:  Hematology Normal   Oncology Normal     EENT/Dental:EENT/Dental Normal   Cardiovascular:   Hypertension ECG has been reviewed.   TTE 2/1/2019  · Normal left ventricular systolic function. The estimated ejection fraction is 65%  · Concentric left ventricular remodeling.  · No wall motion abnormalities.  · Normal LV diastolic function.  · Mild left atrial enlargement.  · Normal right ventricular systolic function.  · Normal central venous pressure (3 mm Hg).   Pulmonary:  Pulmonary Normal    Renal/:   Chronic Renal Disease, CRI    Hepatic/GI:  Hepatic/GI Normal    Musculoskeletal:  Musculoskeletal Normal    Neurological:  Neurology Normal    Endocrine:   Diabetes, poorly controlled, type 2, using insulin A1C- 13.1 AM Glucose -   Dermatological:  Skin Normal    Psych:  Psychiatric Normal           Physical Exam  General:  Well nourished    Airway/Jaw/Neck:  Airway Findings: Mouth Opening: Normal Tongue: Normal  General Airway Assessment: Adult  Mallampati: II  TM Distance: Normal, at least 6 cm  Jaw/Neck Findings:  Neck ROM: Normal ROM     Eyes/Ears/Nose:  EYES/EARS/NOSE FINDINGS: Normal   Dental:  Dental Findings: In tact   Chest/Lungs:  Chest/Lungs Findings: Clear to auscultation, Normal Respiratory Rate     Heart/Vascular:  Heart Findings: Rate: Normal  Rhythm: Regular Rhythm   Sounds: Normal        Mental Status:  Mental Status Findings:  Cooperative, Alert and Oriented         Anesthesia Plan  Type of Anesthesia, risks & benefits discussed:  Anesthesia Type:  general  Patient's Preference: general  Intra-op Monitoring Plan: standard ASA monitors  Intra-op Monitoring Plan Comments:   Post Op Pain Control Plan:   Post Op Pain Control Plan Comments:   Induction:   IV  Beta Blocker:  Patient is not currently on a Beta-Blocker (No further documentation required).       Informed Consent: Patient understands risks and agrees with Anesthesia plan.  Questions answered. Anesthesia consent signed with patient.  ASA Score: 3     Day of Surgery Review of History & Physical: I have interviewed and examined the patient. I have reviewed the patient's H&P dated: 10/29/19.   H&P update referred to the provider.         Ready For Surgery From Anesthesia Perspective.

## 2019-10-29 NOTE — TRANSFER OF CARE
"Anesthesia Transfer of Care Note    Patient: Ladonna Hunter    Procedure(s) Performed: Procedure(s) (LRB):  COLONOSCOPY (N/A)    Patient location: GI    Anesthesia Type: general    Transport from OR: Transported from OR on room air with adequate spontaneous ventilation    Post pain: adequate analgesia    Post assessment: tolerated procedure well and no apparent anesthetic complications    Post vital signs: stable    Level of consciousness: awake    Nausea/Vomiting: no nausea/vomiting    Complications: none    Transfer of care protocol was followed      Last vitals:   Visit Vitals  BP (!) 142/64 (BP Location: Left arm, Patient Position: Lying)   Pulse 66   Temp 36.5 °C (97.7 °F) (Temporal)   Resp 17   Ht 5' 5" (1.651 m)   Wt 93.2 kg (205 lb 7.5 oz)   LMP 04/12/2016   SpO2 98%   Breastfeeding? No   BMI 34.19 kg/m²     "

## 2019-10-29 NOTE — PLAN OF CARE
Pt and sister verbalized understanding of discharge instructions. Pt verbalized understanding to take BP meds at home.

## 2019-10-29 NOTE — [employer] PATIENT INSTRUCTIONS
Discharge Summary/Instructions after an Endoscopic Procedure  Patient Name: Ladonna Hunter  Patient MRN: [id number]  Patient YOB: 1965 Tuesday, October 29, 2019  Yoseph Mendez MD  RESTRICTIONS:  During your procedure today, you received medications for sedation.  These   medications may affect your judgment, balance and coordination.  Therefore,   for 24 hours, you have the following restrictions:   - DO NOT drive a car, operate machinery, make legal/financial decisions,   sign important papers or drink alcohol.    ACTIVITY:  Today: no heavy lifting, straining or running due to procedural   sedation/anesthesia.  The following day: return to full activity including work.  DIET:  Eat and drink normally unless instructed otherwise.     TREATMENT FOR COMMON SIDE EFFECTS:  - Mild abdominal pain, nausea, belching, bloating or excessive gas:  rest,   eat lightly and use a heating pad.  - Sore Throat: treat with throat lozenges and/or gargle with warm salt   water.  - Because air was used during the procedure, expelling large amounts of air   from your rectum or belching is normal.  - If a bowel prep was taken, you may not have a bowel movement for 1-3 days.    This is normal.  SYMPTOMS TO WATCH FOR AND REPORT TO YOUR PHYSICIAN:  1. Abdominal pain or bloating, other than gas cramps.  2. Chest pain.  3. Back pain.  4. Signs of infection such as: chills or fever occurring within 24 hours   after the procedure.  5. Rectal bleeding, which would show as bright red, maroon, or black stools.   (A tablespoon of blood from the rectum is not serious, especially if   hemorrhoids are present.)  6. Vomiting.  7. Weakness or dizziness.  GO DIRECTLY TO THE NEAREST EMERGENCY ROOM IF YOU HAVE ANY OF THE FOLLOWING:      Difficulty breathing              Chills and/or fever over 101 F   Persistent vomiting and/or vomiting blood   Severe abdominal pain   Severe chest pain   Black, tarry stools   Bleeding- more than one  tablespoon   Any other symptom or condition that you feel may need urgent attention  Your doctor recommends these additional instructions:  If any biopsies were taken, your doctors clinic will contact you in 1 to 2   weeks with any results.  - Repeat colonoscopy date to be determined after pending pathology results   are reviewed for surveillance based on pathology results.   - Discharge patient to home.   - Resume previous diet.   - Continue present medications.   - Await pathology results.   - Patient has a contact number available for emergencies.  The signs and   symptoms of potential delayed complications were discussed with the   patient.  Return to normal activities tomorrow.  Written discharge   instructions were provided to the patient.  For questions, problems or results please call your physician - Yoseph Mendez MD at Work:  [phone].  [facility] [place], EMERGENCY ROOM PHONE NUMBER: [phone]  IF A COMPLICATION OR EMERGENCY SITUATION ARISES AND YOU ARE UNABLE TO REACH   YOUR PHYSICIAN - GO DIRECTLY TO THE EMERGENCY ROOM.  Yoseph Mendez MD  10/29/2019 10:03:02 AM  This report has been verified and signed electronically.  [employer]

## 2019-10-29 NOTE — ANESTHESIA POSTPROCEDURE EVALUATION
Anesthesia Post Evaluation    Patient: Ladonna Hunter    Procedure(s) Performed: Procedure(s) (LRB):  COLONOSCOPY (N/A)    Final Anesthesia Type: general  Patient location during evaluation: GI PACU  Patient participation: Yes- Able to Participate  Level of consciousness: awake and alert and oriented  Post-procedure vital signs: reviewed and stable  Pain management: adequate  Airway patency: patent  PONV status at discharge: No PONV  Anesthetic complications: no      Cardiovascular status: hemodynamically stable and blood pressure returned to baseline  Respiratory status: unassisted, spontaneous ventilation and room air  Hydration status: euvolemic  Follow-up not needed.          Vitals Value Taken Time   /79 10/29/2019 10:19 AM   Temp 36.5 °C (97.7 °F) 10/29/2019 10:08 AM   Pulse 70 10/29/2019 10:19 AM   Resp 18 10/29/2019 10:19 AM   SpO2 99 % 10/29/2019 10:19 AM         No case tracking events are documented in the log.      Pain/Mariya Score: Mariya Score: 10 (10/29/2019 10:21 AM)

## 2019-10-29 NOTE — H&P
Procedure : Colonoscopy    Indication(s):  For therapy of known polyp    Review of patient's allergies indicates:  No Known Allergies    Past Medical History:   Diagnosis Date    Anemia     CHF (congestive heart failure)     Diabetes mellitus, type 2     Hypertension     Thyroid disease        Prior to Admission medications    Medication Sig Start Date End Date Taking? Authorizing Provider   colestipol (COLESTID) 1 gram Tab Take 1-4 tablets (1-4 g total) by mouth daily as needed (diarrhea). 10/14/19  Yes Mayra Islas NP   ergocalciferol (ERGOCALCIFEROL) 50,000 unit Cap Take 1 capsule (50,000 Units total) by mouth every 7 days. 6/27/19  Yes Mayra Islas NP   escitalopram oxalate (LEXAPRO) 10 MG tablet Take 1 tablet (10 mg total) by mouth once daily. 10/14/19 10/13/20 Yes Mayra Islas NP   fenofibrate micronized (LOFIBRA) 134 MG Cap Take 1 capsule (134 mg total) by mouth daily with breakfast. 10/14/19 10/13/20 Yes Mayra Islas NP   fluconazole (DIFLUCAN) 150 MG Tab 1 today and repeat in 1 week if necessary 10/14/19  Yes Mayra Islas NP   glimepiride (AMARYL) 4 MG tablet Take 1 tablet (4 mg total) by mouth daily with breakfast.  Patient taking differently: Take 4 mg by mouth daily with breakfast.  6/12/19 6/11/20 Yes Mayra Islas NP   insulin (LANTUS SOLOSTAR U-100 INSULIN) glargine 100 units/mL (3mL) SubQ pen Inject 30 Units into the skin every evening. 10/18/19 10/17/20 Yes Mayra Islas NP   levothyroxine (SYNTHROID) 125 MCG tablet Take 1 tablet (125 mcg total) by mouth before breakfast. 10/18/19  Yes Mayra Islas NP   oxybutynin (DITROPAN-XL) 10 MG 24 hr tablet Take 1 tablet (10 mg total) by mouth once daily. 6/12/19 6/11/20 Yes Mayra Islas NP   torsemide (DEMADEX) 20 MG Tab Take 1 tablet (20 mg total) by mouth once daily. 6/12/19 6/11/20 Yes Mayra Islas, NP   flash glucose scanning reader (TellybeanYLE DARIUS 14 DAY READER) [facility] Blood sugar  "readings AC and HS 6/27/19   Mayra Islas NP   flash glucose sensor (FREESTYLE DARIUS 14 DAY SENSOR) Kit Blood sugars AC and HS to slide 6/27/19   Mayra Islas NP   insulin aspart U-100 (NOVOLOG FLEXPEN U-100 INSULIN) 100 unit/mL (3 mL) InPn pen Inject 10 Units into the skin 3 (three) times daily with meals. 6/27/19 6/26/20  Mayra Islas NP   losartan-hydrochlorothiazide 100-12.5 mg (HYZAAR) 100-12.5 mg Tab Take 1 tablet by mouth once daily. 6/14/19   Mayra Islas NP   nystatin (MYCOSTATIN) cream Apply topically 3 (three) times daily. To vaginal area 10/14/19   Mayra Islas NP   pen needle, diabetic 32 gauge x 1/4" Ndle Use to inject with insulin four times a day 6/27/19   Mayra Islas NP   sodium,potassium,mag sulfates (SUPREP BOWEL PREP KIT) 17.5-3.13-1.6 gram SolR Take 354 millililiters by mouth once per Dr. Mendez's instruction sheet for 1 dose. 9/27/19   Yoseph Mendez MD       Sedation Problems: NO    Family History   Problem Relation Age of Onset    Diverticulitis Mother     Diabetes Mother     Heart disease Father        Fam Hx of Sedation Problems: NO    Social History     Socioeconomic History    Marital status: Single     Spouse name: Not on file    Number of children: Not on file    Years of education: Not on file    Highest education level: Not on file   Occupational History    Not on file   Social Needs    Financial resource strain: Not on file    Food insecurity:     Worry: Not on file     Inability: Not on file    Transportation needs:     Medical: Not on file     Non-medical: Not on file   Tobacco Use    Smoking status: Never Smoker    Smokeless tobacco: Never Used   Substance and Sexual Activity    Alcohol use: Yes     Comment: socially once every 3 years    Drug use: No    Sexual activity: Never   Lifestyle    Physical activity:     Days per week: Not on file     Minutes per session: Not on file    Stress: Not on file   Relationships    " Social connections:     Talks on phone: Not on file     Gets together: Not on file     Attends Mandaeism service: Not on file     Active member of club or organization: Not on file     Attends meetings of clubs or organizations: Not on file     Relationship status: Not on file   Other Topics Concern    Not on file   Social History Narrative    Not on file       Review of Systems -     Respiratory ROS: no cough, shortness of breath, or wheezing  Cardiovascular ROS: no chest pain or dyspnea on exertion  Gastrointestinal ROS: no abdominal pain, change in bowel habits, or black or bloody stools  Musculoskeletal ROS: negative  Neurological ROS: no TIA or stroke symptoms        Physical Exam:  General: no distress  Head: normocephalic  Airway:  normal oropharynx, airway normal  Neck: supple, symmetrical, trachea midline  Lungs:  normal respiratory effort  Heart: regular rate and rhythm  Abdomen: soft, non-tender non-distented; bowel sounds normal; no masses,  no organomegaly  Extremities: no cyanosis or edema, or clubbing       Deep Sedation: Mallampati Score per anesthesia     SedationPlan :Moderate     ASA : III    Patient is medically cleared for anesthesia.    Anesthesia/Surgery risks, benefits and alternative options discussed and understood by patient/family.

## 2019-11-01 ENCOUNTER — PATIENT OUTREACH (OUTPATIENT)
Dept: ADMINISTRATIVE | Facility: OTHER | Age: 54
End: 2019-11-01

## 2019-11-04 ENCOUNTER — LAB VISIT (OUTPATIENT)
Dept: LAB | Facility: HOSPITAL | Age: 54
End: 2019-11-04
Attending: PSYCHIATRY & NEUROLOGY
Payer: COMMERCIAL

## 2019-11-04 ENCOUNTER — OFFICE VISIT (OUTPATIENT)
Dept: NEUROLOGY | Facility: CLINIC | Age: 54
End: 2019-11-04
Payer: COMMERCIAL

## 2019-11-04 VITALS
HEART RATE: 66 BPM | RESPIRATION RATE: 16 BRPM | HEIGHT: 65 IN | WEIGHT: 210.75 LBS | DIASTOLIC BLOOD PRESSURE: 94 MMHG | BODY MASS INDEX: 35.11 KG/M2 | SYSTOLIC BLOOD PRESSURE: 168 MMHG

## 2019-11-04 DIAGNOSIS — R26.9 ABNORMAL GAIT: ICD-10-CM

## 2019-11-04 DIAGNOSIS — M54.50 LOW BACK PAIN, NON-SPECIFIC: ICD-10-CM

## 2019-11-04 DIAGNOSIS — R20.0 LEG NUMBNESS: ICD-10-CM

## 2019-11-04 DIAGNOSIS — R29.898 WEAKNESS OF BOTH LOWER EXTREMITIES: ICD-10-CM

## 2019-11-04 DIAGNOSIS — R29.898 WEAKNESS OF BOTH LOWER EXTREMITIES: Primary | ICD-10-CM

## 2019-11-04 LAB — CK SERPL-CCNC: 121 U/L (ref 20–180)

## 2019-11-04 PROCEDURE — 36415 COLL VENOUS BLD VENIPUNCTURE: CPT

## 2019-11-04 PROCEDURE — 3008F PR BODY MASS INDEX (BMI) DOCUMENTED: ICD-10-PCS | Mod: CPTII,S$GLB,, | Performed by: PSYCHIATRY & NEUROLOGY

## 2019-11-04 PROCEDURE — 3008F BODY MASS INDEX DOCD: CPT | Mod: CPTII,S$GLB,, | Performed by: PSYCHIATRY & NEUROLOGY

## 2019-11-04 PROCEDURE — 99999 PR PBB SHADOW E&M-EST. PATIENT-LVL III: ICD-10-PCS | Mod: PBBFAC,,, | Performed by: PSYCHIATRY & NEUROLOGY

## 2019-11-04 PROCEDURE — 99204 OFFICE O/P NEW MOD 45 MIN: CPT | Mod: S$GLB,,, | Performed by: PSYCHIATRY & NEUROLOGY

## 2019-11-04 PROCEDURE — 3080F DIAST BP >= 90 MM HG: CPT | Mod: CPTII,S$GLB,, | Performed by: PSYCHIATRY & NEUROLOGY

## 2019-11-04 PROCEDURE — 99999 PR PBB SHADOW E&M-EST. PATIENT-LVL III: CPT | Mod: PBBFAC,,, | Performed by: PSYCHIATRY & NEUROLOGY

## 2019-11-04 PROCEDURE — 82607 VITAMIN B-12: CPT

## 2019-11-04 PROCEDURE — 99204 PR OFFICE/OUTPT VISIT, NEW, LEVL IV, 45-59 MIN: ICD-10-PCS | Mod: S$GLB,,, | Performed by: PSYCHIATRY & NEUROLOGY

## 2019-11-04 PROCEDURE — 3077F SYST BP >= 140 MM HG: CPT | Mod: CPTII,S$GLB,, | Performed by: PSYCHIATRY & NEUROLOGY

## 2019-11-04 PROCEDURE — 3077F PR MOST RECENT SYSTOLIC BLOOD PRESSURE >= 140 MM HG: ICD-10-PCS | Mod: CPTII,S$GLB,, | Performed by: PSYCHIATRY & NEUROLOGY

## 2019-11-04 PROCEDURE — 82550 ASSAY OF CK (CPK): CPT

## 2019-11-04 PROCEDURE — 3080F PR MOST RECENT DIASTOLIC BLOOD PRESSURE >= 90 MM HG: ICD-10-PCS | Mod: CPTII,S$GLB,, | Performed by: PSYCHIATRY & NEUROLOGY

## 2019-11-04 NOTE — PROGRESS NOTES
Consult from PAM Islas    HPI: Ladonna Hunter is a 54 y.o. female with history of  Weakness in the legs worsening over the past year but present for many years.     She reports that she has difficulty climbing in and out of vehicles for the past year. She has numbness and tingling in the legs, she feels the entire legs. She has no burning pain or pain, just numbness. She does not have much back pain.   She feels she has difficulty with strength in the hands as well and this goes numb as well.   She is treated for depression via PCP. She states she has poor motivation. No SI/HI  She states she does not take insulin unless unless blood glucose is greatly than 300 and she does not take her oral meds regularly   She has some back pain which is non-radicular. She reports incontinence often of her bladder and bowels        She had DM2 which is poorly controlled. She has been diabetic for 10 years.     Review of Systems   Constitutional: Negative for fever.   HENT: Negative for nosebleeds.    Eyes: Negative for double vision.   Respiratory: Negative for hemoptysis.    Cardiovascular: Negative for leg swelling.   Gastrointestinal: Negative for blood in stool.   Genitourinary: Negative for hematuria.   Musculoskeletal: Negative for falls.   Neurological: Positive for sensory change.   Endo/Heme/Allergies: Does not bruise/bleed easily.   Psychiatric/Behavioral: The patient does not have insomnia.          I have reviewed all of this patient's past medical and surgical histories as well as family and social histories and active allergies and medications as documented in the electronic medical record.        Exam:  Gen Appearance, well developed/nourished in no apparent distress  CV: 2+ distal pulses with no edema or swelling in the arms but with 1+ LE edema, chronic per patient with signs of venous stasis.   Neuro:  MS: Awake, alert, oriented to place, person, time, situation. Sustains attention. Recent/remote memory intact,  Language is full to spontaneous speech/repetition/naming/comprehension. Fund of Knowledge is full  CN: Optic discs are flat with normal vasculature, PERRL, Extraoccular movements and visual fields are full. Normal facial sensation and strength, Hearing symmetric, Tongue and Palate are midline and strong. Shoulder Shrug symmetric and strong.  Motor: Normal bulk, tone, no abnormal movements. 5/5 strength bilateral upper/lower extremities except 4/5 in the hands and feet and 4-/5 in the bilateral hip flexers. with 1+ reflexes in the arms and absent in the legs and bilateral plantar response  Sensory: reduced to light touch, pain, temp, and vibration in the feet and hands,  Romberg mildly positive   Cerebellar: Finger-nose,Heal-shin, Rapid alternating movements intact  Gait: Normal stance, no ataxia, but slowed gait    Labs: CMP, CBC reviewed, A1C 13.1, TSH hypothyroid range, addressed by PCP      Assessment/Plan: Ladonna Hunter is a 54 y.o. female with weakness in the legs and hands for years, , worsening this past year and associated with numbness. She has poorly controlled DM2. Exam shows signs of neuropathy and hip flexor weakness  I recommend:   1. MRI brain and L spine needed. She has more weakness than expected for neuropathy on exam and has complaints of bladder and bowel incontinence   2. EMG/NCS of the arm and legs. Likely does have diabetic neuropathy and may have amyopathy as well.   3. B12, CK  4. More work up may be needed depending on the above results.   5. She has DM2 which is poorly controlled (A1C 13.1, was greater than 14 this year). She is pending endocrinology appointment. Non-compliance with insulin is a factor- she does not take insulin unless unless blood glucose is greatly than 300 and she does not take her oral meds regularly. Urged compliance as this poorly controlled DM2  is likely contributing to her neuropathy/ weakness complaints. Also note she is hypothyroid range, addressed by  PCP    RTC for EMG/NCS  CC:PAM Islas

## 2019-11-04 NOTE — LETTER
November 4, 2019      Mayra Islas, NP  [address]  [facility] [address]           [facility]  [address] [facility] [address]  Phone: [phone]  Fax: [phone]          Patient: Ladonna Hunter   MR Number: [id number]   YOB: 1965   Date of Visit: 11/4/2019       Dear Mayra Islas:    Thank you for referring Ladonna Hunter to me for evaluation. Attached you will find relevant portions of my assessment and plan of care.    If you have questions, please do not hesitate to call me. I look forward to following Ladonna Hutner along with you.    Sincerely,    José Manuel Torres MD    Enclosure  CC:  No Recipients    If you would like to receive this communication electronically, please contact [email or web address] or [phone] to request more information on [facility] [employer] access.    For providers and/or their staff who would like to refer a patient to [facility], please contact us through our one-stop-shop provider referral line, [facility], at [phone].    If you feel you have received this communication in error or would no longer like to receive these types of communications, please e-mail [email or web address]

## 2019-11-05 ENCOUNTER — TELEPHONE (OUTPATIENT)
Dept: ENDOSCOPY | Facility: HOSPITAL | Age: 54
End: 2019-11-05

## 2019-11-05 LAB — VIT B12 SERPL-MCNC: 359 PG/ML (ref 210–950)

## 2019-11-06 ENCOUNTER — TELEPHONE (OUTPATIENT)
Dept: SURGERY | Facility: CLINIC | Age: 54
End: 2019-11-06

## 2019-11-06 DIAGNOSIS — Z86.010 HISTORY OF COLON POLYPS: Primary | ICD-10-CM

## 2019-11-06 NOTE — TELEPHONE ENCOUNTER
Spoke with patient. Informed her of results. No questions or comments at this time. Will mail letter.

## 2019-11-06 NOTE — TELEPHONE ENCOUNTER
----- Message from Yoseph Mendez MD sent at 11/5/2019  9:43 PM CST -----  Pathology reveals 2 adenomas - the smaller 1 was a tubular adenoma, the larger one was also a tubular adenoma but contained some areas of low-grade dysplasia (pre cancerous cells).  This was removed in a piecemeal fashion, it was too large to remove in 1 piece.  She should have a repeat colonoscopy in 6 months to re-evaluate this area.  Please let patient know.  Thx.

## 2019-11-08 ENCOUNTER — HOSPITAL ENCOUNTER (OUTPATIENT)
Dept: RADIOLOGY | Facility: HOSPITAL | Age: 54
Discharge: HOME | End: 2019-11-08
Attending: PSYCHIATRY & NEUROLOGY
Payer: COMMERCIAL

## 2019-11-08 ENCOUNTER — OFFICE VISIT (OUTPATIENT)
Dept: FAMILY MEDICINE | Facility: CLINIC | Age: 54
End: 2019-11-08
Payer: COMMERCIAL

## 2019-11-08 VITALS
SYSTOLIC BLOOD PRESSURE: 144 MMHG | BODY MASS INDEX: 34.79 KG/M2 | HEIGHT: 65 IN | HEART RATE: 68 BPM | DIASTOLIC BLOOD PRESSURE: 82 MMHG | WEIGHT: 208.81 LBS | RESPIRATION RATE: 18 BRPM

## 2019-11-08 DIAGNOSIS — R29.898 WEAKNESS OF BOTH LOWER EXTREMITIES: ICD-10-CM

## 2019-11-08 DIAGNOSIS — R26.9 ABNORMAL GAIT: ICD-10-CM

## 2019-11-08 DIAGNOSIS — R19.7 DIARRHEA, UNSPECIFIED TYPE: ICD-10-CM

## 2019-11-08 DIAGNOSIS — M54.50 LOW BACK PAIN, NON-SPECIFIC: ICD-10-CM

## 2019-11-08 DIAGNOSIS — E11.65 UNCONTROLLED TYPE 2 DIABETES MELLITUS WITH HYPERGLYCEMIA: ICD-10-CM

## 2019-11-08 DIAGNOSIS — R20.0 LEG NUMBNESS: ICD-10-CM

## 2019-11-08 DIAGNOSIS — E03.9 HYPOTHYROIDISM, UNSPECIFIED TYPE: Primary | ICD-10-CM

## 2019-11-08 PROCEDURE — 3079F PR MOST RECENT DIASTOLIC BLOOD PRESSURE 80-89 MM HG: ICD-10-PCS | Mod: CPTII,S$GLB,, | Performed by: NURSE PRACTITIONER

## 2019-11-08 PROCEDURE — 99999 PR PBB SHADOW E&M-EST. PATIENT-LVL IV: CPT | Mod: PBBFAC,,, | Performed by: NURSE PRACTITIONER

## 2019-11-08 PROCEDURE — 70551 MRI BRAIN WITHOUT CONTRAST: ICD-10-PCS | Mod: 26,,, | Performed by: RADIOLOGY

## 2019-11-08 PROCEDURE — 70551 MRI BRAIN STEM W/O DYE: CPT | Mod: TC

## 2019-11-08 PROCEDURE — 3046F PR MOST RECENT HEMOGLOBIN A1C LEVEL > 9.0%: ICD-10-PCS | Mod: CPTII,S$GLB,, | Performed by: NURSE PRACTITIONER

## 2019-11-08 PROCEDURE — 3077F PR MOST RECENT SYSTOLIC BLOOD PRESSURE >= 140 MM HG: ICD-10-PCS | Mod: CPTII,S$GLB,, | Performed by: NURSE PRACTITIONER

## 2019-11-08 PROCEDURE — 70551 MRI BRAIN STEM W/O DYE: CPT | Mod: 26,,, | Performed by: RADIOLOGY

## 2019-11-08 PROCEDURE — 99213 PR OFFICE/OUTPT VISIT, EST, LEVL III, 20-29 MIN: ICD-10-PCS | Mod: S$GLB,,, | Performed by: NURSE PRACTITIONER

## 2019-11-08 PROCEDURE — 72148 MRI LUMBAR SPINE WITHOUT CONTRAST: ICD-10-PCS | Mod: 26,,, | Performed by: RADIOLOGY

## 2019-11-08 PROCEDURE — 3046F HEMOGLOBIN A1C LEVEL >9.0%: CPT | Mod: CPTII,S$GLB,, | Performed by: NURSE PRACTITIONER

## 2019-11-08 PROCEDURE — 72148 MRI LUMBAR SPINE W/O DYE: CPT | Mod: 26,,, | Performed by: RADIOLOGY

## 2019-11-08 PROCEDURE — 72148 MRI LUMBAR SPINE W/O DYE: CPT | Mod: TC

## 2019-11-08 PROCEDURE — 3008F BODY MASS INDEX DOCD: CPT | Mod: CPTII,S$GLB,, | Performed by: NURSE PRACTITIONER

## 2019-11-08 PROCEDURE — 3077F SYST BP >= 140 MM HG: CPT | Mod: CPTII,S$GLB,, | Performed by: NURSE PRACTITIONER

## 2019-11-08 PROCEDURE — 3008F PR BODY MASS INDEX (BMI) DOCUMENTED: ICD-10-PCS | Mod: CPTII,S$GLB,, | Performed by: NURSE PRACTITIONER

## 2019-11-08 PROCEDURE — 99213 OFFICE O/P EST LOW 20 MIN: CPT | Mod: S$GLB,,, | Performed by: NURSE PRACTITIONER

## 2019-11-08 PROCEDURE — 3079F DIAST BP 80-89 MM HG: CPT | Mod: CPTII,S$GLB,, | Performed by: NURSE PRACTITIONER

## 2019-11-08 PROCEDURE — 99999 PR PBB SHADOW E&M-EST. PATIENT-LVL IV: ICD-10-PCS | Mod: PBBFAC,,, | Performed by: NURSE PRACTITIONER

## 2019-11-08 NOTE — PROGRESS NOTES
Subjective:       Patient ID: Ladonna Hunter is a 54 y.o. female.    Chief Complaint: Follow-up ( 3week )    Here for recheck of DM. Had a colonoscopy done in October. Diarrhea is better with the Colestid.     Follow-up   Associated symptoms include fatigue (better). Pertinent negatives include no abdominal pain, arthralgias, congestion, coughing, fever, headaches, myalgias, nausea, sore throat or vomiting.     Review of Systems   Constitutional: Positive for activity change (has picked up a little bit in the last 2-3 days), appetite change (poor appetite) and fatigue (better). Negative for fever.   HENT: Negative.  Negative for congestion, ear pain and sore throat.    Eyes: Negative.  Negative for visual disturbance.   Respiratory: Negative.  Negative for cough, shortness of breath and wheezing.    Cardiovascular: Negative.    Gastrointestinal: Positive for diarrhea (better). Negative for abdominal pain, nausea and vomiting.        Leakage is better   Endocrine: Positive for polyuria.        Blood sugars running high in the morning and lower at during the day   Genitourinary: Negative.  Negative for difficulty urinating and urgency.        Some bladder incontinence   Musculoskeletal: Negative.  Negative for arthralgias and myalgias.   Skin: Negative.  Negative for color change.   Allergic/Immunologic: Negative.    Neurological: Negative.  Negative for dizziness and headaches.   Hematological: Negative.    Psychiatric/Behavioral: Positive for dysphoric mood (not crying as much). Negative for sleep disturbance. The patient is nervous/anxious (a little better).    All other systems reviewed and are negative.      Objective:      Physical Exam   Constitutional: She is oriented to person, place, and time. She appears well-developed and well-nourished. No distress.   HENT:   Head: Normocephalic and atraumatic.   Eyes: Pupils are equal, round, and reactive to light.   Conjunctiva pale   Neck: Normal range of motion.  Neck supple.   Cardiovascular: Normal rate, regular rhythm and normal heart sounds.   No murmur heard.  Pulmonary/Chest: Effort normal and breath sounds normal. No respiratory distress.   Musculoskeletal: Normal range of motion. She exhibits edema (edema at the lower legs with some stasis dermatitis - worse at the right leg.).   Neurological: She is alert and oriented to person, place, and time.   Skin: Skin is warm and dry. No pallor (color better today).   Psychiatric: She has a normal mood and affect.   Nursing note and vitals reviewed.      Assessment:       1. Hypothyroidism, unspecified type    2. Uncontrolled type 2 diabetes mellitus with hyperglycemia    3. Diarrhea, unspecified type        Plan:     Ladonna was seen today for follow-up.    Diagnoses and all orders for this visit:    Hypothyroidism, unspecified type    Uncontrolled type 2 diabetes mellitus with hyperglycemia - blood sugars higher in the morning - take glimepiride with evening meal    Diarrhea, unspecified type - up the Colestid to 2 tabs BID    RTC 6 weeks for recheck

## 2019-11-11 DIAGNOSIS — M48.062 SPINAL STENOSIS OF LUMBAR REGION WITH NEUROGENIC CLAUDICATION: ICD-10-CM

## 2019-11-11 DIAGNOSIS — N13.30 HYDROURETERONEPHROSIS: Primary | ICD-10-CM

## 2019-11-11 DIAGNOSIS — N32.89 BLADDER MASS: ICD-10-CM

## 2019-11-13 ENCOUNTER — TELEPHONE (OUTPATIENT)
Dept: FAMILY MEDICINE | Facility: CLINIC | Age: 54
End: 2019-11-13

## 2019-11-13 NOTE — TELEPHONE ENCOUNTER
----- Message from Beth Perez sent at 2019  4:07 PM CST -----  Contact: self  Ladonna Hunter  MRN: [id number]  : 1965  PCP: Mayra Islas  Home Phone      [phone]  Work Phone      Not on file.  Mobile          [phone]      MESSAGE:   Patient would like to check status on disability paper she dropped off last week.     [phone]

## 2019-11-18 ENCOUNTER — HOSPITAL ENCOUNTER (OUTPATIENT)
Dept: RADIOLOGY | Facility: HOSPITAL | Age: 54
Discharge: HOME | End: 2019-11-18
Attending: PSYCHIATRY & NEUROLOGY
Payer: COMMERCIAL

## 2019-11-18 DIAGNOSIS — N32.89 BLADDER MASS: ICD-10-CM

## 2019-11-18 DIAGNOSIS — E11.9 DM (DIABETES MELLITUS): Primary | ICD-10-CM

## 2019-11-18 PROCEDURE — 74178 CT UROGRAM ABD PELVIS W WO: ICD-10-PCS | Mod: 26,,, | Performed by: RADIOLOGY

## 2019-11-18 PROCEDURE — 74178 CT ABD&PLV WO CNTR FLWD CNTR: CPT | Mod: TC

## 2019-11-18 PROCEDURE — 74178 CT ABD&PLV WO CNTR FLWD CNTR: CPT | Mod: 26,,, | Performed by: RADIOLOGY

## 2019-11-18 PROCEDURE — 25500020 PHARM REV CODE 255: Performed by: PSYCHIATRY & NEUROLOGY

## 2019-11-18 RX ADMIN — IOHEXOL 150 ML: 350 INJECTION, SOLUTION INTRAVENOUS at 11:11

## 2019-11-19 ENCOUNTER — PATIENT OUTREACH (OUTPATIENT)
Dept: ADMINISTRATIVE | Facility: OTHER | Age: 54
End: 2019-11-19

## 2019-11-20 ENCOUNTER — OFFICE VISIT (OUTPATIENT)
Dept: UROLOGY | Facility: CLINIC | Age: 54
End: 2019-11-20
Attending: UROLOGY
Payer: COMMERCIAL

## 2019-11-20 VITALS — HEIGHT: 65 IN | BODY MASS INDEX: 34.78 KG/M2 | RESPIRATION RATE: 16 BRPM | WEIGHT: 208.75 LBS

## 2019-11-20 DIAGNOSIS — N13.30 BILATERAL HYDRONEPHROSIS: Primary | ICD-10-CM

## 2019-11-20 DIAGNOSIS — N30.00 ACUTE CYSTITIS WITHOUT HEMATURIA: ICD-10-CM

## 2019-11-20 PROCEDURE — 87077 CULTURE AEROBIC IDENTIFY: CPT

## 2019-11-20 PROCEDURE — 99204 OFFICE O/P NEW MOD 45 MIN: CPT | Mod: S$GLB,,, | Performed by: UROLOGY

## 2019-11-20 PROCEDURE — 87186 SC STD MICRODIL/AGAR DIL: CPT

## 2019-11-20 PROCEDURE — 99204 PR OFFICE/OUTPT VISIT, NEW, LEVL IV, 45-59 MIN: ICD-10-PCS | Mod: S$GLB,,, | Performed by: UROLOGY

## 2019-11-20 PROCEDURE — 87088 URINE BACTERIA CULTURE: CPT

## 2019-11-20 PROCEDURE — 3008F BODY MASS INDEX DOCD: CPT | Mod: CPTII,S$GLB,, | Performed by: UROLOGY

## 2019-11-20 PROCEDURE — 99999 PR PBB SHADOW E&M-EST. PATIENT-LVL III: ICD-10-PCS | Mod: PBBFAC,,, | Performed by: UROLOGY

## 2019-11-20 PROCEDURE — 87086 URINE CULTURE/COLONY COUNT: CPT

## 2019-11-20 PROCEDURE — 99999 PR PBB SHADOW E&M-EST. PATIENT-LVL III: CPT | Mod: PBBFAC,,, | Performed by: UROLOGY

## 2019-11-20 PROCEDURE — 3008F PR BODY MASS INDEX (BMI) DOCUMENTED: ICD-10-PCS | Mod: CPTII,S$GLB,, | Performed by: UROLOGY

## 2019-11-20 RX ORDER — AMOXICILLIN AND CLAVULANATE POTASSIUM 875; 125 MG/1; MG/1
1 TABLET, FILM COATED ORAL EVERY 12 HOURS
Qty: 20 TABLET | Refills: 1 | Status: SHIPPED | OUTPATIENT
Start: 2019-11-20 | End: 2020-05-15

## 2019-11-20 NOTE — PROGRESS NOTES
Subjective:       Patient ID: Ladonna Hunter is a 54 y.o. female.    Chief Complaint: hydronephrosis (pvr 252ml)    HPI patient is here with a CT scan demonstrating bilateral hydro ureteral nephrosis down to the level of the bladder and a distended bladder.  Her postvoid residual is 250 cc.  She is an insulin-dependent diabetic and states that she does not always feel the urge to urinate.  She has been having trouble with anasarca   And her kidney function has diminished over the past month.  No flank pain history of stones.  She does have a family history of renal failure and her father and her mother  Past Medical History:   Diagnosis Date    Anemia     CHF (congestive heart failure)     Diabetes mellitus, type 2     Hypertension     Thyroid disease        Past Surgical History:   Procedure Laterality Date     SECTION      CHOLECYSTECTOMY      COLONOSCOPY N/A 2019    Procedure: COLONOSCOPY WITH BIOPSY;  Surgeon: Yoseph Mendez MD;  Location: [facility];  Service: Colon and Rectal;  Laterality: N/A;    COLONOSCOPY N/A 10/29/2019    Procedure: COLONOSCOPY;  Surgeon: Yoseph Mendez MD;  Location: [facility] ([facility]);  Service: Colon and Rectal;  Laterality: N/A;    TONSILLECTOMY      URETHRA SURGERY         Family History   Problem Relation Age of Onset    Diverticulitis Mother     Diabetes Mother     Heart disease Father        Social History     Socioeconomic History    Marital status: Single     Spouse name: Not on file    Number of children: Not on file    Years of education: Not on file    Highest education level: Not on file   Occupational History    Not on file   Social Needs    Financial resource strain: Not on file    Food insecurity:     Worry: Not on file     Inability: Not on file    Transportation needs:     Medical: Not on file     Non-medical: Not on file   Tobacco Use    Smoking status: Never Smoker    Smokeless tobacco: Never Used   Substance and Sexual Activity     Alcohol use: Yes     Comment: socially once every 3 years    Drug use: No    Sexual activity: Never   Lifestyle    Physical activity:     Days per week: Not on file     Minutes per session: Not on file    Stress: Not on file   Relationships    Social connections:     Talks on phone: Not on file     Gets together: Not on file     Attends Confucianism service: Not on file     Active member of club or organization: Not on file     Attends meetings of clubs or organizations: Not on file     Relationship status: Not on file   Other Topics Concern    Not on file   Social History Narrative    Not on file       Allergies:  Patient has no known allergies.    Medications:    Current Outpatient Medications:     amoxicillin-clavulanate 875-125mg (AUGMENTIN) 875-125 mg per tablet, Take 1 tablet by mouth every 12 (twelve) hours., Disp: 20 tablet, Rfl: 1    colestipol (COLESTID) 1 gram Tab, Take 1-4 tablets (1-4 g total) by mouth daily as needed (diarrhea)., Disp: 240 tablet, Rfl: 3    ergocalciferol (ERGOCALCIFEROL) 50,000 unit Cap, Take 1 capsule (50,000 Units total) by mouth every 7 days., Disp: 12 capsule, Rfl: 3    escitalopram oxalate (LEXAPRO) 10 MG tablet, Take 1 tablet (10 mg total) by mouth once daily., Disp: 30 tablet, Rfl: 2    fenofibrate micronized (LOFIBRA) 134 MG Cap, Take 1 capsule (134 mg total) by mouth daily with breakfast., Disp: 30 capsule, Rfl: 5    flash glucose scanning reader (FREESTYLE DARIUS 14 DAY READER) Misc, Blood sugar readings AC and HS, Disp: 1 each, Rfl: 6    flash glucose sensor (FREESTYLE DARIUS 14 DAY SENSOR) Kit, Blood sugars AC and HS to slide, Disp: 1 kit, Rfl: 6    glimepiride (AMARYL) 4 MG tablet, Take 1 tablet (4 mg total) by mouth daily with breakfast. (Patient taking differently: Take 4 mg by mouth daily with breakfast. ), Disp: 30 tablet, Rfl: 5    insulin (LANTUS SOLOSTAR U-100 INSULIN) glargine 100 units/mL (3mL) SubQ pen, Inject 30 Units into the skin every  "evening., Disp: 15 mL, Rfl: 1    insulin aspart U-100 (NOVOLOG FLEXPEN U-100 INSULIN) 100 unit/mL (3 mL) InPn pen, Inject 10 Units into the skin 3 (three) times daily with meals., Disp: 15 mL, Rfl: 1    levothyroxine (SYNTHROID) 125 MCG tablet, Take 1 tablet (125 mcg total) by mouth before breakfast., Disp: 30 tablet, Rfl: 5    losartan-hydrochlorothiazide 100-12.5 mg (HYZAAR) 100-12.5 mg Tab, Take 1 tablet by mouth once daily., Disp: 30 tablet, Rfl: 5    oxybutynin (DITROPAN-XL) 10 MG 24 hr tablet, Take 1 tablet (10 mg total) by mouth once daily., Disp: 30 tablet, Rfl: 11    pen needle, diabetic 32 gauge x 1/4" Ndle, Use to inject with insulin four times a day, Disp: 100 each, Rfl: 1    torsemide (DEMADEX) 20 MG Tab, Take 1 tablet (20 mg total) by mouth once daily., Disp: 30 tablet, Rfl: 5    Review of Systems   Constitutional: Negative.    HENT: Negative.    Eyes: Negative.    Respiratory: Negative.    Endocrine: Negative.    Genitourinary: Positive for urgency.   Musculoskeletal: Negative.    Allergic/Immunologic: Negative.    Neurological: Negative.    Hematological: Negative.    Psychiatric/Behavioral: Negative.        Objective:      Physical Exam   Constitutional: She appears well-developed.   HENT:   Head: Normocephalic.   Cardiovascular: Normal rate.    Pulmonary/Chest: Effort normal.   Abdominal: Soft.   Musculoskeletal: Normal range of motion.   Neurological: She is alert.   Skin: Skin is warm.     Patient has bilateral lower leg edema       Assessment:       1. Bilateral hydronephrosis    2. Acute cystitis without hematuria        Plan:       Ladonna was seen today for hydronephrosis.    Diagnoses and all orders for this visit:    Bilateral hydronephrosis  -     US Retroperitoneal Complete (Kidney and; Future  -     NM Renogram With Lasix; Future    Acute cystitis without hematuria  -     Urine culture    Other orders  -     amoxicillin-clavulanate 875-125mg (AUGMENTIN) 875-125 mg per tablet; Take 1 " tablet by mouth every 12 (twelve) hours.        will place a Castro catheter and repeat a creatinine on Monday and then get a renal ultrasound and Lasix renal scan to see if her hydronephrosis improves.  I am concerned she may have a sensory and toward motor deficit neurogenic bladder.  We discussed the pros and cons of clean intermittent catheterization and I will hold off on that for the time being.  I suspect that her obstruction is at the level of the bladder. We discussed CIC but I would like to hold off on that for the time being.  She may need simple urodynamics in the future      Patient is status post removal of urethral caruncle and I had difficulty getting a Castro catheter in place but this was successfully placed. Her urine is grossly infected.  I am going to get a urine culture and sensitivity and start her on some antibiotics.  Eventually she will need cystoscopy

## 2019-11-20 NOTE — LETTER
November 20, 2019      José Manuel Torres MD  [address]  [facility] [address]           [facility]  [address] [facility] [address]  Phone: [phone]          Patient: Ladonna Hunter   MR Number: [id number]   YOB: 1965   Date of Visit: 11/20/2019       Dear Dr. José Manuel Torres:    Thank you for referring Ladonna Hunter to me for evaluation. Attached you will find relevant portions of my assessment and plan of care.    If you have questions, please do not hesitate to call me. I look forward to following Ladonna Hunter along with you.    Sincerely,    Stephan Tsai Jr., MD    Enclosure  CC:  No Recipients    If you would like to receive this communication electronically, please contact [email or web address] or [phone] to request more information on [facility] access.    For providers and/or their staff who would like to refer a patient to [facility], please contact us through our one-stop-shop provider referral line, [facility], at [phone].    If you feel you have received this communication in error or would no longer like to receive these types of communications, please e-mail [email or web address]

## 2019-11-22 LAB — BACTERIA UR CULT: ABNORMAL

## 2019-11-25 ENCOUNTER — TELEPHONE (OUTPATIENT)
Dept: UROLOGY | Facility: CLINIC | Age: 54
End: 2019-11-25

## 2019-11-25 ENCOUNTER — LAB VISIT (OUTPATIENT)
Dept: LAB | Facility: HOSPITAL | Age: 54
End: 2019-11-25
Attending: UROLOGY
Payer: COMMERCIAL

## 2019-11-25 DIAGNOSIS — E11.9 TYPE 2 DIABETES MELLITUS WITHOUT COMPLICATION, WITHOUT LONG-TERM CURRENT USE OF INSULIN: ICD-10-CM

## 2019-11-25 DIAGNOSIS — N13.30 BILATERAL HYDRONEPHROSIS: ICD-10-CM

## 2019-11-25 LAB
ANION GAP SERPL CALC-SCNC: 5 MMOL/L (ref 8–16)
BUN SERPL-MCNC: 17 MG/DL (ref 6–20)
CALCIUM SERPL-MCNC: 8.6 MG/DL (ref 8.7–10.5)
CHLORIDE SERPL-SCNC: 103 MMOL/L (ref 95–110)
CO2 SERPL-SCNC: 28 MMOL/L (ref 23–29)
CREAT SERPL-MCNC: 1.4 MG/DL (ref 0.5–1.4)
EST. GFR  (AFRICAN AMERICAN): 49 ML/MIN/1.73 M^2
EST. GFR  (NON AFRICAN AMERICAN): 43 ML/MIN/1.73 M^2
GLUCOSE SERPL-MCNC: 455 MG/DL (ref 70–110)
POTASSIUM SERPL-SCNC: 4 MMOL/L (ref 3.5–5.1)
SODIUM SERPL-SCNC: 136 MMOL/L (ref 136–145)

## 2019-11-25 PROCEDURE — 36415 COLL VENOUS BLD VENIPUNCTURE: CPT

## 2019-11-25 PROCEDURE — 80048 BASIC METABOLIC PNL TOTAL CA: CPT

## 2019-11-25 RX ORDER — GLIMEPIRIDE 4 MG/1
TABLET ORAL
Qty: 60 TABLET | Refills: 5 | Status: SHIPPED | OUTPATIENT
Start: 2019-11-25 | End: 2020-04-29 | Stop reason: SDUPTHER

## 2019-11-25 NOTE — TELEPHONE ENCOUNTER
Pt called and states she had not taken her meds. She just took them and her sugar is down to 300. I told pt she still needs to go in because 300 is not acceptable. She states she will go. She is not home now but will go asap

## 2019-11-25 NOTE — TELEPHONE ENCOUNTER
[facility] called with blood glucose 455. Dr rojas called in his cell phone. He advised- send pt to er. Elier on pt cell to call asap when message is received.

## 2019-11-25 NOTE — TELEPHONE ENCOUNTER
Incoming call from lab at [facility]. Critical value. Blood glucose value 455. Dr rojas called and notified. He requested pt be sent to emergency room. I attempted to call pt. Elier on  for pt to call me at the office as soon as she received the message

## 2019-11-26 ENCOUNTER — HOSPITAL ENCOUNTER (OUTPATIENT)
Dept: RADIOLOGY | Facility: HOSPITAL | Age: 54
Discharge: HOME | End: 2019-11-26
Attending: UROLOGY
Payer: COMMERCIAL

## 2019-11-26 ENCOUNTER — TELEPHONE (OUTPATIENT)
Dept: UROLOGY | Facility: CLINIC | Age: 54
End: 2019-11-26

## 2019-11-26 VITALS — BODY MASS INDEX: 34.11 KG/M2 | WEIGHT: 205 LBS

## 2019-11-26 DIAGNOSIS — N13.30 BILATERAL HYDRONEPHROSIS: ICD-10-CM

## 2019-11-26 PROCEDURE — 76770 US EXAM ABDO BACK WALL COMP: CPT | Mod: TC

## 2019-11-26 PROCEDURE — 78708 NM RENOGRAM WITH LASIX: ICD-10-PCS | Mod: 26,,, | Performed by: RADIOLOGY

## 2019-11-26 PROCEDURE — 78708 K FLOW/FUNCT IMAGE W/DRUG: CPT | Mod: 26,,, | Performed by: RADIOLOGY

## 2019-11-26 PROCEDURE — 76770 US RETROPERITONEAL COMPLETE: ICD-10-PCS | Mod: 26,,, | Performed by: RADIOLOGY

## 2019-11-26 PROCEDURE — 76770 US EXAM ABDO BACK WALL COMP: CPT | Mod: 26,,, | Performed by: RADIOLOGY

## 2019-11-26 PROCEDURE — 63600175 PHARM REV CODE 636 W HCPCS: Performed by: UROLOGY

## 2019-11-26 PROCEDURE — A9562 TC99M MERTIATIDE: HCPCS

## 2019-11-26 RX ORDER — FUROSEMIDE 10 MG/ML
20 INJECTION INTRAMUSCULAR; INTRAVENOUS ONCE
Status: COMPLETED | OUTPATIENT
Start: 2019-11-26 | End: 2019-11-26

## 2019-11-26 RX ADMIN — FUROSEMIDE 20 MG: 10 INJECTION, SOLUTION INTRAMUSCULAR; INTRAVENOUS at 10:11

## 2019-11-26 NOTE — TELEPHONE ENCOUNTER
----- Message from Stephan Tsai Jr., MD sent at 11/26/2019  1:58 PM CST -----  No obstruction on lasix scan

## 2019-11-26 NOTE — TELEPHONE ENCOUNTER
Pt notified and appt made for castanon change. Pt did not go to er yesterday because she states her blood sugar came down. P/dr crystal she should see pcp and get dm under control before we remove castanon. Pt notified

## 2019-12-02 ENCOUNTER — PATIENT OUTREACH (OUTPATIENT)
Dept: ADMINISTRATIVE | Facility: OTHER | Age: 54
End: 2019-12-02

## 2019-12-03 ENCOUNTER — PROCEDURE VISIT (OUTPATIENT)
Dept: NEUROLOGY | Facility: CLINIC | Age: 54
End: 2019-12-03
Payer: COMMERCIAL

## 2019-12-03 DIAGNOSIS — M54.50 LOW BACK PAIN, NON-SPECIFIC: ICD-10-CM

## 2019-12-03 DIAGNOSIS — R20.0 LEG NUMBNESS: ICD-10-CM

## 2019-12-03 DIAGNOSIS — M48.062 SPINAL STENOSIS OF LUMBAR REGION WITH NEUROGENIC CLAUDICATION: ICD-10-CM

## 2019-12-03 DIAGNOSIS — E11.42 DIABETIC POLYNEUROPATHY ASSOCIATED WITH TYPE 2 DIABETES MELLITUS: Primary | ICD-10-CM

## 2019-12-03 DIAGNOSIS — R26.9 ABNORMAL GAIT: ICD-10-CM

## 2019-12-03 DIAGNOSIS — G56.03 BILATERAL CARPAL TUNNEL SYNDROME: ICD-10-CM

## 2019-12-03 DIAGNOSIS — R29.898 WEAKNESS OF BOTH LOWER EXTREMITIES: ICD-10-CM

## 2019-12-03 PROCEDURE — 95913 NRV CNDJ TEST 13/> STUDIES: CPT | Mod: S$GLB,,, | Performed by: PSYCHIATRY & NEUROLOGY

## 2019-12-03 PROCEDURE — 95886 PR EMG COMPLETE, W/ NERVE CONDUCTION STUDIES, 5+ MUSCLES: ICD-10-PCS | Mod: S$GLB,,, | Performed by: PSYCHIATRY & NEUROLOGY

## 2019-12-03 PROCEDURE — 95913 PR NERVE CONDUCTION STUDY; 13 OR MORE STUDIES: ICD-10-PCS | Mod: S$GLB,,, | Performed by: PSYCHIATRY & NEUROLOGY

## 2019-12-03 PROCEDURE — 95886 MUSC TEST DONE W/N TEST COMP: CPT | Mod: S$GLB,,, | Performed by: PSYCHIATRY & NEUROLOGY

## 2019-12-03 NOTE — PROCEDURES
EMG W/ ULTRASOUND AND NERVE CONDUCTION TEST 4 Extremities  Date/Time: 12/3/2019 4:00 PM  Performed by: José Manuel Torres MD  Authorized by: José Manuel Torres MD       REPORT OF EMG and NERVE CONDUCTION STUDY    Name: Ladonna Hunter  Date of Study: 12/3/19   Referring Physician:  Melissa  Test Performed by:  MD Melissa  Full Values Attached  Informed Consent Scanned.   No anesthesia used.   Amount of Blood Loss: none. The patient tolerated this procedure well.       Informed consent was obtained prior to performing this study. Two patient identifiers were confirmed with the patient prior to performing this study. A time out to determine correct patient and and agreement on procedure performed was conducted prior to the concentric needle examination.    Reason for the study:  Numb feet and hands, weakness in the legs.       Findings:   Nerve conduction studies of the right median (motor and sensory)nerves, right ulnar (motor and sensory) nerves, right radial sensory nerve, left medial sensory nerve, bilateral peroneal motor, bilateral tibial motor, bilateral sural nerves and bilateral H-reflexes were performed.   Amplitudes were reduced throughout without conduction block. SNAPs and some CMAPs were absent in the legs.In the arms, median palm to wrist latency was prolonged to 3.0ms on the right and 2.7ms on the left.  Othrwise, distal latencies, conduction velocities, and F-waves were better preserved. H reflexes absent  EMG of selected muscles of the bilateral arms bilateral legs were performed as indicated on the attached sheets.  This portion of the study was limited due to patient's chronic edema and difficulty with position for paraspinal muscle sampling due to her indwelling catheter. There was decreased recruitment distally in the legs more than hands    Impression:  Abnormal Study secondary to the Presence of:      1. 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 Mild Carpal Tunnel Syndrome Bilaterally   3. This study was insufficient to evaluate for radiculopathy which is expected at L1 based on her symptoms and MRI findings      José Manuel Torres M.D. [facility].     Recommendations (discussed at this visit):    1. Needs better DM2 control to prevent worsening neuropathy  2. She is pending neurosurgery evaluation for her upper L spine disc disease  3. Suggested Brace PRN nightly for CTS  4. She is seeing urology for hydronephrosis/ possible neurogenic bladder which could be related to her DM neuropathy and also lumbar myelopathy as reviewed    RTC given

## 2019-12-09 ENCOUNTER — PATIENT MESSAGE (OUTPATIENT)
Dept: FAMILY MEDICINE | Facility: CLINIC | Age: 54
End: 2019-12-09

## 2019-12-11 ENCOUNTER — TELEPHONE (OUTPATIENT)
Dept: FAMILY MEDICINE | Facility: CLINIC | Age: 54
End: 2019-12-11

## 2019-12-11 NOTE — TELEPHONE ENCOUNTER
----- Message from Fadi Houston sent at 2019  3:18 PM CST -----  Contact: Patient  Ladonna Hunter  MRN: [id number]  : 1965  PCP: Mayra Islas  Home Phone      [phone]  Work Phone      Not on file.  Mobile          [phone]      MESSAGE: called for status on [employer] license paperwork    Call [phone]    PCP: Roshan

## 2019-12-17 ENCOUNTER — PATIENT OUTREACH (OUTPATIENT)
Dept: ADMINISTRATIVE | Facility: OTHER | Age: 54
End: 2019-12-17

## 2019-12-18 ENCOUNTER — TELEPHONE (OUTPATIENT)
Dept: FAMILY MEDICINE | Facility: CLINIC | Age: 54
End: 2019-12-18

## 2019-12-18 ENCOUNTER — HOSPITAL ENCOUNTER (EMERGENCY)
Facility: HOSPITAL | Age: 54
Discharge: HOME | End: 2019-12-18
Attending: SURGERY
Payer: COMMERCIAL

## 2019-12-18 ENCOUNTER — OFFICE VISIT (OUTPATIENT)
Dept: UROLOGY | Facility: CLINIC | Age: 54
End: 2019-12-18
Attending: UROLOGY
Payer: COMMERCIAL

## 2019-12-18 VITALS
HEART RATE: 70 BPM | WEIGHT: 205 LBS | BODY MASS INDEX: 34.11 KG/M2 | DIASTOLIC BLOOD PRESSURE: 78 MMHG | RESPIRATION RATE: 20 BRPM | TEMPERATURE: 96 F | OXYGEN SATURATION: 97 % | SYSTOLIC BLOOD PRESSURE: 189 MMHG

## 2019-12-18 VITALS — RESPIRATION RATE: 16 BRPM | WEIGHT: 205 LBS | HEART RATE: 68 BPM | BODY MASS INDEX: 34.16 KG/M2 | HEIGHT: 65 IN

## 2019-12-18 DIAGNOSIS — N13.30 BILATERAL HYDRONEPHROSIS: Primary | ICD-10-CM

## 2019-12-18 DIAGNOSIS — Z46.6 URINARY CATHETER (FOLEY) CHANGE REQUIRED: ICD-10-CM

## 2019-12-18 DIAGNOSIS — N39.0 URINARY TRACT INFECTION WITH HEMATURIA, SITE UNSPECIFIED: ICD-10-CM

## 2019-12-18 DIAGNOSIS — R31.9 URINARY TRACT INFECTION WITH HEMATURIA, SITE UNSPECIFIED: ICD-10-CM

## 2019-12-18 DIAGNOSIS — I10 HTN (HYPERTENSION): Primary | ICD-10-CM

## 2019-12-18 DIAGNOSIS — R79.89 ELEVATED BRAIN NATRIURETIC PEPTIDE (BNP) LEVEL: ICD-10-CM

## 2019-12-18 DIAGNOSIS — N31.9 NEUROGENIC BLADDER: ICD-10-CM

## 2019-12-18 DIAGNOSIS — R51.9 ACUTE NONINTRACTABLE HEADACHE, UNSPECIFIED HEADACHE TYPE: ICD-10-CM

## 2019-12-18 LAB
ALBUMIN SERPL BCP-MCNC: 2 G/DL (ref 3.5–5.2)
ALP SERPL-CCNC: 56 U/L (ref 55–135)
ALT SERPL W/O P-5'-P-CCNC: 9 U/L (ref 10–44)
ANION GAP SERPL CALC-SCNC: 8 MMOL/L (ref 8–16)
AST SERPL-CCNC: 11 U/L (ref 10–40)
BACTERIA #/AREA URNS HPF: ABNORMAL /HPF
BASOPHILS # BLD AUTO: 0.03 K/UL (ref 0–0.2)
BASOPHILS NFR BLD: 0.5 % (ref 0–1.9)
BILIRUB SERPL-MCNC: 0.3 MG/DL (ref 0.1–1)
BILIRUB UR QL STRIP: NEGATIVE
BNP SERPL-MCNC: 523 PG/ML (ref 0–99)
BUN SERPL-MCNC: 23 MG/DL (ref 6–20)
CALCIUM SERPL-MCNC: 9 MG/DL (ref 8.7–10.5)
CHLORIDE SERPL-SCNC: 106 MMOL/L (ref 95–110)
CK MB SERPL-MCNC: 2.6 NG/ML (ref 0.1–6.5)
CK MB SERPL-RTO: 2.1 % (ref 0–5)
CK SERPL-CCNC: 121 U/L (ref 20–180)
CK SERPL-CCNC: 121 U/L (ref 20–180)
CLARITY UR: ABNORMAL
CO2 SERPL-SCNC: 30 MMOL/L (ref 23–29)
COLOR UR: YELLOW
CREAT SERPL-MCNC: 1 MG/DL (ref 0.5–1.4)
DIFFERENTIAL METHOD: ABNORMAL
EOSINOPHIL # BLD AUTO: 0.1 K/UL (ref 0–0.5)
EOSINOPHIL NFR BLD: 1.6 % (ref 0–8)
ERYTHROCYTE [DISTWIDTH] IN BLOOD BY AUTOMATED COUNT: 15.9 % (ref 11.5–14.5)
EST. GFR  (AFRICAN AMERICAN): >60 ML/MIN/1.73 M^2
EST. GFR  (NON AFRICAN AMERICAN): >60 ML/MIN/1.73 M^2
GLUCOSE SERPL-MCNC: 240 MG/DL (ref 70–110)
GLUCOSE UR QL STRIP: ABNORMAL
HCT VFR BLD AUTO: 38.3 % (ref 37–48.5)
HGB BLD-MCNC: 11.2 G/DL (ref 12–16)
HGB UR QL STRIP: ABNORMAL
HYALINE CASTS #/AREA URNS LPF: 0 /LPF
IMM GRANULOCYTES # BLD AUTO: 0.03 K/UL (ref 0–0.04)
IMM GRANULOCYTES NFR BLD AUTO: 0.5 % (ref 0–0.5)
KETONES UR QL STRIP: NEGATIVE
LEUKOCYTE ESTERASE UR QL STRIP: ABNORMAL
LYMPHOCYTES # BLD AUTO: 1.7 K/UL (ref 1–4.8)
LYMPHOCYTES NFR BLD: 27.2 % (ref 18–48)
MCH RBC QN AUTO: 23 PG (ref 27–31)
MCHC RBC AUTO-ENTMCNC: 29.2 G/DL (ref 32–36)
MCV RBC AUTO: 79 FL (ref 82–98)
MICROSCOPIC COMMENT: ABNORMAL
MONOCYTES # BLD AUTO: 0.3 K/UL (ref 0.3–1)
MONOCYTES NFR BLD: 5.4 % (ref 4–15)
NEUTROPHILS # BLD AUTO: 4.1 K/UL (ref 1.8–7.7)
NEUTROPHILS NFR BLD: 64.8 % (ref 38–73)
NITRITE UR QL STRIP: NEGATIVE
NRBC BLD-RTO: 0 /100 WBC
PH UR STRIP: 7 [PH] (ref 5–8)
PLATELET # BLD AUTO: 236 K/UL (ref 150–350)
PMV BLD AUTO: 9.7 FL (ref 9.2–12.9)
POTASSIUM SERPL-SCNC: 3.6 MMOL/L (ref 3.5–5.1)
PROT SERPL-MCNC: 6.1 G/DL (ref 6–8.4)
PROT UR QL STRIP: ABNORMAL
RBC # BLD AUTO: 4.88 M/UL (ref 4–5.4)
RBC #/AREA URNS HPF: >100 /HPF (ref 0–4)
SODIUM SERPL-SCNC: 144 MMOL/L (ref 136–145)
SP GR UR STRIP: 1.02 (ref 1–1.03)
SQUAMOUS #/AREA URNS HPF: >100 /HPF
TROPONIN I SERPL DL<=0.01 NG/ML-MCNC: 0.01 NG/ML (ref 0–0.03)
URN SPEC COLLECT METH UR: ABNORMAL
UROBILINOGEN UR STRIP-ACNC: NEGATIVE EU/DL
WBC # BLD AUTO: 6.32 K/UL (ref 3.9–12.7)
WBC #/AREA URNS HPF: >100 /HPF (ref 0–5)

## 2019-12-18 PROCEDURE — 99999 PR PBB SHADOW E&M-EST. PATIENT-LVL III: CPT | Mod: PBBFAC,,, | Performed by: UROLOGY

## 2019-12-18 PROCEDURE — 84484 ASSAY OF TROPONIN QUANT: CPT

## 2019-12-18 PROCEDURE — 87086 URINE CULTURE/COLONY COUNT: CPT

## 2019-12-18 PROCEDURE — 80053 COMPREHEN METABOLIC PANEL: CPT

## 2019-12-18 PROCEDURE — 87077 CULTURE AEROBIC IDENTIFY: CPT

## 2019-12-18 PROCEDURE — 81000 URINALYSIS NONAUTO W/SCOPE: CPT

## 2019-12-18 PROCEDURE — 25000003 PHARM REV CODE 250: Performed by: NURSE PRACTITIONER

## 2019-12-18 PROCEDURE — 93010 EKG 12-LEAD: ICD-10-PCS | Mod: ,,, | Performed by: INTERNAL MEDICINE

## 2019-12-18 PROCEDURE — 93005 ELECTROCARDIOGRAM TRACING: CPT

## 2019-12-18 PROCEDURE — 87186 SC STD MICRODIL/AGAR DIL: CPT

## 2019-12-18 PROCEDURE — 87088 URINE BACTERIA CULTURE: CPT

## 2019-12-18 PROCEDURE — 82550 ASSAY OF CK (CPK): CPT

## 2019-12-18 PROCEDURE — 85025 COMPLETE CBC W/AUTO DIFF WBC: CPT

## 2019-12-18 PROCEDURE — 99999 PR PBB SHADOW E&M-EST. PATIENT-LVL III: ICD-10-PCS | Mod: PBBFAC,,, | Performed by: UROLOGY

## 2019-12-18 PROCEDURE — 93010 ELECTROCARDIOGRAM REPORT: CPT | Mod: ,,, | Performed by: INTERNAL MEDICINE

## 2019-12-18 PROCEDURE — 82553 CREATINE MB FRACTION: CPT

## 2019-12-18 PROCEDURE — 83880 ASSAY OF NATRIURETIC PEPTIDE: CPT

## 2019-12-18 PROCEDURE — 99499 NO LOS: ICD-10-PCS | Mod: S$GLB,,, | Performed by: UROLOGY

## 2019-12-18 PROCEDURE — 99284 EMERGENCY DEPT VISIT MOD MDM: CPT | Mod: 25

## 2019-12-18 PROCEDURE — 36415 COLL VENOUS BLD VENIPUNCTURE: CPT

## 2019-12-18 PROCEDURE — 99499 UNLISTED E&M SERVICE: CPT | Mod: S$GLB,,, | Performed by: UROLOGY

## 2019-12-18 RX ORDER — ACETAMINOPHEN 325 MG/1
650 TABLET ORAL
Status: COMPLETED | OUTPATIENT
Start: 2019-12-18 | End: 2019-12-18

## 2019-12-18 RX ORDER — CLONIDINE HYDROCHLORIDE 0.1 MG/1
0.2 TABLET ORAL
Status: COMPLETED | OUTPATIENT
Start: 2019-12-18 | End: 2019-12-18

## 2019-12-18 RX ORDER — NITROFURANTOIN 25; 75 MG/1; MG/1
100 CAPSULE ORAL 2 TIMES DAILY
Qty: 14 CAPSULE | Refills: 0 | Status: SHIPPED | OUTPATIENT
Start: 2019-12-18 | End: 2019-12-20

## 2019-12-18 RX ORDER — LOSARTAN POTASSIUM 50 MG/1
100 TABLET ORAL DAILY
Status: DISCONTINUED | OUTPATIENT
Start: 2019-12-18 | End: 2019-12-18 | Stop reason: HOSPADM

## 2019-12-18 RX ORDER — HYDROCHLOROTHIAZIDE 12.5 MG/1
12.5 TABLET ORAL
Status: COMPLETED | OUTPATIENT
Start: 2019-12-18 | End: 2019-12-18

## 2019-12-18 RX ADMIN — ACETAMINOPHEN 650 MG: 325 TABLET ORAL at 10:12

## 2019-12-18 RX ADMIN — HYDROCHLOROTHIAZIDE 12.5 MG: 12.5 TABLET ORAL at 12:12

## 2019-12-18 RX ADMIN — CLONIDINE HYDROCHLORIDE 0.2 MG: 0.1 TABLET ORAL at 10:12

## 2019-12-18 RX ADMIN — LOSARTAN POTASSIUM 100 MG: 50 TABLET, FILM COATED ORAL at 12:12

## 2019-12-18 NOTE — PROGRESS NOTES
Pt presents for castanon change, but bp elevated- 208/96 . Pt sent to er p/ dr rojas and brought by wheelchair by bailee navarro lpn. Report called by me to Alanna who was identified as er nurse.pt will have castanon changed in er after eval of bp       as per nurse's note patient was sent to the emergency room to evaluate hypertension.  Hopefully she can have her Castanon catheter change there.  If not we will changes here.  Eventually she will need cystoscopy in simple urodynamics

## 2019-12-18 NOTE — TELEPHONE ENCOUNTER
----- Message from Margi Bettencourt sent at 2019  8:49 AM CST -----  Contact: Self  Ladonna Hunter  MRN: [id number]  : 1965  PCP: Mayra Islas  Home Phone      [phone]  Work Phone      Not on file.  Mobile          [phone]      MESSAGE: Patient is calling for the status of the paper that she dropped off last Thursday, please advise.    Phone:  [phone]

## 2019-12-18 NOTE — ED PROVIDER NOTES
Encounter Date: 2019       History     Chief Complaint   Patient presents with    Hypertension     55 y/o sent from urology clinic d/t HTN.  Patient has a chronic indwelling Castro which gets changed monthly.  She reported to urology for her monthly change, and it was noted that her blood pressure was elevated.  Patient denies chest pain, shortness of breath, abdominal symptoms, urinary symptoms, or URI.  She does report a mild headache.  Denies any head trauma.  Headache is not described as worse headache of life.  Current pain is rated 2/10.  No neurological symptoms.  Patient reports that she is on daily hypertension medicine which she takes each night.  She reports that last night she noted that she was out of her medication.  She plans on picking up her prescription at the pharmacy today.  Has a follow-up appointment with her primary care doctor in 2 days.  Urology clinic requesting that we change Castro while patient is here.    The history is provided by the patient.     Review of patient's allergies indicates:  No Known Allergies  Past Medical History:   Diagnosis Date    Anemia     CHF (congestive heart failure)     Diabetes mellitus, type 2     Hypertension     Thyroid disease      Past Surgical History:   Procedure Laterality Date     SECTION      CHOLECYSTECTOMY      COLONOSCOPY N/A 2019    Procedure: COLONOSCOPY WITH BIOPSY;  Surgeon: Yoseph Mendez MD;  Location: [facility];  Service: Colon and Rectal;  Laterality: N/A;    COLONOSCOPY N/A 10/29/2019    Procedure: COLONOSCOPY;  Surgeon: Yoseph Mendez MD;  [facility]);  Service: Colon and Rectal;  Laterality: N/A;    TONSILLECTOMY      URETHRA SURGERY       Family History   Problem Relation Age of Onset    Diverticulitis Mother     Diabetes Mother     Heart disease Father      Social History     Tobacco Use    Smoking status: Never Smoker    Smokeless tobacco: Never Used   Substance Use Topics     Alcohol use: Yes     Comment: socially once every 3 years    Drug use: No     Review of Systems   Constitutional: Negative for fever.   HENT: Negative for congestion, ear pain, rhinorrhea, sore throat and trouble swallowing.    Eyes: Negative for pain and redness.   Respiratory: Negative for cough and shortness of breath.    Cardiovascular: Negative for chest pain.   Gastrointestinal: Negative for abdominal pain.   Genitourinary: Positive for difficulty urinating (Catheter in place).   Musculoskeletal: Negative for arthralgias, back pain, myalgias and neck pain.   Skin: Negative for rash and wound.   Neurological: Positive for headaches. Negative for seizures and weakness.   Psychiatric/Behavioral: Negative.        Physical Exam     Initial Vitals [12/18/19 1032]   BP Pulse Resp Temp SpO2   (!) 231/99 70 20 96.4 °F (35.8 °C) 97 %      MAP       --         Physical Exam    Nursing note and vitals reviewed.  Constitutional: No distress.   HENT:   Head: Normocephalic and atraumatic.   Right Ear: External ear normal.   Left Ear: External ear normal.   Nose: Nose normal.   Mouth/Throat: Oropharynx is clear and moist and mucous membranes are normal.   Eyes: Conjunctivae, EOM and lids are normal. Pupils are equal, round, and reactive to light.   Neck: Neck supple.   Cardiovascular: Normal rate, regular rhythm, S1 normal, S2 normal, normal heart sounds and intact distal pulses.   Pulmonary/Chest: Effort normal and breath sounds normal. No respiratory distress.   Abdominal: Soft. Bowel sounds are normal. There is no tenderness.   Genitourinary:   Genitourinary Comments: Castro in place.   Musculoskeletal: Normal range of motion.   Neurological: She is alert and oriented to person, place, and time. She has normal strength. No cranial nerve deficit or sensory deficit. GCS eye subscore is 4. GCS verbal subscore is 5. GCS motor subscore is 6.   Skin: Skin is warm and dry. Capillary refill takes less than 2 seconds. No rash  noted.   Psychiatric: She has a normal mood and affect. Her speech is normal and behavior is normal.         ED Course   Procedures  Labs Reviewed   CBC W/ AUTO DIFFERENTIAL - Abnormal; Notable for the following components:       Result Value    Hemoglobin 11.2 (*)     Mean Corpuscular Volume 79 (*)     Mean Corpuscular Hemoglobin 23.0 (*)     Mean Corpuscular Hemoglobin Conc 29.2 (*)     RDW 15.9 (*)     All other components within normal limits   COMPREHENSIVE METABOLIC PANEL - Abnormal; Notable for the following components:    CO2 30 (*)     Glucose 240 (*)     BUN, Bld 23 (*)     Albumin 2.0 (*)     ALT 9 (*)     All other components within normal limits   URINALYSIS, REFLEX TO URINE CULTURE - Abnormal; Notable for the following components:    Appearance, UA Cloudy (*)     Protein, UA 3+ (*)     Glucose, UA 2+ (*)     Occult Blood UA 3+ (*)     Leukocytes, UA Trace (*)     All other components within normal limits    Narrative:     Preferred Collection Type->Urine, Clean Catch   B-TYPE NATRIURETIC PEPTIDE - Abnormal; Notable for the following components:     (*)     All other components within normal limits   URINALYSIS MICROSCOPIC - Abnormal; Notable for the following components:    RBC, UA >100 (*)     WBC, UA >100 (*)     Bacteria Many (*)     All other components within normal limits    Narrative:     Preferred Collection Type->Urine, Clean Catch   CULTURE, URINE   CK-MB   CK   TROPONIN I     EKG Readings: (Independently Interpreted)   EKG read with MD at the time it was performed. EKG without concerning findings.      ECG Results          EKG 12-lead (In process)  Result time 12/18/19 11:17:45    In process by Interface, Lab In [facility] (12/18/19 11:17:45)                 Narrative:    Test Reason : I10    Vent. Rate : 062 BPM     Atrial Rate : 062 BPM     P-R Int : 172 ms          QRS Dur : 098 ms      QT Int : 456 ms       P-R-T Axes : 058 072 072 degrees     QTc Int : 462 ms    Normal sinus  rhythm  Cannot rule out Anterior infarct ,age undetermined  Abnormal ECG  When compared with ECG of 27-AUG-2018 15:11,  Questionable change in The axis  T wave amplitude has decreased in Lateral leads    Referred By: [email or web address]   SELF           Confirmed By:                             Imaging Results          X-Ray Chest PA And Lateral (Final result)  Result time 12/18/19 12:32:14    Final result by Jong Trejo MD (12/18/19 12:32:14)                 Impression:      As above..      Electronically signed by: Jong Trejo MD  Date:    12/18/2019  Time:    12:32             Narrative:    EXAMINATION:  XR CHEST PA AND LATERAL    CLINICAL HISTORY:  .  Essential (primary) hypertension    TECHNIQUE:  Chest with lateral dated December 18, 2019.    COMPARISON:  August 27, 2018.    FINDINGS:  The cardiac silhouette is within normal limits.    Prominence of the perihilar lung markings and peribronchial cuffing with minimal thickening along the fissures.  No focal consolidation or effusion.    No acute osseous abnormality.                               CT Head Without Contrast (Final result)  Result time 12/18/19 11:35:57    Final result by Jong Trejo MD (12/18/19 11:35:57)                 Impression:      No CT evidence of an acute intracranial abnormality.      Electronically signed by: Jong Trejo MD  Date:    12/18/2019  Time:    11:35             Narrative:    EXAMINATION:  CT HEAD WITHOUT CONTRAST    CLINICAL HISTORY:  Headache, acute, norm neuro exam;    TECHNIQUE:  Axial images were obtained through the head.  Coronal and sagittal reformations were also performed.  Contrast was not administered.    COMPARISON:  July 21, 2014 and MRI examination of November 8, 2019.    FINDINGS:  CT head without contrast dated December 18, 2019.    There is no CT evidence of an acute intracranial abnormality.  No evidence of acute infarct, hemorrhage, mass or midline shift.    No displaced skull fracture.  No  visualized significant paranasal sinus disease.                                           Medications   losartan tablet 100 mg (100 mg Oral Given 12/18/19 1230)   cloNIDine tablet 0.2 mg (0.2 mg Oral Given 12/18/19 1052)   acetaminophen tablet 650 mg (650 mg Oral Given 12/18/19 1052)   hydroCHLOROthiazide tablet 12.5 mg (12.5 mg Oral Given 12/18/19 1230)               --Plan of care discussed with collaborating provider. Agrees with plan of care today.                Clinical Impression:       ICD-10-CM ICD-9-CM   1. HTN (hypertension) I10 401.9   2. Elevated brain natriuretic peptide (BNP) level R79.89 790.99   3. Urinary tract infection with hematuria, site unspecified N39.0 599.0    R31.9 599.70   4. Urinary catheter (Castro) change required Z46.6 V53.6   5. Acute nonintractable headache, unspecified headache type R51 784.0     New Prescriptions    NITROFURANTOIN, MACROCRYSTAL-MONOHYDRATE, (MACROBID) 100 MG CAPSULE    Take 1 capsule (100 mg total) by mouth 2 (two) times daily. for 7 days       Disposition:   Disposition: Discharged  Condition: Stable    The patient acknowledges that close follow up with medical provider is required. Instructed to follow up with PCP within 2 days. Patient was given specific return precautions. The patient agrees to comply with all instruction and directions given in the ER.                      Alana Major NP  12/18/19 [id number]

## 2019-12-18 NOTE — DISCHARGE INSTRUCTIONS
**Follow up with PCP in 24-48 hours for blood pressure medication refills and management. Return to ER with worsening of symptoms.     **Over the counter tylenol or motrin as needed for pain and/or fever as directed on package insert. Drink plenty fluids. Get plenty rest. Wash hands frequently.     **Our goal in the emergency department is to always give you outstanding care and exceptional service. You may receive a survey by mail or e-mail in the next week regarding your experience in our ED. We would greatly appreciate your completing and returning the survey. Your feedback provides us with a way to recognize our staff who give very good care and it helps us learn how to improve when your experience was below our aspiration of excellence.

## 2019-12-20 ENCOUNTER — OFFICE VISIT (OUTPATIENT)
Dept: FAMILY MEDICINE | Facility: CLINIC | Age: 54
End: 2019-12-20
Payer: COMMERCIAL

## 2019-12-20 VITALS
WEIGHT: 208.56 LBS | HEIGHT: 65 IN | SYSTOLIC BLOOD PRESSURE: 140 MMHG | BODY MASS INDEX: 34.75 KG/M2 | RESPIRATION RATE: 16 BRPM | DIASTOLIC BLOOD PRESSURE: 80 MMHG | HEART RATE: 64 BPM

## 2019-12-20 DIAGNOSIS — L97.909 VENOUS STASIS ULCER, UNSPECIFIED SITE, UNSPECIFIED ULCER STAGE, UNSPECIFIED WHETHER VARICOSE VEINS PRESENT: ICD-10-CM

## 2019-12-20 DIAGNOSIS — I83.009 VENOUS STASIS ULCER, UNSPECIFIED SITE, UNSPECIFIED ULCER STAGE, UNSPECIFIED WHETHER VARICOSE VEINS PRESENT: ICD-10-CM

## 2019-12-20 DIAGNOSIS — E11.65 UNCONTROLLED TYPE 2 DIABETES MELLITUS WITH HYPERGLYCEMIA: ICD-10-CM

## 2019-12-20 DIAGNOSIS — E03.9 HYPOTHYROIDISM, UNSPECIFIED TYPE: Primary | ICD-10-CM

## 2019-12-20 LAB — BACTERIA UR CULT: ABNORMAL

## 2019-12-20 PROCEDURE — 3008F BODY MASS INDEX DOCD: CPT | Mod: CPTII,S$GLB,, | Performed by: NURSE PRACTITIONER

## 2019-12-20 PROCEDURE — 99213 PR OFFICE/OUTPT VISIT, EST, LEVL III, 20-29 MIN: ICD-10-PCS | Mod: S$GLB,,, | Performed by: NURSE PRACTITIONER

## 2019-12-20 PROCEDURE — 3079F PR MOST RECENT DIASTOLIC BLOOD PRESSURE 80-89 MM HG: ICD-10-PCS | Mod: CPTII,S$GLB,, | Performed by: NURSE PRACTITIONER

## 2019-12-20 PROCEDURE — 3046F PR MOST RECENT HEMOGLOBIN A1C LEVEL > 9.0%: ICD-10-PCS | Mod: CPTII,S$GLB,, | Performed by: NURSE PRACTITIONER

## 2019-12-20 PROCEDURE — 3079F DIAST BP 80-89 MM HG: CPT | Mod: CPTII,S$GLB,, | Performed by: NURSE PRACTITIONER

## 2019-12-20 PROCEDURE — 3077F SYST BP >= 140 MM HG: CPT | Mod: CPTII,S$GLB,, | Performed by: NURSE PRACTITIONER

## 2019-12-20 PROCEDURE — 3046F HEMOGLOBIN A1C LEVEL >9.0%: CPT | Mod: CPTII,S$GLB,, | Performed by: NURSE PRACTITIONER

## 2019-12-20 PROCEDURE — 99999 PR PBB SHADOW E&M-EST. PATIENT-LVL IV: ICD-10-PCS | Mod: PBBFAC,,, | Performed by: NURSE PRACTITIONER

## 2019-12-20 PROCEDURE — 99213 OFFICE O/P EST LOW 20 MIN: CPT | Mod: S$GLB,,, | Performed by: NURSE PRACTITIONER

## 2019-12-20 PROCEDURE — 99999 PR PBB SHADOW E&M-EST. PATIENT-LVL IV: CPT | Mod: PBBFAC,,, | Performed by: NURSE PRACTITIONER

## 2019-12-20 PROCEDURE — 3008F PR BODY MASS INDEX (BMI) DOCUMENTED: ICD-10-PCS | Mod: CPTII,S$GLB,, | Performed by: NURSE PRACTITIONER

## 2019-12-20 PROCEDURE — 3077F PR MOST RECENT SYSTOLIC BLOOD PRESSURE >= 140 MM HG: ICD-10-PCS | Mod: CPTII,S$GLB,, | Performed by: NURSE PRACTITIONER

## 2019-12-20 RX ORDER — LEVOFLOXACIN 500 MG/1
500 TABLET, FILM COATED ORAL DAILY
Qty: 7 TABLET | Refills: 0 | Status: SHIPPED | OUTPATIENT
Start: 2019-12-20 | End: 2019-12-27

## 2019-12-20 RX ORDER — MUPIROCIN 20 MG/G
OINTMENT TOPICAL 3 TIMES DAILY
Qty: 30 G | Refills: 1 | Status: ON HOLD | OUTPATIENT
Start: 2019-12-20 | End: 2020-06-04 | Stop reason: HOSPADM

## 2019-12-20 NOTE — PROGRESS NOTES
Subjective:       Patient ID: Ladonna Hunter is a 54 y.o. female.    Chief Complaint: Follow-up (6 week)    Here for recheck of DM. Has ulcerations at the payal lower legs. Has seen Dr. Torres and has spinal stenosis. Blood sugars are stable in the 200's.    Follow-up   Associated symptoms include fatigue. Pertinent negatives include no abdominal pain, arthralgias, congestion, coughing, fever, headaches, myalgias, nausea, sore throat or vomiting.     Review of Systems   Constitutional: Positive for activity change (difficult to ambulate) and fatigue. Negative for appetite change (okay at this time) and fever.   HENT: Negative.  Negative for congestion, ear pain and sore throat.    Eyes: Negative.  Negative for visual disturbance.   Respiratory: Negative.  Negative for cough, shortness of breath and wheezing.    Cardiovascular: Negative.    Gastrointestinal: Positive for diarrhea (better). Negative for abdominal pain, nausea and vomiting.        Leakage is better   Endocrine: Positive for polyuria (better.).        Blood sugars running high in the morning and lower at during the day   Genitourinary: Negative.  Negative for difficulty urinating and urgency.        Indwelling catheter since seen by Dr. Bansal - had an enlarged bladder   Musculoskeletal: Negative.  Negative for arthralgias and myalgias.   Skin: Negative.  Negative for color change.   Allergic/Immunologic: Negative.    Neurological: Negative.  Negative for dizziness and headaches.   Hematological: Negative.    Psychiatric/Behavioral: Positive for dysphoric mood (not crying as much). Negative for sleep disturbance. The patient is nervous/anxious.         Still has her bad days but generally better   All other systems reviewed and are negative.      Objective:      Physical Exam   Constitutional: She is oriented to person, place, and time. She appears well-developed and well-nourished. No distress.   HENT:   Head: Normocephalic and atraumatic.   Eyes:  Pupils are equal, round, and reactive to light.   Conjunctiva pale   Neck: Normal range of motion. Neck supple.   Cardiovascular: Normal rate, regular rhythm and normal heart sounds.   No murmur heard.  Pulmonary/Chest: Effort normal and breath sounds normal. No respiratory distress.   Musculoskeletal: Normal range of motion. She exhibits edema (edema at the lower legs with some stasis dermatitis - worse at the right leg.).   Neurological: She is alert and oriented to person, place, and time.   Skin: Skin is warm and dry. No pallor.   Ulcerations at the paayl lower leg with weeping and redness at the surrounding areas.   Psychiatric: She has a normal mood and affect.   Nursing note and vitals reviewed.      Assessment:       1. Hypothyroidism, unspecified type    2. Venous stasis ulcer, unspecified site, unspecified ulcer stage, unspecified whether varicose veins present    3. Uncontrolled type 2 diabetes mellitus with hyperglycemia        Plan:     Ladonna was seen today for follow-up.    Diagnoses and all orders for this visit:    Hypothyroidism, unspecified type    Venous stasis ulcer, unspecified site, unspecified ulcer stage, unspecified whether varicose veins present  -     levoFLOXacin (LEVAQUIN) 500 MG tablet; Take 1 tablet (500 mg total) by mouth once daily. for 7 days  -     mupirocin (BACTROBAN) 2 % ointment; Apply topically 3 (three) times daily.    Uncontrolled type 2 diabetes mellitus with hyperglycemia    Sees endocrine in about 2 weeks    RTC PRN

## 2020-01-03 ENCOUNTER — PATIENT OUTREACH (OUTPATIENT)
Dept: ADMINISTRATIVE | Facility: OTHER | Age: 55
End: 2020-01-03

## 2020-01-06 ENCOUNTER — PATIENT MESSAGE (OUTPATIENT)
Dept: NEUROSURGERY | Facility: CLINIC | Age: 55
End: 2020-01-06

## 2020-01-06 ENCOUNTER — TELEPHONE (OUTPATIENT)
Dept: NEUROSURGERY | Facility: CLINIC | Age: 55
End: 2020-01-06

## 2020-01-06 ENCOUNTER — OFFICE VISIT (OUTPATIENT)
Dept: NEUROSURGERY | Facility: CLINIC | Age: 55
End: 2020-01-06
Payer: COMMERCIAL

## 2020-01-06 VITALS
DIASTOLIC BLOOD PRESSURE: 84 MMHG | HEIGHT: 65 IN | SYSTOLIC BLOOD PRESSURE: 212 MMHG | TEMPERATURE: 98 F | WEIGHT: 216 LBS | BODY MASS INDEX: 35.99 KG/M2 | HEART RATE: 63 BPM

## 2020-01-06 DIAGNOSIS — M47.12 OSTEOARTHRITIS OF CERVICAL SPINE WITH MYELOPATHY: ICD-10-CM

## 2020-01-06 DIAGNOSIS — M48.02 SPINAL STENOSIS OF CERVICAL REGION: ICD-10-CM

## 2020-01-06 DIAGNOSIS — R29.898 WEAKNESS OF BOTH ARMS: ICD-10-CM

## 2020-01-06 DIAGNOSIS — M48.04 SPINAL STENOSIS OF THORACIC REGION: ICD-10-CM

## 2020-01-06 DIAGNOSIS — R29.898 WEAKNESS OF BOTH LOWER EXTREMITIES: ICD-10-CM

## 2020-01-06 DIAGNOSIS — M47.26 OSTEOARTHRITIS OF SPINE WITH RADICULOPATHY, LUMBAR REGION: ICD-10-CM

## 2020-01-06 PROCEDURE — 99204 PR OFFICE/OUTPT VISIT, NEW, LEVL IV, 45-59 MIN: ICD-10-PCS | Mod: S$GLB,,, | Performed by: NEUROLOGICAL SURGERY

## 2020-01-06 PROCEDURE — 99999 PR PBB SHADOW E&M-EST. PATIENT-LVL III: CPT | Mod: PBBFAC,,, | Performed by: NEUROLOGICAL SURGERY

## 2020-01-06 PROCEDURE — 99204 OFFICE O/P NEW MOD 45 MIN: CPT | Mod: S$GLB,,, | Performed by: NEUROLOGICAL SURGERY

## 2020-01-06 PROCEDURE — 99999 PR PBB SHADOW E&M-EST. PATIENT-LVL III: ICD-10-PCS | Mod: PBBFAC,,, | Performed by: NEUROLOGICAL SURGERY

## 2020-01-06 NOTE — PROGRESS NOTES
History & Physical    SUBJECTIVE:     Chief Complaint:  Bilateral arm and leg weakness.    History of Present Illness:  Ms. Hunter is a 54-year-old female who was referred to me by Dr. Torres.  Her past medical history is significant for anemia, CHF, diabetes, hypertension, and thyroid disease.  She states that she was diagnosed with cervical disc bulges 20 years ago.  She elected to not have surgery at that time.  She says that exercise helped both the pain and some weakness that she was experiencing that time.  More recently, she describes an 18 month history of difficulty walking and weakness in both her legs as well as upper extremities.  She feels that her legs are weaker than her arms.  She also describes incontinence and fecal incontinence for the past 18 months to 2 years.  It is difficult for her to go from a seated to a standing position.  She also complains of dysphagia.  Along with the weakness she does complain of some neck pain and back pain, however, the weakness is her main complaint.  Walking and standing make her symptoms worse.  Lying down and sitting make her symptoms better.    Review of patient's allergies indicates:  No Known Allergies    Current Outpatient Medications   Medication Sig Dispense Refill    amoxicillin-clavulanate 875-125mg (AUGMENTIN) 875-125 mg per tablet Take 1 tablet by mouth every 12 (twelve) hours. 20 tablet 1    colestipol (COLESTID) 1 gram Tab Take 1-4 tablets (1-4 g total) by mouth daily as needed (diarrhea). 240 tablet 3    ergocalciferol (ERGOCALCIFEROL) 50,000 unit Cap Take 1 capsule (50,000 Units total) by mouth every 7 days. 12 capsule 3    escitalopram oxalate (LEXAPRO) 10 MG tablet Take 1 tablet (10 mg total) by mouth once daily. 30 tablet 2    fenofibrate micronized (LOFIBRA) 134 MG Cap Take 1 capsule (134 mg total) by mouth daily with breakfast. 30 capsule 5    flash glucose scanning reader (ZIPDIGS DARIUS 14 DAY READER) Misc Blood sugar readings AC  "and HS 1 each 6    flash glucose sensor (FREESTYLE DARIUS 14 DAY SENSOR) Kit Blood sugars AC and HS to slide 1 kit 6    glimepiride (AMARYL) 4 MG tablet TAKE ONE TABLET BY MOUTH TWO TIMES DAILY 60 tablet 5    insulin (LANTUS SOLOSTAR U-100 INSULIN) glargine 100 units/mL (3mL) SubQ pen Inject 30 Units into the skin every evening. 15 mL 1    insulin aspart U-100 (NOVOLOG FLEXPEN U-100 INSULIN) 100 unit/mL (3 mL) InPn pen Inject 10 Units into the skin 3 (three) times daily with meals. 15 mL 1    levothyroxine (SYNTHROID) 125 MCG tablet Take 1 tablet (125 mcg total) by mouth before breakfast. 30 tablet 5    losartan-hydrochlorothiazide 100-12.5 mg (HYZAAR) 100-12.5 mg Tab Take 1 tablet by mouth once daily. 30 tablet 5    mupirocin (BACTROBAN) 2 % ointment Apply topically 3 (three) times daily. 30 g 1    oxybutynin (DITROPAN-XL) 10 MG 24 hr tablet Take 1 tablet (10 mg total) by mouth once daily. 30 tablet 11    pen needle, diabetic 32 gauge x 1/4" Ndle Use to inject with insulin four times a day 100 each 1    torsemide (DEMADEX) 20 MG Tab Take 1 tablet (20 mg total) by mouth once daily. 30 tablet 5     No current facility-administered medications for this visit.        Past Medical History:   Diagnosis Date    Anemia     CHF (congestive heart failure)     Diabetes mellitus, type 2     Hypertension     Thyroid disease      Past Surgical History:   Procedure Laterality Date     SECTION      CHOLECYSTECTOMY      COLONOSCOPY N/A 2019    Procedure: COLONOSCOPY WITH BIOPSY;  Surgeon: Yoseph Mendez MD;  Location: [facility];  Service: Colon and Rectal;  Laterality: N/A;    COLONOSCOPY N/A 10/29/2019    Procedure: COLONOSCOPY;  Surgeon: Yoseph Mendez MD;  Location: [facility] ([address] [place] [address]);  Service: Colon and Rectal;  Laterality: N/A;    TONSILLECTOMY      URETHRA SURGERY       Family History     Problem Relation (Age of Onset)    Diabetes Mother    Diverticulitis Mother    Heart disease Father    "     Social History     Socioeconomic History    Marital status: Single     Spouse name: Not on file    Number of children: Not on file    Years of education: Not on file    Highest education level: Not on file   Occupational History    Not on file   Social Needs    Financial resource strain: Not on file    Food insecurity:     Worry: Not on file     Inability: Not on file    Transportation needs:     Medical: Not on file     Non-medical: Not on file   Tobacco Use    Smoking status: Never Smoker    Smokeless tobacco: Never Used   Substance and Sexual Activity    Alcohol use: Yes     Comment: socially once every 3 years    Drug use: No    Sexual activity: Never   Lifestyle    Physical activity:     Days per week: Not on file     Minutes per session: Not on file    Stress: Not on file   Relationships    Social connections:     Talks on phone: Not on file     Gets together: Not on file     Attends Christian service: Not on file     Active member of club or organization: Not on file     Attends meetings of clubs or organizations: Not on file     Relationship status: Not on file   Other Topics Concern    Not on file   Social History Narrative    Not on file       Review of Systems:  Review of Systems   Constitutional: Positive for fatigue. Negative for activity change, diaphoresis, fever and unexpected weight change.   HENT: Positive for trouble swallowing and voice change. Negative for hearing loss, nosebleeds and tinnitus.    Eyes: Positive for visual disturbance.   Respiratory: Positive for shortness of breath. Negative for cough and wheezing.    Cardiovascular: Positive for palpitations. Negative for chest pain.   Gastrointestinal: Positive for constipation and diarrhea. Negative for abdominal distention, abdominal pain, blood in stool, nausea and vomiting.   Endocrine: Negative for cold intolerance and heat intolerance.   Genitourinary: Negative for difficulty urinating, dysuria, frequency and  "urgency.   Musculoskeletal: Positive for back pain, gait problem and neck pain. Negative for joint swelling, myalgias and neck stiffness.   Skin: Negative for color change, rash and wound.   Allergic/Immunologic: Negative for environmental allergies and food allergies.   Neurological: Positive for weakness and numbness. Negative for dizziness, seizures, facial asymmetry, speech difficulty, light-headedness and headaches.   Hematological: Does not bruise/bleed easily.   Psychiatric/Behavioral: Negative for agitation, behavioral problems, dysphoric mood and hallucinations. The patient is nervous/anxious.        OBJECTIVE:     Vital Signs  Temp: 98 °F (36.7 °C)  Pulse: 63  BP: (!) 212/84  Pain Score:   8  Height: 5' 5" (165.1 cm)  Weight: 98 kg (216 lb)  Body mass index is 35.94 kg/m².      Physical Exam:  Physical Exam:  Vitals reviewed.    Constitutional: She appears well-developed and well-nourished. No distress.     Eyes: Pupils are equal, round, and reactive to light. Conjunctivae and EOM are normal.     Cardiovascular: Normal rate, regular rhythm, normal pulses and no edema.     Abdominal: Soft. Bowel sounds are normal.     Skin: Skin displays no rash on trunk and no rash on extremities. Skin displays no lesions on trunk and no lesions on extremities.     Psych/Behavior: She is alert. She is oriented to person, place, and time. She has a normal mood and affect.     Musculoskeletal: Gait is abnormal.        Neck: Range of motion is full. There is no tenderness. Muscle strength is 5/5. Tone is normal.        Back: Range of motion is full. There is no tenderness. Muscle strength is 5/5. Tone is normal.        Right Upper Extremities: Range of motion is full. There is no tenderness. Muscle strength is 4/5. Tone is normal.        Left Upper Extremities: Range of motion is full. There is no tenderness. Muscle strength is 4/5. Tone is normal.       Right Lower Extremities: Range of motion is full. There is no " tenderness. Muscle strength is 4/5. Tone is normal.        Left Lower Extremities: Range of motion is full. There is no tenderness. Muscle strength is 4/5. Tone is normal.     Neurological:        Coordination: She has a normal Romberg Test, normal finger to nose coordination and normal tandem walking coordination.        Sensory: There is no sensory deficit in the trunk. There is no sensory deficit in the extremities.        DTRs: DTRs are DTRS NORMAL AND SYMMETRICnormal and symmetric. She displays no Babinski's sign on the right side. She displays no Babinski's sign on the left side.        Cranial nerves: Cranial nerve(s) II, III, IV, V, VI, VII, VIII, IX, X, XI and XII are intact.         Diagnostic Results:  She has an MRI of the lumbar spine available for review which I personally reviewed.  There is a grade 1 anterolisthesis of L5 on S1.  There is unroofing of the L5-S1 disc.  There is no significant central canal narrowing or neural foraminal narrowing at the L5-S1 level.  At T12-L1, there is a disc bulge eccentric to the right.  This is causing some lateral gutter stenosis as well as right T12-L1 neural foraminal narrowing.  The thecal sac is pushed towards the left.    ASSESSMENT/PLAN:     Ms. Hunter is a 54-year-old female who complains of upper and lower extremity weakness as described above.  She does have a T12-L1 herniated disc on her most recent MRI of the lumbar spine.  This is causing some compression of the conus but this is eccentric to the right and I do not feel that it accounts for her bilateral lower extremity symptoms.  Furthermore, I do not feel that it accounts for her dysphagia nor her upper extremity symptoms.  Therefore, before proceeding with any treatment recommendations, I would like to get an MRI of the cervical spine and thoracic spine.  Once these imaging studies are done I will see the patient back in follow-up and will make further treatment plan at that time.  She knows she  can call with any further questions or concerns in the meantime.        Note dictated with voice recognition software, please excuse any grammatical errors.

## 2020-01-06 NOTE — TELEPHONE ENCOUNTER
----- Message from Ulysses Guerra sent at 1/6/2020 12:59 PM CST -----  Contact: Ladonna  Ms. Hunter would like to know if she is able to get a sooner appointment. She can be reached at [phone]

## 2020-01-06 NOTE — LETTER
January 8, 2020      José Manuel Torres MD  [address]  [facility] [address]           [facility] - Neurosurgery 7th Fl  [address]  [place] [address]  Phone: [phone]          Patient: Ladonna Hunter   MR Number: [id number]   YOB: 1965   Date of Visit: 1/6/2020       Dear Dr. José Manuel Torres:    Thank you for referring Ladonna Hunter to me for evaluation. Attached you will find relevant portions of my assessment and plan of care.    If you have questions, please do not hesitate to call me. I look forward to following Ladonna Hunter along with you.    Sincerely,    Monika Plasencia MD    Enclosure  CC:  No Recipients    If you would like to receive this communication electronically, please contact [email or web address] or [phone] to request more information on [facility] access.    For providers and/or their staff who would like to refer a patient to [facility], please contact us through our one-stop-shop provider referral line, [facility] , at [phone].    If you feel you have received this communication in error or would no longer like to receive these types of communications, please e-mail [email or web address]

## 2020-01-14 ENCOUNTER — PATIENT OUTREACH (OUTPATIENT)
Dept: ADMINISTRATIVE | Facility: OTHER | Age: 55
End: 2020-01-14

## 2020-01-15 ENCOUNTER — OFFICE VISIT (OUTPATIENT)
Dept: UROLOGY | Facility: CLINIC | Age: 55
End: 2020-01-15
Attending: UROLOGY
Payer: COMMERCIAL

## 2020-01-15 ENCOUNTER — HOSPITAL ENCOUNTER (OUTPATIENT)
Dept: RADIOLOGY | Facility: HOSPITAL | Age: 55
Discharge: HOME | End: 2020-01-15
Attending: NEUROLOGICAL SURGERY
Payer: COMMERCIAL

## 2020-01-15 VITALS
WEIGHT: 202.19 LBS | DIASTOLIC BLOOD PRESSURE: 82 MMHG | BODY MASS INDEX: 33.69 KG/M2 | HEIGHT: 65 IN | RESPIRATION RATE: 16 BRPM | HEART RATE: 62 BPM | SYSTOLIC BLOOD PRESSURE: 172 MMHG

## 2020-01-15 DIAGNOSIS — N31.9 NEUROGENIC BLADDER: Primary | ICD-10-CM

## 2020-01-15 DIAGNOSIS — M48.02 SPINAL STENOSIS OF CERVICAL REGION: ICD-10-CM

## 2020-01-15 DIAGNOSIS — M48.04 SPINAL STENOSIS OF THORACIC REGION: ICD-10-CM

## 2020-01-15 PROCEDURE — 99999 PR PBB SHADOW E&M-EST. PATIENT-LVL III: ICD-10-PCS | Mod: PBBFAC,,, | Performed by: UROLOGY

## 2020-01-15 PROCEDURE — 72141 MRI NECK SPINE W/O DYE: CPT | Mod: TC

## 2020-01-15 PROCEDURE — 72146 MRI CHEST SPINE W/O DYE: CPT | Mod: TC

## 2020-01-15 PROCEDURE — 3079F DIAST BP 80-89 MM HG: CPT | Mod: CPTII,S$GLB,, | Performed by: UROLOGY

## 2020-01-15 PROCEDURE — 3008F BODY MASS INDEX DOCD: CPT | Mod: CPTII,S$GLB,, | Performed by: UROLOGY

## 2020-01-15 PROCEDURE — 3077F SYST BP >= 140 MM HG: CPT | Mod: CPTII,S$GLB,, | Performed by: UROLOGY

## 2020-01-15 PROCEDURE — 99214 PR OFFICE/OUTPT VISIT, EST, LEVL IV, 30-39 MIN: ICD-10-PCS | Mod: S$GLB,,, | Performed by: UROLOGY

## 2020-01-15 PROCEDURE — 3077F PR MOST RECENT SYSTOLIC BLOOD PRESSURE >= 140 MM HG: ICD-10-PCS | Mod: CPTII,S$GLB,, | Performed by: UROLOGY

## 2020-01-15 PROCEDURE — 3008F PR BODY MASS INDEX (BMI) DOCUMENTED: ICD-10-PCS | Mod: CPTII,S$GLB,, | Performed by: UROLOGY

## 2020-01-15 PROCEDURE — 99999 PR PBB SHADOW E&M-EST. PATIENT-LVL III: CPT | Mod: PBBFAC,,, | Performed by: UROLOGY

## 2020-01-15 PROCEDURE — 3079F PR MOST RECENT DIASTOLIC BLOOD PRESSURE 80-89 MM HG: ICD-10-PCS | Mod: CPTII,S$GLB,, | Performed by: UROLOGY

## 2020-01-15 PROCEDURE — 99214 OFFICE O/P EST MOD 30 MIN: CPT | Mod: S$GLB,,, | Performed by: UROLOGY

## 2020-01-15 NOTE — PROGRESS NOTES
Subjective:       Patient ID: Ladonna Hunter is a 54 y.o. female.    Chief Complaint: Follow-up    HPI patient has a neurogenic bladder and is on month to of Castro catheterization.  Her Lasix renal scan showed no obstruction with Castro catheter in place.  Her pedal edema has gone down tremendously.  She has lost over 16 lb.  Her urine today is grossly clear.  She is here for catheter change and the catheter was changed without difficulty.  She is not ready to try to clean intermittent catheterization and prefers to have Castro catheter left in place    Past Medical History:   Diagnosis Date    Anemia     CHF (congestive heart failure)     Diabetes mellitus, type 2     Hypertension     Thyroid disease        Past Surgical History:   Procedure Laterality Date     SECTION      CHOLECYSTECTOMY      COLONOSCOPY N/A 2019    Procedure: COLONOSCOPY WITH BIOPSY;  Surgeon: Yoseph Mendez MD;  Location: [facility];  Service: Colon and Rectal;  Laterality: N/A;    COLONOSCOPY N/A 10/29/2019    Procedure: COLONOSCOPY;  Surgeon: Yoseph Mendez MD;  [facility] ([facility]  Service: Colon and Rectal;  Laterality: N/A;    TONSILLECTOMY      URETHRA SURGERY         Family History   Problem Relation Age of Onset    Diverticulitis Mother     Diabetes Mother     Heart disease Father        Social History     Socioeconomic History    Marital status: Single     Spouse name: Not on file    Number of children: Not on file    Years of education: Not on file    Highest education level: Not on file   Occupational History    Not on file   Social Needs    Financial resource strain: Not on file    Food insecurity:     Worry: Not on file     Inability: Not on file    Transportation needs:     Medical: Not on file     Non-medical: Not on file   Tobacco Use    Smoking status: Never Smoker    Smokeless tobacco: Never Used   Substance and Sexual Activity    Alcohol use: Yes     Comment: socially once  every 3 years    Drug use: No    Sexual activity: Never   Lifestyle    Physical activity:     Days per week: Not on file     Minutes per session: Not on file    Stress: Not on file   Relationships    Social connections:     Talks on phone: Not on file     Gets together: Not on file     Attends Oriental orthodox service: Not on file     Active member of club or organization: Not on file     Attends meetings of clubs or organizations: Not on file     Relationship status: Not on file   Other Topics Concern    Not on file   Social History Narrative    Not on file       Allergies:  Patient has no known allergies.    Medications:    Current Outpatient Medications:     colestipol (COLESTID) 1 gram Tab, Take 1-4 tablets (1-4 g total) by mouth daily as needed (diarrhea)., Disp: 240 tablet, Rfl: 3    ergocalciferol (ERGOCALCIFEROL) 50,000 unit Cap, Take 1 capsule (50,000 Units total) by mouth every 7 days., Disp: 12 capsule, Rfl: 3    escitalopram oxalate (LEXAPRO) 10 MG tablet, Take 1 tablet (10 mg total) by mouth once daily., Disp: 30 tablet, Rfl: 2    flash glucose scanning reader (FREESTYLE DARIUS 14 DAY READER) Misc, Blood sugar readings AC and HS, Disp: 1 each, Rfl: 6    flash glucose sensor (FREESTYLE DARIUS 14 DAY SENSOR) Kit, Blood sugars AC and HS to slide, Disp: 1 kit, Rfl: 6    glimepiride (AMARYL) 4 MG tablet, TAKE ONE TABLET BY MOUTH TWO TIMES DAILY, Disp: 60 tablet, Rfl: 5    insulin (LANTUS SOLOSTAR U-100 INSULIN) glargine 100 units/mL (3mL) SubQ pen, Inject 30 Units into the skin every evening., Disp: 15 mL, Rfl: 1    insulin aspart U-100 (NOVOLOG FLEXPEN U-100 INSULIN) 100 unit/mL (3 mL) InPn pen, Inject 10 Units into the skin 3 (three) times daily with meals., Disp: 15 mL, Rfl: 1    levothyroxine (SYNTHROID) 125 MCG tablet, Take 1 tablet (125 mcg total) by mouth before breakfast., Disp: 30 tablet, Rfl: 5    losartan-hydrochlorothiazide 100-12.5 mg (HYZAAR) 100-12.5 mg Tab, Take 1 tablet by mouth once  "daily., Disp: 30 tablet, Rfl: 5    mupirocin (BACTROBAN) 2 % ointment, Apply topically 3 (three) times daily., Disp: 30 g, Rfl: 1    oxybutynin (DITROPAN-XL) 10 MG 24 hr tablet, Take 1 tablet (10 mg total) by mouth once daily., Disp: 30 tablet, Rfl: 11    pen needle, diabetic 32 gauge x 1/4" Ndle, Use to inject with insulin four times a day, Disp: 100 each, Rfl: 1    torsemide (DEMADEX) 20 MG Tab, Take 1 tablet (20 mg total) by mouth once daily., Disp: 30 tablet, Rfl: 5    amoxicillin-clavulanate 875-125mg (AUGMENTIN) 875-125 mg per tablet, Take 1 tablet by mouth every 12 (twelve) hours. (Patient not taking: Reported on 1/15/2020), Disp: 20 tablet, Rfl: 1    fenofibrate micronized (LOFIBRA) 134 MG Cap, Take 1 capsule (134 mg total) by mouth daily with breakfast. (Patient not taking: Reported on 1/15/2020), Disp: 30 capsule, Rfl: 5    Review of Systems   Constitutional: Negative.    HENT: Negative.    Eyes: Negative.    Respiratory: Negative.    Endocrine: Negative.    Genitourinary: Negative.    Musculoskeletal: Negative.    Allergic/Immunologic: Negative.    Neurological: Negative.    Hematological: Negative.    Psychiatric/Behavioral: Negative.        Objective:      Physical Exam   Constitutional: She appears well-developed.   HENT:   Head: Normocephalic.   Cardiovascular: Normal rate.    Pulmonary/Chest: Effort normal.   Abdominal: Soft.   Musculoskeletal: Normal range of motion.   Neurological: She is alert.   Skin: Skin is warm.         Assessment:       1. Neurogenic bladder        Plan:       Ladonna was seen today for follow-up.    Diagnoses and all orders for this visit:    Neurogenic bladder        Castro catheter changed without difficulty.  Will continue observation for now and when she comes in a month to change the next catheter we will get a urine culture and then get a simple urodynamics and cystoscopy  "

## 2020-01-16 ENCOUNTER — PATIENT MESSAGE (OUTPATIENT)
Dept: NEUROSURGERY | Facility: CLINIC | Age: 55
End: 2020-01-16

## 2020-01-22 ENCOUNTER — TELEPHONE (OUTPATIENT)
Dept: FAMILY MEDICINE | Facility: CLINIC | Age: 55
End: 2020-01-22

## 2020-01-22 ENCOUNTER — PATIENT MESSAGE (OUTPATIENT)
Dept: NEUROSURGERY | Facility: CLINIC | Age: 55
End: 2020-01-22

## 2020-01-22 DIAGNOSIS — I10 HYPERTENSION, UNSPECIFIED TYPE: Primary | ICD-10-CM

## 2020-01-22 RX ORDER — LOSARTAN POTASSIUM 100 MG/1
100 TABLET ORAL DAILY
Qty: 30 TABLET | Refills: 5 | Status: SHIPPED | OUTPATIENT
Start: 2020-01-22 | End: 2021-01-11 | Stop reason: SDUPTHER

## 2020-01-22 RX ORDER — HYDROCHLOROTHIAZIDE 12.5 MG/1
12.5 TABLET ORAL DAILY
Qty: 30 TABLET | Refills: 5 | Status: ON HOLD | OUTPATIENT
Start: 2020-01-22 | End: 2020-12-11 | Stop reason: HOSPADM

## 2020-01-23 ENCOUNTER — TELEPHONE (OUTPATIENT)
Dept: FAMILY MEDICINE | Facility: CLINIC | Age: 55
End: 2020-01-23

## 2020-01-23 DIAGNOSIS — E11.9 TYPE 2 DIABETES MELLITUS WITHOUT COMPLICATION, WITHOUT LONG-TERM CURRENT USE OF INSULIN: Primary | ICD-10-CM

## 2020-01-23 PROBLEM — L97.919 IDIOPATHIC CHRONIC VENOUS HYPERTENSION OF RIGHT LOWER EXTREMITY WITH ULCER: Status: ACTIVE | Noted: 2020-01-23

## 2020-01-23 PROBLEM — L97.811 NON-PRESSURE CHRONIC ULCER OF OTHER PART OF RIGHT LOWER LEG LIMITED TO BREAKDOWN OF SKIN: Status: ACTIVE | Noted: 2020-01-23

## 2020-01-23 PROBLEM — I87.311 IDIOPATHIC CHRONIC VENOUS HYPERTENSION OF RIGHT LOWER EXTREMITY WITH ULCER: Status: ACTIVE | Noted: 2020-01-23

## 2020-01-23 PROBLEM — L97.929 IDIOPATHIC CHRONIC VENOUS HYPERTENSION OF LEFT LOWER EXTREMITY WITH ULCER: Status: ACTIVE | Noted: 2020-01-23

## 2020-01-23 PROBLEM — L97.822 NON-PRESSURE CHRONIC ULCER OF OTHER PART OF LEFT LOWER LEG WITH FAT LAYER EXPOSED: Status: ACTIVE | Noted: 2020-01-23

## 2020-01-23 PROBLEM — I87.312 IDIOPATHIC CHRONIC VENOUS HYPERTENSION OF LEFT LOWER EXTREMITY WITH ULCER: Status: ACTIVE | Noted: 2020-01-23

## 2020-01-23 RX ORDER — LANCETS
EACH MISCELLANEOUS
Qty: 100 EACH | Refills: 5 | Status: SHIPPED | OUTPATIENT
Start: 2020-01-23 | End: 2020-04-29 | Stop reason: SDUPTHER

## 2020-01-23 NOTE — TELEPHONE ENCOUNTER
----- Message from Fadi Houston sent at 2020  4:04 PM CST -----  Contact: Patient  Ladonna Hunter  MRN: [id number]  : 1965  PCP: Mayra Islas  Home Phone      [phone]  Work Phone      Not on file.  Mobile          [phone]      MESSAGE: requesting refills on test strips & lancets for Accu-Chek Jayla -- uses [facility] in [place]    Call [phone]    PCP: Roshan

## 2020-01-27 ENCOUNTER — TELEPHONE (OUTPATIENT)
Dept: FAMILY MEDICINE | Facility: CLINIC | Age: 55
End: 2020-01-27

## 2020-01-27 NOTE — TELEPHONE ENCOUNTER
Spoke with Lisa at [facility] about PA for test strips and lancets, gave contact info for [facility].  Spoke with Chelsie at [facility] about PA for Jayla test strips and lancets. Will pepe as a urgent request. ([employer] system is preferred). [phone]  Reference # [id number], waiting on response.

## 2020-01-27 NOTE — TELEPHONE ENCOUNTER
----- Message from Margi Bettencourt sent at 2020 11:37 AM CST -----  Contact: Self  Ladonna Hunter  MRN: [id number]  : 1965  PCP: Mayra Islas  Home Phone      [phone]  Work Phone      Not on file.  Mobile          [phone]      MESSAGE:   Patient is calling for the status of the PA request that was sent.     Patient was asked if we received a PA request from the pharmacy and she yelled that she is not sure and this should've been taken care of a week ago patient hung up.

## 2020-01-30 ENCOUNTER — PATIENT OUTREACH (OUTPATIENT)
Dept: ADMINISTRATIVE | Facility: OTHER | Age: 55
End: 2020-01-30

## 2020-01-31 ENCOUNTER — OFFICE VISIT (OUTPATIENT)
Dept: NEUROSURGERY | Facility: CLINIC | Age: 55
End: 2020-01-31
Payer: COMMERCIAL

## 2020-01-31 VITALS
SYSTOLIC BLOOD PRESSURE: 194 MMHG | WEIGHT: 202.13 LBS | HEIGHT: 65 IN | TEMPERATURE: 97 F | HEART RATE: 65 BPM | BODY MASS INDEX: 33.67 KG/M2 | DIASTOLIC BLOOD PRESSURE: 82 MMHG

## 2020-01-31 DIAGNOSIS — R29.898 WEAKNESS OF BOTH ARMS: ICD-10-CM

## 2020-01-31 DIAGNOSIS — M47.12 OSTEOARTHRITIS OF CERVICAL SPINE WITH MYELOPATHY: ICD-10-CM

## 2020-01-31 DIAGNOSIS — R29.898 WEAKNESS OF BOTH LOWER EXTREMITIES: Primary | ICD-10-CM

## 2020-01-31 DIAGNOSIS — M47.26 OSTEOARTHRITIS OF SPINE WITH RADICULOPATHY, LUMBAR REGION: ICD-10-CM

## 2020-01-31 PROCEDURE — 99999 PR PBB SHADOW E&M-EST. PATIENT-LVL III: CPT | Mod: PBBFAC,,, | Performed by: NEUROLOGICAL SURGERY

## 2020-01-31 PROCEDURE — 3079F DIAST BP 80-89 MM HG: CPT | Mod: CPTII,S$GLB,, | Performed by: NEUROLOGICAL SURGERY

## 2020-01-31 PROCEDURE — 3077F SYST BP >= 140 MM HG: CPT | Mod: CPTII,S$GLB,, | Performed by: NEUROLOGICAL SURGERY

## 2020-01-31 PROCEDURE — 99213 PR OFFICE/OUTPT VISIT, EST, LEVL III, 20-29 MIN: ICD-10-PCS | Mod: S$GLB,,, | Performed by: NEUROLOGICAL SURGERY

## 2020-01-31 PROCEDURE — 3008F PR BODY MASS INDEX (BMI) DOCUMENTED: ICD-10-PCS | Mod: CPTII,S$GLB,, | Performed by: NEUROLOGICAL SURGERY

## 2020-01-31 PROCEDURE — 3008F BODY MASS INDEX DOCD: CPT | Mod: CPTII,S$GLB,, | Performed by: NEUROLOGICAL SURGERY

## 2020-01-31 PROCEDURE — 99213 OFFICE O/P EST LOW 20 MIN: CPT | Mod: S$GLB,,, | Performed by: NEUROLOGICAL SURGERY

## 2020-01-31 PROCEDURE — 3077F PR MOST RECENT SYSTOLIC BLOOD PRESSURE >= 140 MM HG: ICD-10-PCS | Mod: CPTII,S$GLB,, | Performed by: NEUROLOGICAL SURGERY

## 2020-01-31 PROCEDURE — 99999 PR PBB SHADOW E&M-EST. PATIENT-LVL III: ICD-10-PCS | Mod: PBBFAC,,, | Performed by: NEUROLOGICAL SURGERY

## 2020-01-31 PROCEDURE — 3079F PR MOST RECENT DIASTOLIC BLOOD PRESSURE 80-89 MM HG: ICD-10-PCS | Mod: CPTII,S$GLB,, | Performed by: NEUROLOGICAL SURGERY

## 2020-01-31 NOTE — PROGRESS NOTES
Established Pateint    SUBJECTIVE:     History of Present Illness:  Ms. Hunter is a 54-year-old female who is seeing me today in follow-up.  Her last neurosurgery clinic appointment was on January 6, 2020.  She has a history of cervical disc bulges diagnosed 20 years ago which she elected to not have surgery at that time.  She is seeing me for a 3 year history of left leg heaviness which has been worsening over the past 18 months to 2 years.  Over this time course she reports difficulty walking and weakness in both her legs as well as upper extremities.  She feels that her legs are weaker than her arms.  She also describes urinary incontinence as well as fecal incontinence for the past 18 months to 2 years.  It is difficult for her to go from a seated to a standing position.  She does have some neck pain some low back pain but her weakness is worse than the pain.  She complains of dysphagia which she states that she has had for many years and has not worsened recently.  She denies any muscular wasting.  She had an MRI of the lumbar spine at the time of her last clinic appointment which showed a T12-L1 herniated disc with lateral gutter stenosis eccentric to the right.  There was some conus compression but this was not significant and I was not convinced that it was the cause of the patient's symptoms especially given her upper extremity weakness.  Therefore, I had sent the patient for an MRI of the cervical and thoracic spines.  She is here today to see me in follow-up.  She continues to complain of weakness in her arms and legs.  Her legs continue to be worse.  Her symptoms have not changed since that time of her last appointment.  Her left leg is weaker than her right leg.    Review of patient's allergies indicates:  No Known Allergies    Current Outpatient Medications   Medication Sig Dispense Refill    amoxicillin-clavulanate 875-125mg (AUGMENTIN) 875-125 mg per tablet Take 1 tablet by mouth every 12 (twelve)  "hours. 20 tablet 1    blood sugar diagnostic (ACCU-CHEK KENJI PLUS TEST STRP) Strp QID blood sugars 100 each 5    colestipol (COLESTID) 1 gram Tab Take 1-4 tablets (1-4 g total) by mouth daily as needed (diarrhea). 240 tablet 3    ergocalciferol (ERGOCALCIFEROL) 50,000 unit Cap Take 1 capsule (50,000 Units total) by mouth every 7 days. 12 capsule 3    escitalopram oxalate (LEXAPRO) 10 MG tablet Take 1 tablet (10 mg total) by mouth once daily. 30 tablet 2    fenofibrate micronized (LOFIBRA) 134 MG Cap Take 1 capsule (134 mg total) by mouth daily with breakfast. 30 capsule 5    flash glucose scanning reader (FREESTYLE DARIUS 14 DAY READER) Misc Blood sugar readings AC and HS 1 each 6    flash glucose sensor (FREESTYLE DARIUS 14 DAY SENSOR) Kit Blood sugars AC and HS to slide 1 kit 6    glimepiride (AMARYL) 4 MG tablet TAKE ONE TABLET BY MOUTH TWO TIMES DAILY 60 tablet 5    hydroCHLOROthiazide (HYDRODIURIL) 12.5 MG Tab Take 1 tablet (12.5 mg total) by mouth once daily. 30 tablet 5    insulin (LANTUS SOLOSTAR U-100 INSULIN) glargine 100 units/mL (3mL) SubQ pen Inject 30 Units into the skin every evening. 15 mL 1    insulin aspart U-100 (NOVOLOG FLEXPEN U-100 INSULIN) 100 unit/mL (3 mL) InPn pen Inject 10 Units into the skin 3 (three) times daily with meals. 15 mL 1    lancets (ACCU-CHEK SOFTCLIX LANCETS) Misc QID blood sugars 100 each 5    levothyroxine (SYNTHROID) 125 MCG tablet Take 1 tablet (125 mcg total) by mouth before breakfast. 30 tablet 5    losartan (COZAAR) 100 MG tablet Take 1 tablet (100 mg total) by mouth once daily. 30 tablet 5    losartan-hydrochlorothiazide 100-12.5 mg (HYZAAR) 100-12.5 mg Tab Take 1 tablet by mouth once daily. 30 tablet 5    mupirocin (BACTROBAN) 2 % ointment Apply topically 3 (three) times daily. 30 g 1    oxybutynin (DITROPAN-XL) 10 MG 24 hr tablet Take 1 tablet (10 mg total) by mouth once daily. 30 tablet 11    pen needle, diabetic 32 gauge x 1/4" Ndle Use to inject " with insulin four times a day 100 each 1    torsemide (DEMADEX) 20 MG Tab Take 1 tablet (20 mg total) by mouth once daily. 30 tablet 5     No current facility-administered medications for this visit.        Past Medical History:   Diagnosis Date    Anemia     CHF (congestive heart failure)     Diabetes mellitus, type 2     Hypertension     Thyroid disease      Past Surgical History:   Procedure Laterality Date     SECTION      CHOLECYSTECTOMY      COLONOSCOPY N/A 2019    Procedure: COLONOSCOPY WITH BIOPSY;  Surgeon: Yoseph Mendez MD;  Location: [facility];  Service: Colon and Rectal;  Laterality: N/A;    COLONOSCOPY N/A 10/29/2019    Procedure: COLONOSCOPY;  Surgeon: Yoseph Mendez MD;  Location[facility] ([facility]);  Service: Colon and Rectal;  Laterality: N/A;    TONSILLECTOMY      URETHRA SURGERY       Family History     Problem Relation (Age of Onset)    Diabetes Mother    Diverticulitis Mother    Heart disease Father        Social History     Socioeconomic History    Marital status: Single     Spouse name: Not on file    Number of children: Not on file    Years of education: Not on file    Highest education level: Not on file   Occupational History    Not on file   Social Needs    Financial resource strain: Not on file    Food insecurity:     Worry: Not on file     Inability: Not on file    Transportation needs:     Medical: Not on file     Non-medical: Not on file   Tobacco Use    Smoking status: Never Smoker    Smokeless tobacco: Never Used   Substance and Sexual Activity    Alcohol use: Yes     Comment: socially once every 3 years    Drug use: No    Sexual activity: Never   Lifestyle    Physical activity:     Days per week: Not on file     Minutes per session: Not on file    Stress: Not on file   Relationships    Social connections:     Talks on phone: Not on file     Gets together: Not on file     Attends Worship service: Not on file     Active member of club or  "organization: Not on file     Attends meetings of clubs or organizations: Not on file     Relationship status: Not on file   Other Topics Concern    Not on file   Social History Narrative    Not on file       Review of Systems:  Review of Systems    OBJECTIVE:     Vital Signs  Temp: 96.5 °F (35.8 °C)  Pulse: 65  BP: (!) 194/82  Pain Score:   8  Height: 5' 5" (165.1 cm)  Weight: 91.7 kg (202 lb 1.6 oz)  Body mass index is 33.63 kg/m².    Physical Exam:  Physical Exam:    Constitutional: She appears well-developed and well-nourished. No distress.     Eyes: Pupils are equal, round, and reactive to light. Conjunctivae and EOM are normal.     Cardiovascular: Normal rate, regular rhythm, normal pulses and no edema.     Abdominal: Soft. Bowel sounds are normal.     Skin: Skin displays no rash on trunk and no rash on extremities. Skin displays no lesions on trunk and no lesions on extremities.     Psych/Behavior: She is alert. She is oriented to person, place, and time. She has a normal mood and affect.     Musculoskeletal: Gait is abnormal.        Neck: Range of motion is full. There is no tenderness. Muscle strength is 5/5. Tone is normal.        Back: Range of motion is full. There is no tenderness. Muscle strength is 5/5. Tone is normal.        Right Upper Extremities: Range of motion is limited. There is no tenderness. Muscle strength is 4/5. Tone is normal.        Left Upper Extremities: Range of motion is limited. There is no tenderness. Muscle strength is 4/5. Tone is normal.       Right Lower Extremities: Range of motion is limited. There is no tenderness. Muscle strength is 4/5. Tone is normal.        Left Lower Extremities: Range of motion is limited. There is no tenderness. Muscle strength is 4/5. Tone is normal.     Neurological:        Coordination: She has normal finger to nose coordination.        Sensory: There is no sensory deficit in the trunk. There is no sensory deficit in the extremities.        " DTRs: She displays no Babinski's sign on the right side. She displays no Babinski's sign on the left side.        Cranial nerves: Cranial nerve(s) II, III, IV, V, VI, VII, VIII, IX, X, XI and XII are intact.     She has no Rubalcava's.    Diagnostic Results:  She has an MRI of the thoracic spine available for review which I personally reviewed.  This shows normal alignment of the thoracic spine.  There is no significant disc bulge and no central canal narrowing or neural foraminal narrowing throughout.    She has an MRI of the cervical spine available for review which I personally reviewed.  This shows some straightening of the normal cervical lordosis.  There is multilevel cervical spondylosis worse at the C5-6 level.  There is an asymmetric disc bulge the left causing moderate central canal narrowing and bilateral neural foraminal narrowing.    ASSESSMENT/PLAN:     Ms. Hunter is a 54-year-old female with a several year history weakness as described above.  Her leg weakness is greater than her arm weakness.  She does have findings in both the upper lumbar as well as cervical spine with mild to moderate central canal narrowing.  Perhaps this could be a to hit phenomena and the reason she is weaker in her legs is because of the cervical stenosis and the upper lumbar stenosis.  However, I am worried about bulbar track findings and her dysphagia and that there may be an underlying neuromuscular disorder that has not been diagnosed.  Before proceeding with any surgical intervention I would like her to see Dr. Street of Neurology for wrote further workup and possible repeat EMG.  We will get this appointment scheduled in the near future.  I will contact the patient after her appointment with Dr. Street and if there are no abnormal findings we will proceed 1st with C5-6 ACDF followed by a T12-L1 decompression.  She knows she can call with any further questions or concerns in the meantime.        Note dictated with voice  recognition software, please excuse any grammatical errors.

## 2020-02-04 ENCOUNTER — PATIENT MESSAGE (OUTPATIENT)
Dept: NEUROSURGERY | Facility: CLINIC | Age: 55
End: 2020-02-04

## 2020-02-05 ENCOUNTER — TELEPHONE (OUTPATIENT)
Dept: FAMILY MEDICINE | Facility: CLINIC | Age: 55
End: 2020-02-05

## 2020-02-05 DIAGNOSIS — E11.9 TYPE 2 DIABETES MELLITUS WITHOUT COMPLICATION, WITHOUT LONG-TERM CURRENT USE OF INSULIN: Primary | ICD-10-CM

## 2020-02-05 NOTE — TELEPHONE ENCOUNTER
Received a fax from [facility]--her insurance will only cover True Metrix glucometer. So, they need new order for glucometer, test strips, and lancets. Check glucose 4 x daily.

## 2020-02-06 ENCOUNTER — TELEPHONE (OUTPATIENT)
Dept: FAMILY MEDICINE | Facility: CLINIC | Age: 55
End: 2020-02-06

## 2020-02-06 RX ORDER — DEXTROSE 4 G
TABLET,CHEWABLE ORAL
Qty: 1 EACH | Refills: 0 | Status: SHIPPED | OUTPATIENT
Start: 2020-02-06 | End: 2020-04-29

## 2020-02-06 NOTE — TELEPHONE ENCOUNTER
Called insurance and was told that pt has to [facility] all [facility] in order to have it covered 100%. If pt used a local pharmacy she will have to pay out of pocket and they will not reimburse her.  I called pt and informed her of this. She was very up set  and told me to just forget out it and she will just go buy her own testing strips.

## 2020-02-06 NOTE — TELEPHONE ENCOUNTER
----- Message from Beth Perez sent at 2020  8:31 AM CST -----  Contact: [facility]  Ladonna Hunter  MRN: [id number]  : 1965  PCP: Mayra Islas  Home Phone      [phone]  Work Phone      Not on file.  Mobile          [phone]      MESSAGE:   [facility] is calling because the blood sugar diagnostic Strips are not covered by patients insurance. Pharmacy want someone over here to call insurance and see what strips are covered by insurance.     [phone]

## 2020-02-10 ENCOUNTER — PATIENT OUTREACH (OUTPATIENT)
Dept: ADMINISTRATIVE | Facility: OTHER | Age: 55
End: 2020-02-10

## 2020-02-12 ENCOUNTER — CLINICAL SUPPORT (OUTPATIENT)
Dept: UROLOGY | Facility: CLINIC | Age: 55
End: 2020-02-12
Attending: UROLOGY
Payer: COMMERCIAL

## 2020-02-12 VITALS — BODY MASS INDEX: 33.69 KG/M2 | HEIGHT: 65 IN | RESPIRATION RATE: 16 BRPM | WEIGHT: 202.19 LBS

## 2020-02-12 DIAGNOSIS — N31.9 NEUROGENIC BLADDER: Primary | ICD-10-CM

## 2020-02-12 PROCEDURE — 87186 SC STD MICRODIL/AGAR DIL: CPT

## 2020-02-12 PROCEDURE — 87077 CULTURE AEROBIC IDENTIFY: CPT

## 2020-02-12 PROCEDURE — 87086 URINE CULTURE/COLONY COUNT: CPT

## 2020-02-12 PROCEDURE — 87088 URINE BACTERIA CULTURE: CPT

## 2020-02-14 LAB — BACTERIA UR CULT: ABNORMAL

## 2020-02-17 ENCOUNTER — TELEPHONE (OUTPATIENT)
Dept: UROLOGY | Facility: CLINIC | Age: 55
End: 2020-02-17

## 2020-02-17 ENCOUNTER — TELEPHONE (OUTPATIENT)
Dept: FAMILY MEDICINE | Facility: CLINIC | Age: 55
End: 2020-02-17

## 2020-02-17 PROBLEM — L97.812 NON-PRESSURE CHRONIC ULCER OF OTHER PART OF RIGHT LOWER LEG WITH FAT LAYER EXPOSED: Status: ACTIVE | Noted: 2020-01-23

## 2020-02-17 RX ORDER — SULFAMETHOXAZOLE AND TRIMETHOPRIM 800; 160 MG/1; MG/1
1 TABLET ORAL 2 TIMES DAILY
Qty: 20 TABLET | Refills: 0 | Status: SHIPPED | OUTPATIENT
Start: 2020-02-17 | End: 2020-03-18 | Stop reason: ALTCHOICE

## 2020-02-17 NOTE — TELEPHONE ENCOUNTER
----- Message from Alanna Petersen sent at 2020  1:19 PM CST -----  Contact: self  Ladonna Hunter  MRN: [id number]  : 1965  PCP: Mayra Islas  Home Phone      [phone]  Work Phone      Not on file.  Mobile          [phone]      MESSAGE:  Pt would like to know status of disability paperwork she brought last week.  Phone: [phone]

## 2020-02-17 NOTE — TELEPHONE ENCOUNTER
----- Message from Stephan Tsai Jr., MD sent at 2/17/2020  8:03 AM CST -----  Start bactrim 7 days before suds, cysto

## 2020-02-20 ENCOUNTER — TELEPHONE (OUTPATIENT)
Dept: SURGERY | Facility: CLINIC | Age: 55
End: 2020-02-20

## 2020-02-20 NOTE — TELEPHONE ENCOUNTER
----- Message from Jossy Dorado sent at 2/20/2020  1:55 PM CST -----  Contact: [phone]  Calling to speak with nurse regarding scheduling colonoscopy in [place] instead of [place]. Please advise

## 2020-02-20 NOTE — TELEPHONE ENCOUNTER
Spoke with patient, patient reports that colonoscopy scheduled in April is a follow up with Dr Mendez, he removed precancerous polyps in October.Will inquire with Dr Mendez to see if procedure can be done in [place].

## 2020-02-21 ENCOUNTER — TELEPHONE (OUTPATIENT)
Dept: SURGERY | Facility: CLINIC | Age: 55
End: 2020-02-21

## 2020-02-21 NOTE — TELEPHONE ENCOUNTER
Spoke with patient. Ok to change facility to [facility] and proceed with follow up colonoscopy at [facility], per Dr Mendez.

## 2020-03-16 ENCOUNTER — PROCEDURE VISIT (OUTPATIENT)
Dept: UROLOGY | Facility: CLINIC | Age: 55
End: 2020-03-16
Payer: MEDICAID

## 2020-03-16 VITALS
TEMPERATURE: 98 F | HEIGHT: 65 IN | WEIGHT: 202 LBS | SYSTOLIC BLOOD PRESSURE: 185 MMHG | DIASTOLIC BLOOD PRESSURE: 79 MMHG | HEART RATE: 71 BPM | BODY MASS INDEX: 33.66 KG/M2

## 2020-03-17 LAB
LEFT EYE DM RETINOPATHY: POSITIVE
RIGHT EYE DM RETINOPATHY: POSITIVE

## 2020-03-18 ENCOUNTER — HOSPITAL ENCOUNTER (OUTPATIENT)
Dept: PREADMISSION TESTING | Facility: HOSPITAL | Age: 55
Discharge: HOME | End: 2020-03-18
Attending: COLON & RECTAL SURGERY
Payer: MEDICAID

## 2020-03-18 PROBLEM — L97.821 NON-PRESSURE CHRONIC ULCER OF OTHER PART OF LEFT LOWER LEG LIMITED TO BREAKDOWN OF SKIN: Status: ACTIVE | Noted: 2020-03-18

## 2020-03-18 NOTE — DISCHARGE INSTRUCTIONS
Colonoscopy Outpatient procedure instructions    Prep Review  Follow instructions as written on Dr. Mendez's Colonoscopy Prep Sheet  Dr. Mendez patients have nothing to eat or drink after morning prep is complete      Take medications as instructed by your doctor.    Wear something comfortable that is easy for you to take off and put on.   Do not wear any makeup, jewelry, or body piercings. Leave valuables at home or let your family member keep them for you. Do not bring them to the Surgery area.     Date/Day of Procedure: Thursday 5/7/20    Arrival time: Someone will call you the workday day before the procedure  to give you an arrival time   If asked to report to the hospital before 7:00 am report to the Emergency Room.  If asked to report to the hospital at 7:00 a.m. or later report to Patient Registration  It is not necessary to report earlier than the time you are told.   Ignore any automated/computer generated calls telling to what time to report to the hospital.   Plan to be at the hospital for about 4 hours, however, it could be longer.       Diabetics  If you are diabetic do not take your diabetes medication the morning of the procedure unless otherwise instructed by your doctor.  It is important to monitor your blood sugar levels the day you are doing your prep to make sure your blood sugar levels are maintained.

## 2020-03-18 NOTE — PRE-PROCEDURE INSTRUCTIONS
suprep instructions given to pt. Voiced compliance. Dos moved due to covid-19. Will get cards clearance. Pt instructed to take synthroid and amlodipine morning of procedure.

## 2020-04-02 ENCOUNTER — TELEPHONE (OUTPATIENT)
Dept: ADMINISTRATIVE | Facility: HOSPITAL | Age: 55
End: 2020-04-02

## 2020-04-03 ENCOUNTER — PATIENT OUTREACH (OUTPATIENT)
Dept: ADMINISTRATIVE | Facility: HOSPITAL | Age: 55
End: 2020-04-03

## 2020-04-06 ENCOUNTER — TELEPHONE (OUTPATIENT)
Dept: UROLOGY | Facility: CLINIC | Age: 55
End: 2020-04-06

## 2020-04-06 NOTE — TELEPHONE ENCOUNTER
----- Message from Jossy Dorado sent at 4/6/2020  1:44 PM CDT -----  Contact: [phone]  Calling in regards to speaking with nurse Lee about plan of care, and catheter care. Please call

## 2020-04-13 ENCOUNTER — PATIENT OUTREACH (OUTPATIENT)
Dept: ADMINISTRATIVE | Facility: OTHER | Age: 55
End: 2020-04-13

## 2020-04-13 DIAGNOSIS — E11.9 TYPE 2 DIABETES MELLITUS WITHOUT COMPLICATION, UNSPECIFIED WHETHER LONG TERM INSULIN USE: Primary | ICD-10-CM

## 2020-04-14 ENCOUNTER — OFFICE VISIT (OUTPATIENT)
Dept: UROLOGY | Facility: CLINIC | Age: 55
End: 2020-04-14
Payer: MEDICAID

## 2020-04-14 DIAGNOSIS — N31.2 ATONIC BLADDER: ICD-10-CM

## 2020-04-14 DIAGNOSIS — Z97.8 CHRONIC INDWELLING FOLEY CATHETER: ICD-10-CM

## 2020-04-14 DIAGNOSIS — Z46.6 ENCOUNTER FOR FOLEY CATHETER REPLACEMENT: Primary | ICD-10-CM

## 2020-04-14 DIAGNOSIS — R33.9 URINARY RETENTION: ICD-10-CM

## 2020-04-14 PROCEDURE — 51702 PR INSERTION OF TEMPORARY INDWELLING BLADDER CATHETER, SIMPLE: ICD-10-PCS | Mod: S$PBB,,, | Performed by: NURSE PRACTITIONER

## 2020-04-14 PROCEDURE — 99999 PR PBB SHADOW E&M-EST. PATIENT-LVL IV: CPT | Mod: PBBFAC,,, | Performed by: NURSE PRACTITIONER

## 2020-04-14 PROCEDURE — 99999 PR PBB SHADOW E&M-EST. PATIENT-LVL IV: ICD-10-PCS | Mod: PBBFAC,,, | Performed by: NURSE PRACTITIONER

## 2020-04-14 PROCEDURE — 99214 OFFICE O/P EST MOD 30 MIN: CPT | Mod: PBBFAC,25 | Performed by: NURSE PRACTITIONER

## 2020-04-14 PROCEDURE — 51702 INSERT TEMP BLADDER CATH: CPT | Mod: PBBFAC | Performed by: NURSE PRACTITIONER

## 2020-04-14 PROCEDURE — 51702 INSERT TEMP BLADDER CATH: CPT | Mod: S$PBB,,, | Performed by: NURSE PRACTITIONER

## 2020-04-14 PROCEDURE — 99499 UNLISTED E&M SERVICE: CPT | Mod: S$PBB,,, | Performed by: NURSE PRACTITIONER

## 2020-04-14 PROCEDURE — 99499 NO LOS: ICD-10-PCS | Mod: S$PBB,,, | Performed by: NURSE PRACTITIONER

## 2020-04-14 NOTE — PATIENT INSTRUCTIONS
Urinary Retention (Female)    Urinary retention is the medical term for difficulty or inability to pass urine, even though your bladder is full.  Causes  For girls and women, the most common cause of urinary retention is a bladder infection. Certain medicines and changes in the body, such as uterine prolapse, can also cause this problem.  Symptoms  Some people have no symptoms. For others, common symptoms include:  · Bladder or lower-abdominal pain or fullness  · Abdominal swelling  · Nausea or vomiting  · Back pain  · Frequent urination  · Feeling that the bladder is still full after urinating  · Incontinence (not being able to control the release of urine)  Treatment  This condition is treated by inserting a tube (catheter) into the bladder to drain the urine. This provides immediate relief. The catheter may need to stay in place for a few days. The catheter has a balloon on the tip, which is inflated after insertion. This prevents the catheter from falling out.  Home care  · If you were given antibiotics to treat a bladder infection, take them until they are used up, or your healthcare provider tells you to stop. It is important to finish the antibiotics even though you feel better. This is to make sure your infection has cleared.  · If a catheter was left in place, it is important to keep bacteria from getting into the collection bag. Do not disconnect the catheter from the collection bag.  · Use a leg band to secure the drainage tube, so it does not pull on the catheter. Drain the collection bag when it becomes full using the drain spout at the bottom of the bag.  · Do not pull on or try to remove your catheter. This will injure your urethra. The catheter must be removed by a healthcare provider.  Follow-up care  Follow up with your healthcare provider, or as advised.  If a catheter was left in place, it can usually be removed within 3 to 7 days. Some conditions require that the catheter stays in longer. Your  healthcare provider will tell you when to return to have the catheter removed.  When to seek medical advice  Call your healthcare provider right away if any of these occur:  · Fever of 100.4ºF (38ºC) or higher, or as directed by your healthcare provider  · Bladder or lower-abdominal pain or fullness  · Abdominal swelling, nausea, vomiting, or back pain  · Blood or urine leakage around the catheter  · Bloody urine coming from the catheter (if a new symptom)  · Weakness, dizziness, or fainting  · Confusion or change in usual level of alertness  · If a catheter was left in place, return if:  ¨ Catheter falls out  ¨ Catheter stops draining for 6 hours  Date Last Reviewed: 7/26/2015  © 9291-0348 [employer]. [address] [place] [address], [place] [address]. All rights reserved. This information is not intended as a substitute for professional medical care. Always follow your healthcare professional's instructions.        Castro Catheter Care    A Castro catheter is a rubber tube that is placed through the urethra (opening where urine comes out) and into the bladder. This helps drain urine from the bladder. There is a small balloon on the end of the tube that is inflated after insertion. This keeps the catheter from sliding out of the bladder.  A Castro catheter is used to treat urinary retention (unable to pass urine). It is also used when there is incontinence (loss of bladder control).  Home care  · Finish taking any prescribed antibiotic even if you are feeling better before then.  · It is important to keep bacteria from getting into the collection bag. Do not disconnect the catheter from the collection bag.  · Use a leg band to secure the drainage tube, so it does not pull on the catheter. Drain the collection bag when it becomes full using the drain spout at the bottom of the bag.  · Do not try to pull or remove your catheter. This will injure your urethra. It must be removed by your healthcare provider or  nurse.  Follow-up care  Follow up with your healthcare provider as advised for repeat urine testing and catheter removal or replacement.  When to seek medical advice  Call your healthcare provider right away if any of these occur:  · Fever of 100.4ºF (38ºC) or higher, or as directed by your healthcare provider  · Bladder pain or fullness  · Abdominal swelling, nausea or vomiting, or back pain  · Blood or urine leakage around the catheter  · Bloody urine coming from the catheter (if a new symptom)  · Catheter falls out  · Catheter stops draining for 6 hours  · Weakness, dizziness, or fainting  Date Last Reviewed: 10/1/2016  © 4606-6543 [employer]. [address] [place] [address], [place] [address]. All rights reserved. This information is not intended as a substitute for professional medical care. Always follow your healthcare professional's instructions.        Cystoscopy    Cystoscopy is a procedure that lets your doctor look directly inside your urethra and bladder. It can be used to:  · Help diagnose a problem with your urethra, bladder, or kidneys.  · Take a sample (biopsy) of bladder or urethral tissue.  · Treat certain problems (such as removing kidney stones).  · Place a stent to bypass an obstruction.  · Take special X-rays of the kidneys.  Based on the findings, your doctor may recommend other tests or treatments.  What is a cystoscope?  A cystoscope is a telescope-like instrument that contains lenses and fiberoptics (small glass wires that make bright light). The cystoscope may be straight and rigid, or flexible to bend around curves in the urethra. The doctor may look directly into the cystoscope, or project the image onto a monitor.  Getting ready  · Ask your doctor if you should stop taking any medicines before the procedure.  · Ask whether you should avoid eating or drinking anything after midnight before the procedure.  · Follow any other instructions your doctor gives you.  Tell your doctor  before the exam if you:  · Take any medicines, such as aspirin or blood thinners  · Have allergies to any medicines  · Are pregnant   The procedure  Cystoscopy is done in the doctors office, surgery center, or hospital. The doctor and a nurse are present during the procedure. It takes only a few minutes, longer if a biopsy, X-ray, or treatment needs to be done.  During the procedure:  · You lie on an exam table on your back, knees bent and legs apart. You are covered with a drape.  · Your urethra and the area around it are washed. Anesthetic jelly may be applied to numb the urethra. Other pain medicine is usually not needed. In some cases, you may be offered a mild sedative to help you relax. If a more extensive procedure is to be done, such as a biopsy or kidney stone removal, general anesthesia may be needed.  · The cystoscope is inserted. A sterile fluid is put into the bladder to expand it. You may feel pressure from this fluid.  · When the procedure is done, the cystoscope is removed.  After the procedure  If you had a sedative, general anesthesia, or spinal anesthesia, you must have someone drive you home. Once youre home:  · Drink plenty of fluids.  · You may have burning or light bleeding when you urinate--this is normal.  · Medicines may be prescribed to ease any discomfort or prevent infection. Take these as directed.  · Call your doctor if you have heavy bleeding or blood clots, burning that lasts more than a day, a fever over 100°F  (38° C), or trouble urinating.  Date Last Reviewed: 1/1/2017 © 2000-2017 [employer]. [address] [place] [address], [place] [address]. All rights reserved. This information is not intended as a substitute for professional medical care. Always follow your healthcare professional's instructions.        Urodynamic Studies     The equipment used for the study varies depending upon the facility and what tests are done.     Urodynamic studies may be done in your doctors  office, a clinic, or a hospital. The studies may take up to an hour or more. This depends on which tests your doctor does. The tests are generally painless. You wont need sedating medicine.  Tests that may be done  Uroflowmetry. This measures the amount and speed of urine you void from your bladder. You urinate into a funnel. Its attached to a computer that records your urine flow over time. The amount of urine left in your bladder after you void may also be measured right after this test.  Cystometry. This test evaluates how much your bladder can hold. It also measures how strong your bladder muscle is and how well the signals work that tell you when your bladder is full. Your healthcare provider fills your bladder with sterile water or saline solution, through a catheter. Your doctor will instruct you to report any sensations you feel. Mention if theyre similar to symptoms youve felt at home. Your doctor may ask you to cough, stand and walk, or bear down during this test.  Electromyogram. This helps evaluate the muscle contractions that control urination, such as sphincter muscle contractions. Your healthcare provider may place electrode patches or wires near your rectum or urethra to make the recording. He or she may ask you to try to tighten or relax your sphincter muscles during this test.  Pressure flow study. This test measures your detrusor, urethral, and abdominal pressures. Detrusor is the muscle surrounding the bladder walls that relaxes to allow your bladder to fill, and and contracts to squeeze out urine. A pressure flow study is often done after cystometry. Youre asked to urinate while a probe in your urethra measures pressures.  Video cystourethrography. This takes video pictures of urine flow through your urinary tract. It can help identify blockages or other problems. The bladder is filled with an X-ray contrast fluid. Then X-ray video pictures are taken as the fluid is urinated out. Ultrasound  imaging may also be combined with routine urodynamic studies.  Ambulatory urodynamics. This test can be used to evaluate you while doing usual activities.  Getting your results  After the study, youll get dressed and return to the consultation room. Test results may be ready soon after the study is finished. Or, you may return to your doctors office in a few days for your results. Your doctor can talk with you about the study report and your options.   Date Last Reviewed: 1/1/2017 © 2000-2017 [employer]. [address] [place] [address]. All rights reserved. This information is not intended as a substitute for professional medical care. Always follow your healthcare professional's instructions.        Urodynamics Studies     The bladder holds urine until it leaves the body through the urethra.     Urodynamics studies are a series of tests that give your doctor a close look at the working of your bladder and urethra. The tests can help your doctor learn about any problems storing urine or voiding (eliminating) urine from your body.  Understanding the lower urinary tract  The lower part of the urinary tract has several parts.  · The bladder stores urine until youre ready to release it.  · The urethra is the tube that carries urine from the bladder out of the body.  · The sphincter is made up of muscles around the opening of the bladder. The sphincter muscles tighten to hold urine in the bladder. They relax to let urine flow. Signals from the brain tell the sphincter when to tighten and relax. These signals also tell the bladder when to contract to let urine flow out of the body.  Why you need a urodynamics study  This test may be ordered if you:  · Are incontinent (leak urine)  · Have a bladder that does not empty all the way.  · Have symptoms such as the need to urinate often or a constant strong need to urinate  · Have intermittent or weak urine stream  · Have persistent urinary tract  infections  Preparing for the study  · Tell your doctor about any medicine youre taking. Ask if you should stop them before the study.  · Keep a diary of your bathroom habits. Do this for a few days before the study. This diary can be a helpful part of the evaluation.  · Ask if you need to arrive for the study with a full bladder.  Date Last Reviewed: 1/1/2017  © 5771-7006 [employer]. [address] [place] [address], [place] [address]. All rights reserved. This information is not intended as a substitute for professional medical care. Always follow your healthcare professional's instructions.

## 2020-04-14 NOTE — PROGRESS NOTES
Subjective:       Patient ID: Ladonna Hunter is a 54 y.o. female.    Chief Complaint: Neurogenic Bladder (Chronic castanon catheter)    Ladonna Hunter is a 54 y.o. Diabetic female with a history of Neurogenic Bladder.  She reports a history of urinary incontinence.     Initially seen by Dr. Tsai 2019 after a CT showed bilateral hydro and incomplete bladder emptying.  Office PVR was 250cc  16fr Castanon catheter was placed.  A follow up US showed resolution of hydro and NM Renal Scan revealed no obstruction.    She was scheduled for SUDS/Cysto but was cancelled due to Covid 19 restrictions.  She will manage her her retention with castanon catheter during the meantime; she was not ready for CIC    She is here for a castanon cathter change.  She voicing no complaints.              Past Medical History:  No date: Anemia  No date: CHF (congestive heart failure)  No date: Diabetes mellitus, type 2  No date: Hypertension  No date: Thyroid disease    Past Surgical History:  No date:  SECTION  No date: CHOLECYSTECTOMY  2019: COLONOSCOPY; N/A      Comment:  Procedure: COLONOSCOPY WITH BIOPSY;  Surgeon: Yoseph Mendez MD;  Location: [facility] ENDO;  Service: Colon and                Rectal;  Laterality: N/A;  10/29/2019: COLONOSCOPY; N/A      Comment:  Procedure: COLONOSCOPY;  Surgeon: Yoseph Mendez MD;                 Location: [facility] ENDO (4TH FLR);  Service: Colon and                Rectal;  Laterality: N/A;  No date: TONSILLECTOMY  No date: URETHRA SURGERY    Review of patient's family history indicates:  Problem: Diverticulitis      Relation: Mother          Age of Onset: (Not Specified)  Problem: Diabetes      Relation: Mother          Age of Onset: (Not Specified)  Problem: Heart disease      Relation: Father          Age of Onset: (Not Specified)      Social History    Socioeconomic History      Marital status: Single      Spouse name: Not on file      Number of children: Not on file      Years  of education: Not on file      Highest education level: Not on file    Occupational History      Not on file    Social Needs      Financial resource strain: Not on file      Food insecurity:        Worry: Not on file        Inability: Not on file      Transportation needs:        Medical: Not on file        Non-medical: Not on file    Tobacco Use      Smoking status: Never Smoker      Smokeless tobacco: Never Used    Substance and Sexual Activity      Alcohol use: Yes        Comment: socially once every 3 years      Drug use: No      Sexual activity: Never    Lifestyle      Physical activity:        Days per week: Not on file        Minutes per session: Not on file      Stress: Not on file    Relationships      Social connections:        Talks on phone: Not on file        Gets together: Not on file        Attends [employer] service: Not on file        Active member of club or organization: Not on file        Attends meetings of clubs or organizations: Not on file        Relationship status: Not on file    Other Topics      Concerns:        Not on file    Social History Narrative      Not on file      Allergies:  Patient has no known allergies.    Medications:  Current Outpatient Medications:   amLODIPine (NORVASC) 5 MG tablet, Take 1 tablet by mouth once daily., Disp: , Rfl:   amoxicillin-clavulanate 875-125mg (AUGMENTIN) 875-125 mg per tablet, Take 1 tablet by mouth every 12 (twelve) hours., Disp: 20 tablet, Rfl: 1  blood sugar diagnostic (ACCU-CHEK KENJI PLUS TEST STRP) Strp, QID blood sugars, Disp: 100 each, Rfl: 5  blood sugar diagnostic Strp, QID blood sugar testing, Disp: 100 strip, Rfl: 5   blood-glucose meter (TRUE METRIX GO GLUCOSE METER) Misc, Qid blood sugar testing, Disp: 1 each, Rfl: 0  colestipol (COLESTID) 1 gram Tab, Take 1-4 tablets (1-4 g total) by mouth daily as needed (diarrhea)., Disp: 240 tablet, Rfl: 3  ergocalciferol (ERGOCALCIFEROL) 50,000 unit Cap, Take 1 capsule (50,000 Units total)  "by mouth every 7 days., Disp: 12 capsule, Rfl: 3  escitalopram oxalate (LEXAPRO) 10 MG tablet, Take 1 tablet (10 mg total) by mouth once daily., Disp: 30 tablet, Rfl: 2  fenofibrate micronized (LOFIBRA) 134 MG Cap, Take 1 capsule (134 mg total) by mouth daily with breakfast., Disp: 30 capsule, Rfl: 5  flash glucose scanning reader (FREESTYLE DARIUS 14 DAY READER) Misc, Blood sugar readings AC and HS, Disp: 1 each, Rfl: 6  flash glucose sensor (FREESTYLE DARIUS 14 DAY SENSOR) Kit, Blood sugars AC and HS to slide, Disp: 1 kit, Rfl: 6  glimepiride (AMARYL) 4 MG tablet, TAKE ONE TABLET BY MOUTH TWO TIMES DAILY, Disp: 60 tablet, Rfl: 5  hydroCHLOROthiazide (HYDRODIURIL) 12.5 MG Tab, Take 1 tablet (12.5 mg total) by mouth once daily., Disp: 30 tablet, Rfl: 5  insulin (LANTUS SOLOSTAR U-100 INSULIN) glargine 100 units/mL (3mL) SubQ pen, Inject 30 Units into the skin every evening. (Patient not taking: Reported on 3/16/2020), Disp: 15 mL, Rfl: 1  insulin aspart U-100 (NOVOLOG FLEXPEN U-100 INSULIN) 100 unit/mL (3 mL) InPn pen, Inject 10 Units into the skin 3 (three) times daily with meals. (Patient not taking: Reported on 3/16/2020), Disp: 15 mL, Rfl: 1  lancets (ACCU-CHEK SOFTCLIX LANCETS) Misc, QID blood sugars, Disp: 100 each, Rfl: 5  lancets 32 gauge Misc, QID blood sugar testing, Disp: 100 each, Rfl: 5  levothyroxine (SYNTHROID) 137 MCG Tab tablet, Take 1 tablet (137 mcg total) by mouth before breakfast., Disp: 90 tablet, Rfl: 3  losartan (COZAAR) 100 MG tablet, Take 1 tablet (100 mg total) by mouth once daily., Disp: 30 tablet, Rfl: 5  mupirocin (BACTROBAN) 2 % ointment, Apply topically 3 (three) times daily., Disp: 30 g, Rfl: 1  oxybutynin (DITROPAN-XL) 10 MG 24 hr tablet, Take 1 tablet (10 mg total) by mouth once daily., Disp: 30 tablet, Rfl: 11  pen needle, diabetic 32 gauge x 1/4" Ndle, Use to inject with insulin four times a day, Disp: 100 each, Rfl: 1  torsemide (DEMADEX) 20 MG Tab, Take 1 tablet " (20 mg total) by mouth once daily., Disp: 30 tablet, Rfl: 5            Review of Systems   Constitutional: Negative.  Negative for chills and fever.   HENT: Negative for facial swelling and trouble swallowing.    Eyes: Negative for visual disturbance.   Respiratory: Negative for cough, chest tightness, shortness of breath and wheezing.    Cardiovascular: Negative for chest pain and palpitations.   Gastrointestinal: Negative for abdominal pain, constipation, nausea and vomiting.   Genitourinary: Positive for difficulty urinating. Negative for dysuria, hematuria, urgency and vaginal bleeding.        Good urine flow.  No more urine leaking since castanon was in place.     Musculoskeletal: Negative for gait problem.   Skin: Negative for rash.   Neurological: Negative for dizziness and headaches.   Hematological: Does not bruise/bleed easily.   Psychiatric/Behavioral: Negative for behavioral problems.       Objective:      Physical Exam   Nursing note and vitals reviewed.  Constitutional: She is oriented to person, place, and time. Vital signs are normal. She appears well-developed and well-nourished. She is active and cooperative.   HENT:   Head: Normocephalic.   Right Ear: External ear normal.   Left Ear: External ear normal.   Nose: Nose normal.   Eyes: Conjunctivae and lids are normal. Right eye exhibits no discharge. Left eye exhibits no discharge. No scleral icterus.   Neck: Trachea normal and normal range of motion. Neck supple. No tracheal deviation present.   Cardiovascular: Normal rate and intact distal pulses.    Pulmonary/Chest: Effort normal. No respiratory distress.   Abdominal: Soft. Normal appearance. She exhibits no distension. There is no tenderness. There is no guarding and no CVA tenderness.   Genitourinary: There is no rash, tenderness or lesion on the right labia. There is no rash, tenderness or lesion on the left labia.   Musculoskeletal: Normal range of motion. She exhibits no edema.   Neurological:  She is alert and oriented to person, place, and time.   Skin: Skin is warm, dry and intact.     Psychiatric: She has a normal mood and affect. Her behavior is normal.       Assessment:       1. Encounter for Castanon catheter replacement    2. Urinary retention    3. Atonic bladder    4. Chronic indwelling Castanon catheter        Plan:         I spent 25 minutes with the patient of which more than half was spent in direct consultation with the patient in regards to our treatment and plan.    Education and recommendations of today's plan of care including home remedies.  We dicussed her LUTS and possible contributory factors.  Will know more after her cysto/SUDS  New 16fr castanon was easily replaced using sterile technique.  Tolerated well.  Castanon care discussed  Continue Coronavirus precaution and social distancing.   RTC one month for new castanon placement

## 2020-04-23 ENCOUNTER — TELEPHONE (OUTPATIENT)
Dept: FAMILY MEDICINE | Facility: CLINIC | Age: 55
End: 2020-04-23

## 2020-04-23 NOTE — TELEPHONE ENCOUNTER
Spoke with pt--advised her that we did not receive the paperwork in question. She will have the company fax a copy of it.

## 2020-04-23 NOTE — TELEPHONE ENCOUNTER
----- Message from Beth Perez sent at 2020  4:01 PM CDT -----  Ladonna Hunter  MRN: [id number]  : 1965  PCP: Mayra Denny Forefouzia  Home Phone      [phone]  Work Phone      Not on file.  [employer]          [phone]      MESSAGE:   Patient checking status of disability claim paper work she dropped off last week for Denisha to fill out.     [phone]

## 2020-04-24 ENCOUNTER — PATIENT OUTREACH (OUTPATIENT)
Dept: ADMINISTRATIVE | Facility: OTHER | Age: 55
End: 2020-04-24

## 2020-05-01 ENCOUNTER — TELEPHONE (OUTPATIENT)
Dept: FAMILY MEDICINE | Facility: CLINIC | Age: 55
End: 2020-05-01

## 2020-05-01 NOTE — TELEPHONE ENCOUNTER
----- Message from Fadi Houston sent at 2020 12:01 PM CDT -----  Contact: patient  Ladonna Hunter  MRN: [id number]  : 1965  PCP: Mayra Islas  Home Phone      [phone]  Work Phone      Not on file.  Mobile          [phone]      MESSAGE: called for status of disability paperwork -- please advise    Call [phone]    PCP: Roshan

## 2020-05-11 ENCOUNTER — PATIENT OUTREACH (OUTPATIENT)
Dept: ADMINISTRATIVE | Facility: OTHER | Age: 55
End: 2020-05-11

## 2020-05-11 NOTE — PROGRESS NOTES
LINKS immunization registr failedy, Health Maintenance updated.  Chart reviewed for overdue Proactive [facility] Encoun[facility] health maintenance testing.

## 2020-05-12 ENCOUNTER — OFFICE VISIT (OUTPATIENT)
Dept: UROLOGY | Facility: CLINIC | Age: 55
End: 2020-05-12
Payer: MEDICAID

## 2020-05-12 VITALS
SYSTOLIC BLOOD PRESSURE: 179 MMHG | HEART RATE: 76 BPM | BODY MASS INDEX: 30.08 KG/M2 | DIASTOLIC BLOOD PRESSURE: 76 MMHG | WEIGHT: 180.56 LBS | HEIGHT: 65 IN

## 2020-05-12 DIAGNOSIS — N31.9 NEUROGENIC BLADDER: ICD-10-CM

## 2020-05-12 DIAGNOSIS — R33.9 URINARY RETENTION: ICD-10-CM

## 2020-05-12 DIAGNOSIS — Z46.6 URINARY CATHETER (FOLEY) CHANGE REQUIRED: Primary | ICD-10-CM

## 2020-05-12 PROCEDURE — 87077 CULTURE AEROBIC IDENTIFY: CPT

## 2020-05-12 PROCEDURE — 87086 URINE CULTURE/COLONY COUNT: CPT

## 2020-05-12 PROCEDURE — 87088 URINE BACTERIA CULTURE: CPT

## 2020-05-12 PROCEDURE — 87186 SC STD MICRODIL/AGAR DIL: CPT

## 2020-05-12 PROCEDURE — 99214 OFFICE O/P EST MOD 30 MIN: CPT | Mod: PBBFAC | Performed by: PHYSICIAN ASSISTANT

## 2020-05-12 PROCEDURE — 99213 PR OFFICE/OUTPT VISIT, EST, LEVL III, 20-29 MIN: ICD-10-PCS | Mod: S$PBB,25,, | Performed by: PHYSICIAN ASSISTANT

## 2020-05-12 PROCEDURE — 51702 INSERT TEMP BLADDER CATH: CPT | Mod: S$PBB,,, | Performed by: PHYSICIAN ASSISTANT

## 2020-05-12 PROCEDURE — 51702 PR INSERTION OF TEMPORARY INDWELLING BLADDER CATHETER, SIMPLE: ICD-10-PCS | Mod: S$PBB,,, | Performed by: PHYSICIAN ASSISTANT

## 2020-05-12 PROCEDURE — 99213 OFFICE O/P EST LOW 20 MIN: CPT | Mod: S$PBB,25,, | Performed by: PHYSICIAN ASSISTANT

## 2020-05-12 PROCEDURE — 99999 PR PBB SHADOW E&M-EST. PATIENT-LVL IV: ICD-10-PCS | Mod: PBBFAC,,, | Performed by: PHYSICIAN ASSISTANT

## 2020-05-12 PROCEDURE — 51702 INSERT TEMP BLADDER CATH: CPT | Mod: PBBFAC | Performed by: PHYSICIAN ASSISTANT

## 2020-05-12 PROCEDURE — 99999 PR PBB SHADOW E&M-EST. PATIENT-LVL IV: CPT | Mod: PBBFAC,,, | Performed by: PHYSICIAN ASSISTANT

## 2020-05-12 RX ORDER — LIDOCAINE HYDROCHLORIDE 20 MG/ML
JELLY TOPICAL ONCE
Status: CANCELLED | OUTPATIENT
Start: 2020-05-12 | End: 2020-05-12

## 2020-05-12 RX ORDER — DOXYCYCLINE HYCLATE 100 MG
100 TABLET ORAL ONCE
Status: CANCELLED | OUTPATIENT
Start: 2020-05-12 | End: 2020-05-12

## 2020-05-12 RX ORDER — CIPROFLOXACIN 250 MG/1
500 TABLET, FILM COATED ORAL ONCE
Status: CANCELLED | OUTPATIENT
Start: 2020-05-12 | End: 2020-05-12

## 2020-05-12 NOTE — PROGRESS NOTES
CHIEF COMPLAINT:    Mrs. Hunter is a 54 y.o. female presenting for castanon catheter change.  PRESENTING ILLNESS:    Ladonna Hunter is a 54 y.o. female with a PMH of neurogenic bladder, DM, HTN urinary incontinence, incomplete bladder emptying who presents for castanon catheter change.     Initially seen by Dr. Tsai 2019 after a CT showed bilateral hydro and incomplete bladder emptying.  Office PVR was 250cc  16fr Castanon catheter was placed.  A follow up US showed resolution of hydro and NM Renal Scan revealed no obstruction.     She was scheduled for SUDS/Cysto but was cancelled due to Covid 19 restrictions.    She has been managing her retention with castanon catheter in the meantime; she was not ready for CIC     She is here for a castanon catheter change.  She states that her catheter has been draining well.  She had a bladder spasm today.  She is taking oxybutynin.       REVIEW OF SYSTEMS:    Constitutional: Negative for fever and chills.   HENT: Negative for hearing loss.   Eyes: Negative for visual disturbance.   Respiratory: Negative for shortness of breath.   Cardiovascular: Negative for chest pain.   Gastrointestinal: Negative for vomiting, and constipation.   Genitourinary:  See HPI  Neurological: Negative for dizziness.   Hematological: Does not bruise/bleed easily.   Psychiatric/Behavioral: Negative for confusion.     PATIENT HISTORY:    Past Medical History:   Diagnosis Date    Anemia     CHF (congestive heart failure)     Diabetes mellitus, type 2     Hypertension     Thyroid disease        Past Surgical History:   Procedure Laterality Date     SECTION      CHOLECYSTECTOMY      COLONOSCOPY N/A 2019    Procedure: COLONOSCOPY WITH BIOPSY;  Surgeon: Yoseph Mendez MD;  Location: [facility];  Service: Colon and Rectal;  Laterality: N/A;    COLONOSCOPY N/A 10/29/2019    Procedure: COLONOSCOPY;  Surgeon: Yoseph Mendez MD;  Location: [facility] ([address] [place] [address]);  Service: Colon and Rectal;   Laterality: N/A;    TONSILLECTOMY      URETHRA SURGERY         Family History   Problem Relation Age of Onset    Diverticulitis Mother     Diabetes Mother     Heart disease Father        Social History     Socioeconomic History    Marital status: Single     Spouse name: Not on file    Number of children: Not on file    Years of education: Not on file    Highest education level: Not on file   Occupational History    Not on file   Social Needs    Financial resource strain: Not on file    Food insecurity:     Worry: Not on file     Inability: Not on file    Transportation needs:     Medical: Not on file     Non-medical: Not on file   Tobacco Use    Smoking status: Never Smoker    Smokeless tobacco: Never Used   Substance and Sexual Activity    Alcohol use: Yes     Comment: socially once every 3 years    Drug use: No    Sexual activity: Never   Lifestyle    Physical activity:     Days per week: Not on file     Minutes per session: Not on file    Stress: Not on file   Relationships    Social connections:     Talks on phone: Not on file     Gets together: Not on file     Attends Mandaen service: Not on file     Active member of club or organization: Not on file     Attends meetings of clubs or organizations: Not on file     Relationship status: Not on file   Other Topics Concern    Not on file   Social History Narrative    Not on file       Allergies:  Patient has no known allergies.    Medications:    Current Outpatient Medications:     amLODIPine (NORVASC) 5 MG tablet, Take 1 tablet by mouth once daily., Disp: , Rfl:     amoxicillin-clavulanate 875-125mg (AUGMENTIN) 875-125 mg per tablet, Take 1 tablet by mouth every 12 (twelve) hours., Disp: 20 tablet, Rfl: 1    blood sugar diagnostic (ACCU-CHEK KENJI PLUS TEST STRP) Strp, Inject 1 each into the skin once daily., Disp: 100 each, Rfl: 3    colestipol (COLESTID) 1 gram Tab, Take 1-4 tablets (1-4 g total) by mouth daily as needed (diarrhea).,  "Disp: 240 tablet, Rfl: 3    ergocalciferol (ERGOCALCIFEROL) 50,000 unit Cap, Take 1 capsule (50,000 Units total) by mouth every 7 days. for 12 doses, Disp: 12 capsule, Rfl: 0    escitalopram oxalate (LEXAPRO) 10 MG tablet, Take 1 tablet (10 mg total) by mouth once daily., Disp: 30 tablet, Rfl: 2    fenofibrate micronized (LOFIBRA) 134 MG Cap, Take 1 capsule (134 mg total) by mouth daily with breakfast., Disp: 30 capsule, Rfl: 5    flash glucose scanning reader (FREESTYLE DARIUS 14 DAY READER) Misc, Blood sugar readings AC and HS, Disp: 1 each, Rfl: 6    flash glucose sensor (FREESTYLE DARIUS 14 DAY SENSOR) Kit, Blood sugars AC and HS to slide, Disp: 1 kit, Rfl: 6    glimepiride (AMARYL) 4 MG tablet, Take 1 tablet (4 mg total) by mouth 2 (two) times daily., Disp: 180 tablet, Rfl: 3    hydroCHLOROthiazide (HYDRODIURIL) 12.5 MG Tab, Take 1 tablet (12.5 mg total) by mouth once daily., Disp: 30 tablet, Rfl: 5    lancets (ACCU-CHEK SOFTCLIX LANCETS) Misc, Inject 1 each into the skin once daily., Disp: 100 each, Rfl: 3    levothyroxine (SYNTHROID) 137 MCG Tab tablet, Take 1 tablet (137 mcg total) by mouth before breakfast., Disp: 90 tablet, Rfl: 3    losartan (COZAAR) 100 MG tablet, Take 1 tablet (100 mg total) by mouth once daily., Disp: 30 tablet, Rfl: 5    mupirocin (BACTROBAN) 2 % ointment, Apply topically 3 (three) times daily., Disp: 30 g, Rfl: 1    oxybutynin (DITROPAN-XL) 10 MG 24 hr tablet, Take 1 tablet (10 mg total) by mouth once daily., Disp: 30 tablet, Rfl: 11    pen needle, diabetic 32 gauge x 1/4" Ndle, Use to inject with insulin four times a day, Disp: 100 each, Rfl: 1    torsemide (DEMADEX) 20 MG Tab, Take 1 tablet (20 mg total) by mouth once daily., Disp: 30 tablet, Rfl: 5    PHYSICAL EXAMINATION:    Constitutional: She appears well-developed and well-nourished.  She is in no apparent distress.    Eyes: No scleral icterus noted bilaterally. No discharge bilaterally.    Nose: No " rhinorrhea    Cardiovascular: Normal rate.      Pulmonary/Chest: Effort normal. No respiratory distress.     Abdominal:  She exhibits no distension.  T    Neurological: She is alert and oriented to person, place, and time.     Skin: Skin is warm and dry.     Psych: Cooperative with normal affect.    Physical Exam      LABS:    IMPRESSION:    Encounter Diagnoses   Name Primary?    Urinary catheter (Castanon) change required Yes    Urinary retention     Neurogenic bladder        PLAN:    16fr castanon catheter changed by Nurse Shweta.    Will schedule her for Soniya/cysto in 2 weeks. .   urine culture today.  I will treat prior to urodynamics if positive.     Follow up for urodynamics on 5/25         Jossy Booker PA-C

## 2020-05-14 LAB — BACTERIA UR CULT: ABNORMAL

## 2020-05-15 RX ORDER — AMOXICILLIN AND CLAVULANATE POTASSIUM 875; 125 MG/1; MG/1
1 TABLET, FILM COATED ORAL 2 TIMES DAILY
Qty: 14 TABLET | Refills: 0 | Status: SHIPPED | OUTPATIENT
Start: 2020-05-15 | End: 2020-05-22

## 2020-05-20 PROBLEM — R06.09 DOE (DYSPNEA ON EXERTION): Status: ACTIVE | Noted: 2020-05-20

## 2020-05-25 ENCOUNTER — PROCEDURE VISIT (OUTPATIENT)
Dept: UROLOGY | Facility: CLINIC | Age: 55
End: 2020-05-25
Payer: MEDICAID

## 2020-05-25 VITALS
HEART RATE: 69 BPM | SYSTOLIC BLOOD PRESSURE: 167 MMHG | TEMPERATURE: 98 F | HEIGHT: 65 IN | DIASTOLIC BLOOD PRESSURE: 72 MMHG | WEIGHT: 177 LBS | BODY MASS INDEX: 29.49 KG/M2

## 2020-05-25 DIAGNOSIS — R33.9 URINARY RETENTION: ICD-10-CM

## 2020-05-25 PROCEDURE — 51784 ANAL/URINARY MUSCLE STUDY: CPT | Mod: PBBFAC | Performed by: UROLOGY

## 2020-05-25 PROCEDURE — 52000 CYSTOURETHROSCOPY: CPT | Mod: PBBFAC | Performed by: UROLOGY

## 2020-05-25 PROCEDURE — 52000 PR CYSTOURETHROSCOPY: ICD-10-PCS | Mod: S$PBB,59,, | Performed by: UROLOGY

## 2020-05-25 PROCEDURE — 51728 CYSTOMETROGRAM W/VP: CPT | Mod: 26,S$PBB,, | Performed by: UROLOGY

## 2020-05-25 PROCEDURE — 51728 PR COMPLEX CYSTOMETROGRAM VOIDING PRESSURE STUDIES: ICD-10-PCS | Mod: 26,S$PBB,, | Performed by: UROLOGY

## 2020-05-25 PROCEDURE — 51784 ANAL/URINARY MUSCLE STUDY: CPT | Mod: 26,S$PBB,51, | Performed by: UROLOGY

## 2020-05-25 PROCEDURE — 51741 ELECTRO-UROFLOWMETRY FIRST: CPT | Mod: PBBFAC | Performed by: UROLOGY

## 2020-05-25 PROCEDURE — 51728 CYSTOMETROGRAM W/VP: CPT | Mod: PBBFAC | Performed by: UROLOGY

## 2020-05-25 PROCEDURE — 51741 PR UROFLOWMETRY, COMPLEX: ICD-10-PCS | Mod: 26,S$PBB,51, | Performed by: UROLOGY

## 2020-05-25 PROCEDURE — 52000 CYSTOURETHROSCOPY: CPT | Mod: S$PBB,59,, | Performed by: UROLOGY

## 2020-05-25 PROCEDURE — 51741 ELECTRO-UROFLOWMETRY FIRST: CPT | Mod: 26,S$PBB,51, | Performed by: UROLOGY

## 2020-05-25 PROCEDURE — 51784 PR ANAL/URINARY MUSCLE STUDY: ICD-10-PCS | Mod: 26,S$PBB,51, | Performed by: UROLOGY

## 2020-05-25 NOTE — PATIENT INSTRUCTIONS
SIMPLE URODYNAMIC STUDY (SUDS) & CYSTOSCOPY  UROLOGY CLINIC DISCHARGE INSTRUCTIONS    You have had a procedure that will require time to properly heal. Follow the instructions you have been given on how to care for yourself once you are home. Below is additional information to help in your recovery.    ACTIVITY  · There are no restrictions in activity. Start doing again the things you did before the procedure.  · You may experience a slight burning sensation. You may notice a small amount of blood in your urine. This will clear up within a day. Call the clinic if this continues beyond 48 hours.    DIET  · Continue your normal diet. You may eat the same foods you ate before your procedure.  · Drink plenty of fluids during the first 24-48 hours following your procedure.    MEDICATIONS  · Resume all other previous medications from your prescribing physician.  · Continue any pre=procedure antibiotics until they are all gone.    SIGNS AND SYMPTOMS TO REPORT TO THE DOCTOR  · Chills or fever greater than 101° F within 24 hours of procedure.  · Changes in urination, such as increased bleeding, foul smell, cloudy urine, or painful urination.  · Call your doctor with any questions or concerns.    For any emergency situation, call [phone] immediately or go to your nearest emergency room.    [facility]  [phone]    _                                                                                                                                                                                             If any problems after hours or weekends, you may call [phone] and ask for the urology resident on call.

## 2020-05-25 NOTE — PROCEDURES
Simple Urodynamics w/ Cysto  Date/Time: 5/25/2020 2:15 PM  Performed by: Stephan Tsai Jr., MD  Authorized by: Jossy Booker PA-C   Preparation: Patient was prepped and draped in the usual sterile fashion.  Local anesthesia used: no    Anesthesia:  Local anesthesia used: no    Sedation:  Patient sedated: no    Patient tolerance: Patient tolerated the procedure well with no immediate complications            Preop diagnosis neurogenic bladder  Postop diagnosis low pressure bladder  Possible urinary retention  Operation performed cystoscopy simple urodynamics  Surgeon Quin  Patient had urinary retention and has lost 35 lb with cardiac and urological management.  A 2 catheter CMG study was performed using subtracted rectal pressure monitoring a 2 catheter CMG study was performed using subtracted rectal pressure monitoring.  Sterile water was used as a medium at 30 cc per minute 1st sensation was at 76 cc capacity was 164 cc the patient voided with a low bladder pressure of 29 cm of water.  She had an elevated residual of 100 cc.  Flexible cystoscopy was then performed there were no stones tumors foreign bodies or active infections noted the urethra and bladder neck were unremarkable.  Both ureteral orifices were normal size shape position.  Impression low pressure bladder versus neurogenic bladder.  Patient's diabetes is in much better control and has been in the past  For recommendations I decided to leave the catheter out and the patient will continue her antibiotics and I will see her back in 48 hr for a postvoid residual urine and possible Castro catheter replacement and [place] on Wednesday

## 2020-05-26 ENCOUNTER — PATIENT OUTREACH (OUTPATIENT)
Dept: ADMINISTRATIVE | Facility: OTHER | Age: 55
End: 2020-05-26

## 2020-05-27 ENCOUNTER — PATIENT MESSAGE (OUTPATIENT)
Dept: UROLOGY | Facility: CLINIC | Age: 55
End: 2020-05-27

## 2020-05-27 ENCOUNTER — OFFICE VISIT (OUTPATIENT)
Dept: UROLOGY | Facility: CLINIC | Age: 55
End: 2020-05-27
Attending: UROLOGY
Payer: MEDICAID

## 2020-05-27 VITALS
TEMPERATURE: 98 F | DIASTOLIC BLOOD PRESSURE: 86 MMHG | BODY MASS INDEX: 29.75 KG/M2 | HEIGHT: 65 IN | WEIGHT: 178.56 LBS | RESPIRATION RATE: 16 BRPM | SYSTOLIC BLOOD PRESSURE: 180 MMHG

## 2020-05-27 DIAGNOSIS — R33.9 RETENTION OF URINE: Primary | ICD-10-CM

## 2020-05-27 PROCEDURE — 99999 PR PBB SHADOW E&M-EST. PATIENT-LVL III: ICD-10-PCS | Mod: PBBFAC,,, | Performed by: UROLOGY

## 2020-05-27 PROCEDURE — 99999 PR PBB SHADOW E&M-EST. PATIENT-LVL III: CPT | Mod: PBBFAC,,, | Performed by: UROLOGY

## 2020-05-27 PROCEDURE — 99213 OFFICE O/P EST LOW 20 MIN: CPT | Mod: PBBFAC | Performed by: UROLOGY

## 2020-05-27 PROCEDURE — 99213 OFFICE O/P EST LOW 20 MIN: CPT | Mod: S$PBB,,, | Performed by: UROLOGY

## 2020-05-27 PROCEDURE — 99213 PR OFFICE/OUTPT VISIT, EST, LEVL III, 20-29 MIN: ICD-10-PCS | Mod: S$PBB,,, | Performed by: UROLOGY

## 2020-05-27 RX ORDER — HYDROCODONE BITARTRATE AND ACETAMINOPHEN 5; 325 MG/1; MG/1
TABLET ORAL
Status: ON HOLD | COMMUNITY
Start: 2020-05-14 | End: 2020-06-04 | Stop reason: HOSPADM

## 2020-05-27 NOTE — PROGRESS NOTES
Subjective:       Patient ID: Ladonna Hunter is a 54 y.o. female.    Chief Complaint: Follow-up    HPI patient had urinary retention and had the catheter removed the other day.  She is here for postvoid residual.  Her urine is clear and her postvoid residual was 130 cc.  I am going to leave the catheter out and see her back in 2 weeks with a PVR.  I am also going to stop the Ditropan that she has been taking.  She states that she has had incontinence and she may need to see 1 of our specialist    Past Medical History:   Diagnosis Date    Anemia     CHF (congestive heart failure)     Diabetes mellitus, type 2     Hypertension     Thyroid disease        Past Surgical History:   Procedure Laterality Date     SECTION      CHOLECYSTECTOMY      COLONOSCOPY N/A 2019    Procedure: COLONOSCOPY WITH BIOPSY;  Surgeon: Yoseph Mendez MD;  Location: [facility];  Service: Colon and Rectal;  Laterality: N/A;    COLONOSCOPY N/A 10/29/2019    Procedure: COLONOSCOPY;  Surgeon: Yoseph Mendez MD;  [facility]: [facility] ([facility]);  Service: Colon and Rectal;  Laterality: N/A;    TONSILLECTOMY      URETHRA SURGERY         Family History   Problem Relation Age of Onset    Diverticulitis Mother     Diabetes Mother     Heart disease Father        Social History     Socioeconomic History    Marital status: Single     Spouse name: Not on file    Number of children: Not on file    Years of education: Not on file    Highest education level: Not on file   Occupational History    Not on file   Social Needs    Financial resource strain: Not on file    Food insecurity:     Worry: Not on file     Inability: Not on file    Transportation needs:     Medical: Not on file     Non-medical: Not on file   Tobacco Use    Smoking status: Never Smoker    Smokeless tobacco: Never Used   Substance and Sexual Activity    Alcohol use: Yes     Comment: socially once every 3 years    Drug use: No    Sexual activity: Never    Lifestyle    Physical activity:     Days per week: Not on file     Minutes per session: Not on file    Stress: Not on file   Relationships    Social connections:     Talks on phone: Not on file     Gets together: Not on file     Attends [employer] service: Not on file     Active member of club or organization: Not on file     Attends meetings of clubs or organizations: Not on file     Relationship status: Not on file   Other Topics Concern    Not on file   Social History Narrative    Not on file       Allergies:  Patient has no known allergies.    Medications:    Current Outpatient Medications:     ALPRAZolam (XANAX) 0.5 MG tablet, Take 0.5 mg by mouth once. Take 1 tablet by mouth 30 minutes prior to procedure and may repeat x's 1., Disp: , Rfl:     amLODIPine (NORVASC) 5 MG tablet, Take 1 tablet by mouth once daily., Disp: , Rfl:     aspirin (ECOTRIN) 81 MG EC tablet, Take 81 mg by mouth once daily. Delayed release, Disp: , Rfl:     blood sugar diagnostic (ACCU-CHEK KENJI PLUS TEST STRP) Strp, Inject 1 each into the skin once daily., Disp: 100 each, Rfl: 3    colestipol (COLESTID) 1 gram Tab, Take 1-4 tablets (1-4 g total) by mouth daily as needed (diarrhea)., Disp: 240 tablet, Rfl: 3    ergocalciferol (ERGOCALCIFEROL) 50,000 unit Cap, Take 1 capsule (50,000 Units total) by mouth every 7 days. for 12 doses, Disp: 12 capsule, Rfl: 0    escitalopram oxalate (LEXAPRO) 10 MG tablet, Take 1 tablet (10 mg total) by mouth once daily., Disp: 30 tablet, Rfl: 2    fenofibrate micronized (LOFIBRA) 134 MG Cap, Take 1 capsule (134 mg total) by mouth daily with breakfast., Disp: 30 capsule, Rfl: 5    flash glucose scanning reader (FREESTYLE DARIUS 14 DAY READER) Misc, Blood sugar readings AC and HS, Disp: 1 each, Rfl: 6    flash glucose sensor (FREESTYLE DARIUS 14 DAY SENSOR) Kit, Blood sugars AC and HS to slide, Disp: 1 kit, Rfl: 6    glimepiride (AMARYL) 4 MG tablet, Take 1 tablet (4 mg total) by mouth 2 (two)  "times daily., Disp: 180 tablet, Rfl: 3    hydroCHLOROthiazide (HYDRODIURIL) 12.5 MG Tab, Take 1 tablet (12.5 mg total) by mouth once daily., Disp: 30 tablet, Rfl: 5    HYDROcodone-acetaminophen (NORCO) 5-325 mg per tablet, , Disp: , Rfl:     lancets (ACCU-CHEK SOFTCLIX LANCETS) Misc, Inject 1 each into the skin once daily., Disp: 100 each, Rfl: 3    levothyroxine (SYNTHROID) 125 MCG tablet, Take 125 mcg by mouth before breakfast., Disp: , Rfl:     levothyroxine (SYNTHROID) 137 MCG Tab tablet, Take 1 tablet (137 mcg total) by mouth before breakfast., Disp: 90 tablet, Rfl: 3    losartan (COZAAR) 100 MG tablet, Take 1 tablet (100 mg total) by mouth once daily., Disp: 30 tablet, Rfl: 5    metoprolol tartrate (LOPRESSOR) 25 MG tablet, Take 25 mg by mouth 2 (two) times daily., Disp: , Rfl:     mupirocin (BACTROBAN) 2 % ointment, Apply topically 3 (three) times daily., Disp: 30 g, Rfl: 1    nitroGLYCERIN (NITROSTAT) 0.4 MG SL tablet, Place 0.4 mg under the tongue every 5 (five) minutes as needed for Chest pain., Disp: , Rfl:     oxybutynin (DITROPAN-XL) 10 MG 24 hr tablet, Take 1 tablet (10 mg total) by mouth once daily., Disp: 30 tablet, Rfl: 11    pen needle, diabetic 32 gauge x 1/4" Ndle, Use to inject with insulin four times a day, Disp: 100 each, Rfl: 1    torsemide (DEMADEX) 20 MG Tab, Take 1 tablet (20 mg total) by mouth once daily., Disp: 30 tablet, Rfl: 5    Review of Systems   Constitutional: Negative.    HENT: Negative.    Eyes: Negative.    Respiratory: Negative.    Endocrine: Negative.    Genitourinary: Negative.    Musculoskeletal: Negative.    Allergic/Immunologic: Negative.    Neurological: Negative.    Hematological: Negative.    Psychiatric/Behavioral: Negative.        Objective:      Physical Exam   Constitutional: She appears well-developed.   HENT:   Head: Normocephalic.   Cardiovascular: Normal rate.    Pulmonary/Chest: Effort normal.   Abdominal: Soft.   Musculoskeletal: Normal range of " motion.   Neurological: She is alert.   Skin: Skin is warm.         Assessment:       1. Retention of urine        Plan:       Ladonna was seen today for follow-up.    Diagnoses and all orders for this visit:    Retention of urine     Patient is voiding I will see her back in 2 weeks with a postvoid residual or sooner p.r.n. fairly well I am going to stop the Ditropan that she has been taking

## 2020-06-02 ENCOUNTER — LAB VISIT (OUTPATIENT)
Dept: FAMILY MEDICINE | Facility: CLINIC | Age: 55
End: 2020-06-02
Payer: MEDICAID

## 2020-06-02 ENCOUNTER — TELEPHONE (OUTPATIENT)
Dept: FAMILY MEDICINE | Facility: CLINIC | Age: 55
End: 2020-06-02

## 2020-06-02 DIAGNOSIS — Z01.818 PREOPERATIVE TESTING: ICD-10-CM

## 2020-06-02 PROCEDURE — U0003 INFECTIOUS AGENT DETECTION BY NUCLEIC ACID (DNA OR RNA); SEVERE ACUTE RESPIRATORY SYNDROME CORONAVIRUS 2 (SARS-COV-2) (CORONAVIRUS DISEASE [COVID-19]), AMPLIFIED PROBE TECHNIQUE, MAKING USE OF HIGH THROUGHPUT TECHNOLOGIES AS DESCRIBED BY CMS-2020-01-R: HCPCS

## 2020-06-02 NOTE — TELEPHONE ENCOUNTER
Ladonna Hunter (Key: [id number]) - [id number]  Fenofibrate 134MG capsules  Status: PA Response - Approved  Created: May 6th, 2020 [id number]  Sent: June 2nd, 2020

## 2020-06-03 LAB — SARS-COV-2 RNA RESP QL NAA+PROBE: NOT DETECTED

## 2020-06-04 PROBLEM — R06.02 SOB (SHORTNESS OF BREATH): Status: ACTIVE | Noted: 2020-06-04

## 2020-06-05 ENCOUNTER — PATIENT OUTREACH (OUTPATIENT)
Dept: ADMINISTRATIVE | Facility: OTHER | Age: 55
End: 2020-06-05

## 2020-06-10 ENCOUNTER — OFFICE VISIT (OUTPATIENT)
Dept: UROLOGY | Facility: CLINIC | Age: 55
End: 2020-06-10
Attending: UROLOGY
Payer: MEDICAID

## 2020-06-10 VITALS — WEIGHT: 177 LBS | RESPIRATION RATE: 16 BRPM | HEIGHT: 65 IN | BODY MASS INDEX: 29.49 KG/M2

## 2020-06-10 DIAGNOSIS — N30.00 ACUTE CYSTITIS WITHOUT HEMATURIA: Primary | ICD-10-CM

## 2020-06-10 PROCEDURE — 87088 URINE BACTERIA CULTURE: CPT

## 2020-06-10 PROCEDURE — 99214 OFFICE O/P EST MOD 30 MIN: CPT | Mod: S$PBB,,, | Performed by: UROLOGY

## 2020-06-10 PROCEDURE — 99214 OFFICE O/P EST MOD 30 MIN: CPT | Mod: PBBFAC | Performed by: UROLOGY

## 2020-06-10 PROCEDURE — 87186 SC STD MICRODIL/AGAR DIL: CPT

## 2020-06-10 PROCEDURE — 99999 PR PBB SHADOW E&M-EST. PATIENT-LVL IV: CPT | Mod: PBBFAC,,, | Performed by: UROLOGY

## 2020-06-10 PROCEDURE — 99214 PR OFFICE/OUTPT VISIT, EST, LEVL IV, 30-39 MIN: ICD-10-PCS | Mod: S$PBB,,, | Performed by: UROLOGY

## 2020-06-10 PROCEDURE — 87086 URINE CULTURE/COLONY COUNT: CPT

## 2020-06-10 PROCEDURE — 99999 PR PBB SHADOW E&M-EST. PATIENT-LVL IV: ICD-10-PCS | Mod: PBBFAC,,, | Performed by: UROLOGY

## 2020-06-10 PROCEDURE — 87077 CULTURE AEROBIC IDENTIFY: CPT

## 2020-06-10 RX ORDER — CIPROFLOXACIN 500 MG/1
500 TABLET ORAL 2 TIMES DAILY
Qty: 20 TABLET | Refills: 0 | Status: SHIPPED | OUTPATIENT
Start: 2020-06-10 | End: 2020-07-10

## 2020-06-10 NOTE — PROGRESS NOTES
Subjective:       Patient ID: Ladonna Hunter is a 54 y.o. female.    Chief Complaint: Urinary Frequency   patient had her catheter removed and she was voiding well.  She started having urgency and urge incontinence.  Her postvoid residual today is 150 cc which is acceptable but her urine looks infected.  Going to send a urine culture and start her on some antibiotics.  She had no evidence of upper tract obstruction but that was with a catheter and place so I am going to repeat renal ultrasound with the catheter out.    Past Medical History:   Diagnosis Date    Anemia     CHF (congestive heart failure)     Diabetes mellitus, type 2     Hypertension     Thyroid disease        Past Surgical History:   Procedure Laterality Date     SECTION      CHOLECYSTECTOMY      COLONOSCOPY N/A 2019    Procedure: COLONOSCOPY WITH BIOPSY;  Surgeon: Yoseph Mendez MD;  Location: [facility];  Service: Colon and Rectal;  Laterality: N/A;    COLONOSCOPY N/A 10/29/2019    Procedure: COLONOSCOPY;  Surgeon: Yoseph Mendez MD;  Location: [facility] ([facility]);  Service: Colon and Rectal;  Laterality: N/A;    TONSILLECTOMY      URETHRA SURGERY         Family History   Problem Relation Age of Onset    Diverticulitis Mother     Diabetes Mother     Heart disease Father        Social History     Socioeconomic History    Marital status: Single     Spouse name: Not on file    Number of children: Not on file    Years of education: Not on file    Highest education level: Not on file   Occupational History    Not on file   Social Needs    Financial resource strain: Not on file    Food insecurity:     Worry: Not on file     Inability: Not on file    Transportation needs:     Medical: Not on file     Non-medical: Not on file   Tobacco Use    Smoking status: Never Smoker    Smokeless tobacco: Never Used   Substance and Sexual Activity    Alcohol use: Yes     Comment: socially once every 3 years    Drug use: No     Sexual activity: Never   Lifestyle    Physical activity:     Days per week: Not on file     Minutes per session: Not on file    Stress: Not on file   Relationships    Social connections:     Talks on phone: Not on file     Gets together: Not on file     Attends [employer] service: Not on file     Active member of club or organization: Not on file     Attends meetings of clubs or organizations: Not on file     Relationship status: Not on file   Other Topics Concern    Not on file   Social History Narrative    Not on file       Allergies:  Patient has no known allergies.    Medications:    Current Outpatient Medications:     amLODIPine (NORVASC) 5 MG tablet, Take 1 tablet by mouth once daily., Disp: , Rfl:     aspirin (ECOTRIN) 81 MG EC tablet, Take 81 mg by mouth once daily. Delayed release, Disp: , Rfl:     blood sugar diagnostic (ACCU-CHEK KENJI PLUS TEST STRP) Strp, Inject 1 each into the skin once daily., Disp: 100 each, Rfl: 3    ciprofloxacin HCl (CIPRO) 500 MG tablet, Take 1 tablet (500 mg total) by mouth 2 (two) times daily., Disp: 20 tablet, Rfl: 0    colestipol (COLESTID) 1 gram Tab, Take 1-4 tablets (1-4 g total) by mouth daily as needed (diarrhea)., Disp: 240 tablet, Rfl: 3    ergocalciferol (ERGOCALCIFEROL) 50,000 unit Cap, Take 1 capsule (50,000 Units total) by mouth every 7 days. for 12 doses, Disp: 12 capsule, Rfl: 0    escitalopram oxalate (LEXAPRO) 10 MG tablet, Take 1 tablet (10 mg total) by mouth once daily., Disp: 30 tablet, Rfl: 2    fenofibrate micronized (LOFIBRA) 134 MG Cap, Take 1 capsule (134 mg total) by mouth daily with breakfast., Disp: 30 capsule, Rfl: 5    flash glucose scanning reader (FREESTYLE DARIUS 14 DAY READER) Misc, Blood sugar readings AC and HS, Disp: 1 each, Rfl: 6    flash glucose sensor (FREESTYLE DARIUS 14 DAY SENSOR) Kit, Blood sugars AC and HS to slide, Disp: 1 kit, Rfl: 6    glimepiride (AMARYL) 4 MG tablet, Take 1 tablet (4 mg total) by mouth 2 (two)  "times daily., Disp: 180 tablet, Rfl: 3    hydroCHLOROthiazide (HYDRODIURIL) 12.5 MG Tab, Take 1 tablet (12.5 mg total) by mouth once daily., Disp: 30 tablet, Rfl: 5    lancets (ACCU-CHEK SOFTCLIX LANCETS) Misc, Inject 1 each into the skin once daily., Disp: 100 each, Rfl: 3    levothyroxine (SYNTHROID) 137 MCG Tab tablet, Take 1 tablet (137 mcg total) by mouth before breakfast., Disp: 90 tablet, Rfl: 3    losartan (COZAAR) 100 MG tablet, Take 1 tablet (100 mg total) by mouth once daily., Disp: 30 tablet, Rfl: 5    metoprolol tartrate (LOPRESSOR) 25 MG tablet, Take 25 mg by mouth 2 (two) times daily., Disp: , Rfl:     nitroGLYCERIN (NITROSTAT) 0.4 MG SL tablet, Place 0.4 mg under the tongue every 5 (five) minutes as needed for Chest pain., Disp: , Rfl:     oxybutynin (DITROPAN-XL) 10 MG 24 hr tablet, Take 1 tablet (10 mg total) by mouth once daily., Disp: 30 tablet, Rfl: 11    pen needle, diabetic 32 gauge x 1/4" Ndle, Use to inject with insulin four times a day, Disp: 100 each, Rfl: 1    torsemide (DEMADEX) 20 MG Tab, Take 1 tablet (20 mg total) by mouth once daily., Disp: 30 tablet, Rfl: 5    Review of Systems   Constitutional: Negative.    HENT: Negative.    Eyes: Negative.    Respiratory: Negative.    Endocrine: Negative.    Genitourinary: Positive for urgency.   Musculoskeletal: Negative.    Allergic/Immunologic: Negative.    Neurological: Negative.    Hematological: Negative.    Psychiatric/Behavioral: Negative.        Objective:      Physical Exam   Constitutional: She appears well-developed.   HENT:   Head: Normocephalic.   Cardiovascular: Normal rate.    Pulmonary/Chest: Effort normal.   Abdominal: Soft.   Musculoskeletal: Normal range of motion.   Neurological: She is alert.   Skin: Skin is warm.         Assessment:       1. Acute cystitis without hematuria        Plan:       Ladonna was seen today for urinary frequency.    Diagnoses and all orders for this visit:    Acute cystitis without " hematuria  -     US Retroperitoneal Complete (Kidney and; Future  -     CULTURE, URINE    Other orders  -     ciprofloxacin HCl (CIPRO) 500 MG tablet; Take 1 tablet (500 mg total) by mouth 2 (two) times daily.        return to clinic 3 weeks or sooner p.r.n.

## 2020-06-12 ENCOUNTER — HOSPITAL ENCOUNTER (OUTPATIENT)
Dept: RADIOLOGY | Facility: HOSPITAL | Age: 55
Discharge: HOME | End: 2020-06-12
Attending: UROLOGY
Payer: MEDICAID

## 2020-06-12 DIAGNOSIS — N30.00 ACUTE CYSTITIS WITHOUT HEMATURIA: ICD-10-CM

## 2020-06-12 PROCEDURE — 76770 US RETROPERITONEAL COMPLETE: ICD-10-PCS | Mod: 26,,, | Performed by: RADIOLOGY

## 2020-06-12 PROCEDURE — 76770 US EXAM ABDO BACK WALL COMP: CPT | Mod: TC

## 2020-06-12 PROCEDURE — 76770 US EXAM ABDO BACK WALL COMP: CPT | Mod: 26,,, | Performed by: RADIOLOGY

## 2020-06-13 LAB — BACTERIA UR CULT: ABNORMAL

## 2020-08-28 DIAGNOSIS — E11.9 TYPE 2 DIABETES MELLITUS WITHOUT COMPLICATION: ICD-10-CM

## 2020-09-17 DIAGNOSIS — Z12.39 BREAST CANCER SCREENING: ICD-10-CM

## 2020-09-23 PROBLEM — I50.9 CHF (CONGESTIVE HEART FAILURE): Status: ACTIVE | Noted: 2020-09-23

## 2020-09-23 PROBLEM — E03.9 HYPOTHYROIDISM: Status: ACTIVE | Noted: 2020-09-23

## 2020-09-23 PROBLEM — N17.9 AKI (ACUTE KIDNEY INJURY): Status: ACTIVE | Noted: 2020-09-23

## 2020-09-23 PROBLEM — I25.10 CORONARY ARTERY DISEASE: Status: ACTIVE | Noted: 2020-09-23

## 2020-09-23 PROBLEM — N18.9 ACUTE KIDNEY INJURY SUPERIMPOSED ON CKD: Status: ACTIVE | Noted: 2020-09-23

## 2020-09-23 PROBLEM — N17.9 AKI (ACUTE KIDNEY INJURY): Status: RESOLVED | Noted: 2020-09-23 | Resolved: 2020-09-23

## 2020-09-23 PROBLEM — N17.9 ACUTE KIDNEY INJURY: Status: ACTIVE | Noted: 2020-09-23

## 2020-09-24 PROBLEM — N39.0 UTI (URINARY TRACT INFECTION): Status: ACTIVE | Noted: 2020-09-24

## 2020-09-30 ENCOUNTER — PATIENT OUTREACH (OUTPATIENT)
Dept: ADMINISTRATIVE | Facility: HOSPITAL | Age: 55
End: 2020-09-30

## 2020-10-05 ENCOUNTER — PATIENT MESSAGE (OUTPATIENT)
Dept: ADMINISTRATIVE | Facility: HOSPITAL | Age: 55
End: 2020-10-05

## 2020-10-12 ENCOUNTER — PATIENT OUTREACH (OUTPATIENT)
Dept: ADMINISTRATIVE | Facility: HOSPITAL | Age: 55
End: 2020-10-12

## 2020-11-06 DIAGNOSIS — E11.9 TYPE 2 DIABETES MELLITUS WITHOUT COMPLICATION: ICD-10-CM

## 2020-11-25 ENCOUNTER — TELEPHONE (OUTPATIENT)
Dept: ADMINISTRATIVE | Facility: HOSPITAL | Age: 55
End: 2020-11-25

## 2020-12-02 PROBLEM — N04.9 NEPHROTIC SYNDROME: Status: ACTIVE | Noted: 2020-12-02

## 2020-12-02 PROBLEM — E87.70 VOLUME OVERLOAD: Status: ACTIVE | Noted: 2020-12-02

## 2020-12-02 PROBLEM — I50.33 ACUTE ON CHRONIC HEART FAILURE WITH PRESERVED EJECTION FRACTION: Status: ACTIVE | Noted: 2020-09-23

## 2020-12-03 PROBLEM — I21.4 NSTEMI (NON-ST ELEVATED MYOCARDIAL INFARCTION): Status: ACTIVE | Noted: 2020-12-03

## 2020-12-05 PROBLEM — I50.9 ACUTE DECOMPENSATED HEART FAILURE: Status: ACTIVE | Noted: 2020-12-05

## 2020-12-05 PROBLEM — E87.5 HYPERKALEMIA: Status: ACTIVE | Noted: 2020-12-05

## 2020-12-06 ENCOUNTER — PATIENT OUTREACH (OUTPATIENT)
Dept: ADMINISTRATIVE | Facility: OTHER | Age: 55
End: 2020-12-06

## 2020-12-06 PROBLEM — I50.9 ACUTE DECOMPENSATED HEART FAILURE: Status: RESOLVED | Noted: 2020-12-05 | Resolved: 2020-12-06

## 2020-12-07 ENCOUNTER — TELEPHONE (OUTPATIENT)
Dept: NEUROLOGY | Facility: CLINIC | Age: 55
End: 2020-12-07

## 2020-12-07 NOTE — TELEPHONE ENCOUNTER
----- Message from Dinorah Kee sent at 12/7/2020 10:21 AM CST -----  Contact: patient  Patient had an appointment scheduled for 12/7 but was admitted to the hospital and would like to reschedule       Ladonna at [phone]

## 2020-12-07 NOTE — PROGRESS NOTES
Care Everywhere:   Immunization: no profile in links   Health Maintenance: updated  Media Review: review for outside colon cancer and mammogram report  Legacy Review:   Order placed:   Upcoming appts:

## 2020-12-09 PROBLEM — N30.00 ACUTE CYSTITIS WITHOUT HEMATURIA: Status: ACTIVE | Noted: 2020-12-09

## 2021-01-04 ENCOUNTER — TELEPHONE (OUTPATIENT)
Dept: FAMILY MEDICINE | Facility: CLINIC | Age: 56
End: 2021-01-04

## 2021-01-11 ENCOUNTER — OFFICE VISIT (OUTPATIENT)
Dept: FAMILY MEDICINE | Facility: CLINIC | Age: 56
End: 2021-01-11
Payer: MEDICAID

## 2021-01-11 VITALS
BODY MASS INDEX: 35.69 KG/M2 | TEMPERATURE: 97 F | HEIGHT: 67 IN | DIASTOLIC BLOOD PRESSURE: 82 MMHG | HEART RATE: 72 BPM | SYSTOLIC BLOOD PRESSURE: 150 MMHG | RESPIRATION RATE: 16 BRPM | WEIGHT: 227.38 LBS

## 2021-01-11 DIAGNOSIS — N18.9 ACUTE KIDNEY INJURY SUPERIMPOSED ON CKD: ICD-10-CM

## 2021-01-11 DIAGNOSIS — G47.9 SLEEP DISTURBANCE: ICD-10-CM

## 2021-01-11 DIAGNOSIS — E03.9 HYPOTHYROIDISM, UNSPECIFIED TYPE: ICD-10-CM

## 2021-01-11 DIAGNOSIS — F32.A DEPRESSION, UNSPECIFIED DEPRESSION TYPE: ICD-10-CM

## 2021-01-11 DIAGNOSIS — I50.9 CONGESTIVE HEART FAILURE, UNSPECIFIED HF CHRONICITY, UNSPECIFIED HEART FAILURE TYPE: ICD-10-CM

## 2021-01-11 DIAGNOSIS — Z79.4 TYPE 2 DIABETES MELLITUS WITH OTHER SPECIFIED COMPLICATION, WITH LONG-TERM CURRENT USE OF INSULIN: ICD-10-CM

## 2021-01-11 DIAGNOSIS — E11.69 TYPE 2 DIABETES MELLITUS WITH OTHER SPECIFIED COMPLICATION, WITH LONG-TERM CURRENT USE OF INSULIN: ICD-10-CM

## 2021-01-11 DIAGNOSIS — I10 HYPERTENSION, UNSPECIFIED TYPE: Primary | ICD-10-CM

## 2021-01-11 DIAGNOSIS — R53.83 FATIGUE, UNSPECIFIED TYPE: ICD-10-CM

## 2021-01-11 DIAGNOSIS — I25.10 CORONARY ARTERY DISEASE, ANGINA PRESENCE UNSPECIFIED, UNSPECIFIED VESSEL OR LESION TYPE, UNSPECIFIED WHETHER NATIVE OR TRANSPLANTED HEART: ICD-10-CM

## 2021-01-11 DIAGNOSIS — M47.816 SPONDYLOSIS OF LUMBAR SPINE: ICD-10-CM

## 2021-01-11 DIAGNOSIS — R40.0 DAYTIME SOMNOLENCE: ICD-10-CM

## 2021-01-11 DIAGNOSIS — E78.2 MIXED HYPERLIPIDEMIA: ICD-10-CM

## 2021-01-11 DIAGNOSIS — N17.9 ACUTE KIDNEY INJURY SUPERIMPOSED ON CKD: ICD-10-CM

## 2021-01-11 DIAGNOSIS — M47.812 SPONDYLOSIS OF CERVICAL SPINE: ICD-10-CM

## 2021-01-11 PROCEDURE — 99213 OFFICE O/P EST LOW 20 MIN: CPT | Mod: PBBFAC | Performed by: NURSE PRACTITIONER

## 2021-01-11 PROCEDURE — 99214 OFFICE O/P EST MOD 30 MIN: CPT | Mod: S$PBB,,, | Performed by: NURSE PRACTITIONER

## 2021-01-11 PROCEDURE — 99999 PR PBB SHADOW E&M-EST. PATIENT-LVL III: CPT | Mod: PBBFAC,,, | Performed by: NURSE PRACTITIONER

## 2021-01-11 PROCEDURE — 99214 PR OFFICE/OUTPT VISIT, EST, LEVL IV, 30-39 MIN: ICD-10-PCS | Mod: S$PBB,,, | Performed by: NURSE PRACTITIONER

## 2021-01-11 PROCEDURE — 99999 PR PBB SHADOW E&M-EST. PATIENT-LVL III: ICD-10-PCS | Mod: PBBFAC,,, | Performed by: NURSE PRACTITIONER

## 2021-01-11 RX ORDER — ESCITALOPRAM OXALATE 10 MG/1
10 TABLET ORAL DAILY
Qty: 90 TABLET | Refills: 3 | Status: SHIPPED | OUTPATIENT
Start: 2021-01-11 | End: 2022-01-11

## 2021-01-11 RX ORDER — LOSARTAN POTASSIUM 100 MG/1
100 TABLET ORAL DAILY
Qty: 90 TABLET | Refills: 3 | Status: ON HOLD | OUTPATIENT
Start: 2021-01-11 | End: 2021-04-28 | Stop reason: HOSPADM

## 2021-01-13 ENCOUNTER — TELEPHONE (OUTPATIENT)
Dept: FAMILY MEDICINE | Facility: CLINIC | Age: 56
End: 2021-01-13

## 2021-01-19 ENCOUNTER — TELEPHONE (OUTPATIENT)
Dept: FAMILY MEDICINE | Facility: CLINIC | Age: 56
End: 2021-01-19

## 2021-01-25 ENCOUNTER — TELEPHONE (OUTPATIENT)
Dept: FAMILY MEDICINE | Facility: CLINIC | Age: 56
End: 2021-01-25

## 2021-02-24 ENCOUNTER — PATIENT MESSAGE (OUTPATIENT)
Dept: ADMINISTRATIVE | Facility: HOSPITAL | Age: 56
End: 2021-02-24

## 2021-02-24 ENCOUNTER — TELEPHONE (OUTPATIENT)
Dept: ADMINISTRATIVE | Facility: HOSPITAL | Age: 56
End: 2021-02-24

## 2021-04-05 ENCOUNTER — PATIENT MESSAGE (OUTPATIENT)
Dept: ADMINISTRATIVE | Facility: HOSPITAL | Age: 56
End: 2021-04-05

## 2021-04-13 ENCOUNTER — TELEPHONE (OUTPATIENT)
Dept: FAMILY MEDICINE | Facility: CLINIC | Age: 56
End: 2021-04-13

## 2021-04-13 ENCOUNTER — PATIENT MESSAGE (OUTPATIENT)
Dept: FAMILY MEDICINE | Facility: CLINIC | Age: 56
End: 2021-04-13

## 2021-04-13 DIAGNOSIS — L14 BULLOSIS DIABETICORUM IN TYPE 2 DIABETES MELLITUS: ICD-10-CM

## 2021-04-13 DIAGNOSIS — L97.901 VENOUS ULCER, LIMITED TO BREAKDOWN OF SKIN, UNSPECIFIED SITE, UNSPECIFIED WHETHER VARICOSE VEINS PRESENT: Primary | ICD-10-CM

## 2021-04-13 DIAGNOSIS — I83.009 VENOUS ULCER, LIMITED TO BREAKDOWN OF SKIN, UNSPECIFIED SITE, UNSPECIFIED WHETHER VARICOSE VEINS PRESENT: Primary | ICD-10-CM

## 2021-04-13 DIAGNOSIS — E11.628 BULLOSIS DIABETICORUM IN TYPE 2 DIABETES MELLITUS: ICD-10-CM

## 2021-04-15 ENCOUNTER — TELEPHONE (OUTPATIENT)
Dept: FAMILY MEDICINE | Facility: CLINIC | Age: 56
End: 2021-04-15

## 2021-04-21 PROBLEM — L30.4 INTERTRIGO: Status: ACTIVE | Noted: 2021-04-21

## 2021-04-21 PROBLEM — I50.9 ACUTE ON CHRONIC CONGESTIVE HEART FAILURE: Status: ACTIVE | Noted: 2021-04-21

## 2021-04-21 PROBLEM — I50.9 ACUTE ON CHRONIC HEART FAILURE: Status: ACTIVE | Noted: 2020-12-02

## 2021-04-21 PROBLEM — L97.909 CHRONIC SKIN ULCER OF LOWER LEG: Status: ACTIVE | Noted: 2021-04-21

## 2021-04-22 PROBLEM — I50.31 ACUTE HEART FAILURE WITH PRESERVED EJECTION FRACTION (HFPEF): Status: ACTIVE | Noted: 2021-04-21

## 2021-04-26 PROBLEM — I50.30 (HFPEF) HEART FAILURE WITH PRESERVED EJECTION FRACTION: Status: ACTIVE | Noted: 2021-04-21

## 2021-05-04 ENCOUNTER — PATIENT MESSAGE (OUTPATIENT)
Dept: RESEARCH | Facility: HOSPITAL | Age: 56
End: 2021-05-04

## 2021-07-07 ENCOUNTER — PATIENT MESSAGE (OUTPATIENT)
Dept: ADMINISTRATIVE | Facility: HOSPITAL | Age: 56
End: 2021-07-07

## 2021-08-04 ENCOUNTER — PATIENT MESSAGE (OUTPATIENT)
Dept: ADMINISTRATIVE | Facility: HOSPITAL | Age: 56
End: 2021-08-04

## 2021-08-24 ENCOUNTER — PATIENT OUTREACH (OUTPATIENT)
Dept: ADMINISTRATIVE | Facility: HOSPITAL | Age: 56
End: 2021-08-24